# Patient Record
Sex: FEMALE | Race: WHITE | NOT HISPANIC OR LATINO | Employment: OTHER | URBAN - METROPOLITAN AREA
[De-identification: names, ages, dates, MRNs, and addresses within clinical notes are randomized per-mention and may not be internally consistent; named-entity substitution may affect disease eponyms.]

---

## 2023-04-26 RX ORDER — HYDROCHLOROTHIAZIDE 25 MG/1
TABLET ORAL
Qty: 90 TABLET | Refills: 3 | OUTPATIENT
Start: 2023-04-26

## 2023-05-19 DIAGNOSIS — I10 PRIMARY HYPERTENSION: Primary | ICD-10-CM

## 2023-05-19 RX ORDER — METOPROLOL TARTRATE 50 MG/1
TABLET ORAL
Qty: 180 TABLET | Refills: 3 | Status: SHIPPED | OUTPATIENT
Start: 2023-05-19 | End: 2023-09-01 | Stop reason: SDUPTHER

## 2023-06-12 DIAGNOSIS — L30.9 DERMATITIS: Primary | ICD-10-CM

## 2023-06-12 RX ORDER — POTASSIUM CHLORIDE 1500 MG/1
TABLET, EXTENDED RELEASE ORAL
Qty: 90 TABLET | Refills: 3 | Status: SHIPPED | OUTPATIENT
Start: 2023-06-12 | End: 2023-09-01 | Stop reason: SDUPTHER

## 2023-08-17 DIAGNOSIS — E03.9 HYPOTHYROIDISM, UNSPECIFIED TYPE: Primary | ICD-10-CM

## 2023-08-17 RX ORDER — LEVOTHYROXINE SODIUM 125 UG/1
125 TABLET ORAL DAILY
Qty: 90 TABLET | Refills: 3 | Status: SHIPPED | OUTPATIENT
Start: 2023-08-17 | End: 2023-09-01 | Stop reason: SDUPTHER

## 2023-08-28 LAB
ALANINE AMINOTRANSFERASE (SGPT) (U/L) IN SER/PLAS: 18 U/L (ref 7–45)
ALBUMIN (G/DL) IN SER/PLAS: 4.5 G/DL (ref 3.4–5)
ALKALINE PHOSPHATASE (U/L) IN SER/PLAS: 79 U/L (ref 33–136)
ANION GAP IN SER/PLAS: 12 MMOL/L (ref 10–20)
APPEARANCE, URINE: ABNORMAL
ASPARTATE AMINOTRANSFERASE (SGOT) (U/L) IN SER/PLAS: 17 U/L (ref 9–39)
BASOPHILS (10*3/UL) IN BLOOD BY AUTOMATED COUNT: 0.05 X10E9/L (ref 0–0.1)
BASOPHILS/100 LEUKOCYTES IN BLOOD BY AUTOMATED COUNT: 0.7 % (ref 0–2)
BILIRUBIN TOTAL (MG/DL) IN SER/PLAS: 0.6 MG/DL (ref 0–1.2)
BILIRUBIN, URINE: NEGATIVE
BLOOD, URINE: NEGATIVE
CALCIUM (MG/DL) IN SER/PLAS: 9.8 MG/DL (ref 8.6–10.6)
CARBON DIOXIDE, TOTAL (MMOL/L) IN SER/PLAS: 30 MMOL/L (ref 21–32)
CHLORIDE (MMOL/L) IN SER/PLAS: 102 MMOL/L (ref 98–107)
COLOR, URINE: YELLOW
CREATININE (MG/DL) IN SER/PLAS: 0.63 MG/DL (ref 0.5–1.05)
EOSINOPHILS (10*3/UL) IN BLOOD BY AUTOMATED COUNT: 0.09 X10E9/L (ref 0–0.7)
EOSINOPHILS/100 LEUKOCYTES IN BLOOD BY AUTOMATED COUNT: 1.3 % (ref 0–6)
ERYTHROCYTE DISTRIBUTION WIDTH (RATIO) BY AUTOMATED COUNT: 12.4 % (ref 11.5–14.5)
ERYTHROCYTE MEAN CORPUSCULAR HEMOGLOBIN CONCENTRATION (G/DL) BY AUTOMATED: 32.7 G/DL (ref 32–36)
ERYTHROCYTE MEAN CORPUSCULAR VOLUME (FL) BY AUTOMATED COUNT: 91 FL (ref 80–100)
ERYTHROCYTES (10*6/UL) IN BLOOD BY AUTOMATED COUNT: 5.01 X10E12/L (ref 4–5.2)
GFR FEMALE: >90 ML/MIN/1.73M2
GLUCOSE (MG/DL) IN SER/PLAS: 136 MG/DL (ref 74–99)
GLUCOSE, URINE: NEGATIVE MG/DL
HEMATOCRIT (%) IN BLOOD BY AUTOMATED COUNT: 45.6 % (ref 36–46)
HEMOGLOBIN (G/DL) IN BLOOD: 14.9 G/DL (ref 12–16)
IMMATURE GRANULOCYTES/100 LEUKOCYTES IN BLOOD BY AUTOMATED COUNT: 0.1 % (ref 0–0.9)
KETONES, URINE: NEGATIVE MG/DL
LEUKOCYTE ESTERASE, URINE: ABNORMAL
LEUKOCYTES (10*3/UL) IN BLOOD BY AUTOMATED COUNT: 7 X10E9/L (ref 4.4–11.3)
LYMPHOCYTES (10*3/UL) IN BLOOD BY AUTOMATED COUNT: 2.24 X10E9/L (ref 1.2–4.8)
LYMPHOCYTES/100 LEUKOCYTES IN BLOOD BY AUTOMATED COUNT: 32.2 % (ref 13–44)
MONOCYTES (10*3/UL) IN BLOOD BY AUTOMATED COUNT: 0.8 X10E9/L (ref 0.1–1)
MONOCYTES/100 LEUKOCYTES IN BLOOD BY AUTOMATED COUNT: 11.5 % (ref 2–10)
MUCUS, URINE: NORMAL /LPF
NEUTROPHILS (10*3/UL) IN BLOOD BY AUTOMATED COUNT: 3.77 X10E9/L (ref 1.2–7.7)
NEUTROPHILS/100 LEUKOCYTES IN BLOOD BY AUTOMATED COUNT: 54.2 % (ref 40–80)
NITRITE, URINE: NEGATIVE
NRBC (PER 100 WBCS) BY AUTOMATED COUNT: 0 /100 WBC (ref 0–0)
PH, URINE: 5 (ref 5–8)
PLATELETS (10*3/UL) IN BLOOD AUTOMATED COUNT: 273 X10E9/L (ref 150–450)
POTASSIUM (MMOL/L) IN SER/PLAS: 4.4 MMOL/L (ref 3.5–5.3)
PROTEIN TOTAL: 7 G/DL (ref 6.4–8.2)
PROTEIN, URINE: NEGATIVE MG/DL
RBC, URINE: NORMAL /HPF (ref 0–5)
SODIUM (MMOL/L) IN SER/PLAS: 140 MMOL/L (ref 136–145)
SPECIFIC GRAVITY, URINE: 1.02 (ref 1–1.03)
SQUAMOUS EPITHELIAL CELLS, URINE: 2 /HPF
THYROTROPIN (MIU/L) IN SER/PLAS BY DETECTION LIMIT <= 0.05 MIU/L: 2.06 MIU/L (ref 0.44–3.98)
UREA NITROGEN (MG/DL) IN SER/PLAS: 26 MG/DL (ref 6–23)
URIC ACID (URATE) CRYSTALS, URINE: NORMAL /HPF
UROBILINOGEN, URINE: <2 MG/DL (ref 0–1.9)
WBC, URINE: 1 /HPF (ref 0–5)

## 2023-09-01 ENCOUNTER — OFFICE VISIT (OUTPATIENT)
Dept: PRIMARY CARE | Facility: CLINIC | Age: 63
End: 2023-09-01
Payer: COMMERCIAL

## 2023-09-01 ENCOUNTER — LAB (OUTPATIENT)
Dept: LAB | Facility: LAB | Age: 63
End: 2023-09-01
Payer: COMMERCIAL

## 2023-09-01 VITALS
WEIGHT: 238 LBS | BODY MASS INDEX: 38.25 KG/M2 | HEIGHT: 66 IN | DIASTOLIC BLOOD PRESSURE: 80 MMHG | SYSTOLIC BLOOD PRESSURE: 126 MMHG

## 2023-09-01 DIAGNOSIS — E03.9 HYPOTHYROIDISM, UNSPECIFIED TYPE: ICD-10-CM

## 2023-09-01 DIAGNOSIS — L30.9 DERMATITIS: ICD-10-CM

## 2023-09-01 DIAGNOSIS — Z79.899 MEDICATION MANAGEMENT: ICD-10-CM

## 2023-09-01 DIAGNOSIS — E13.69 OTHER SPECIFIED DIABETES MELLITUS WITH OTHER SPECIFIED COMPLICATION, UNSPECIFIED WHETHER LONG TERM INSULIN USE (MULTI): ICD-10-CM

## 2023-09-01 DIAGNOSIS — I10 HYPERTENSION, UNSPECIFIED TYPE: ICD-10-CM

## 2023-09-01 DIAGNOSIS — F41.9 ANXIETY: ICD-10-CM

## 2023-09-01 DIAGNOSIS — I10 PRIMARY HYPERTENSION: ICD-10-CM

## 2023-09-01 LAB
AMPHETAMINE (PRESENCE) IN URINE BY SCREEN METHOD: NORMAL
BARBITURATES PRESENCE IN URINE BY SCREEN METHOD: NORMAL
BENZODIAZEPINE (PRESENCE) IN URINE BY SCREEN METHOD: NORMAL
CANNABINOIDS IN URINE BY SCREEN METHOD: NORMAL
COCAINE (PRESENCE) IN URINE BY SCREEN METHOD: NORMAL
DRUG SCREEN COMMENT URINE: NORMAL
FENTANYL URINE: NORMAL
OPIATES (PRESENCE) IN URINE BY SCREEN METHOD: NORMAL
OXYCODONE (PRESENCE) IN URINE BY SCREEN METHOD: NORMAL
PHENCYCLIDINE (PRESENCE) IN URINE BY SCREEN METHOD: NORMAL

## 2023-09-01 PROCEDURE — 80307 DRUG TEST PRSMV CHEM ANLYZR: CPT

## 2023-09-01 PROCEDURE — 3044F HG A1C LEVEL LT 7.0%: CPT | Performed by: INTERNAL MEDICINE

## 2023-09-01 PROCEDURE — 3074F SYST BP LT 130 MM HG: CPT | Performed by: INTERNAL MEDICINE

## 2023-09-01 PROCEDURE — 3079F DIAST BP 80-89 MM HG: CPT | Performed by: INTERNAL MEDICINE

## 2023-09-01 PROCEDURE — 99213 OFFICE O/P EST LOW 20 MIN: CPT | Performed by: INTERNAL MEDICINE

## 2023-09-01 RX ORDER — HYDROCHLOROTHIAZIDE 25 MG/1
25 TABLET ORAL DAILY
Qty: 90 TABLET | Refills: 1 | Status: SHIPPED | OUTPATIENT
Start: 2023-09-01 | End: 2024-01-26 | Stop reason: SDUPTHER

## 2023-09-01 RX ORDER — BUPROPION HYDROCHLORIDE 300 MG/1
300 TABLET ORAL EVERY MORNING
Qty: 30 TABLET | Refills: 0 | Status: SHIPPED | OUTPATIENT
Start: 2023-09-01 | End: 2023-09-18 | Stop reason: SDUPTHER

## 2023-09-01 RX ORDER — ALPRAZOLAM 0.5 MG/1
0.5 TABLET ORAL DAILY PRN
Qty: 20 TABLET | Refills: 0 | Status: SHIPPED | OUTPATIENT
Start: 2023-09-01 | End: 2024-01-26 | Stop reason: SDUPTHER

## 2023-09-01 RX ORDER — LEVOTHYROXINE SODIUM 125 UG/1
125 TABLET ORAL DAILY
Qty: 90 TABLET | Refills: 1 | Status: SHIPPED | OUTPATIENT
Start: 2023-09-01 | End: 2024-01-26 | Stop reason: SDUPTHER

## 2023-09-01 RX ORDER — METOPROLOL TARTRATE 50 MG/1
TABLET ORAL
Qty: 180 TABLET | Refills: 1 | Status: SHIPPED | OUTPATIENT
Start: 2023-09-01 | End: 2024-02-13

## 2023-09-01 RX ORDER — POTASSIUM CHLORIDE 20 MEQ/1
TABLET, EXTENDED RELEASE ORAL
Qty: 90 TABLET | Refills: 1 | Status: SHIPPED | OUTPATIENT
Start: 2023-09-01 | End: 2024-01-26 | Stop reason: SDUPTHER

## 2023-09-01 ASSESSMENT — ENCOUNTER SYMPTOMS
DEPRESSION: 0
OCCASIONAL FEELINGS OF UNSTEADINESS: 0
LOSS OF SENSATION IN FEET: 0

## 2023-09-01 NOTE — PROGRESS NOTES
OFFICE NOTE    NAME OF THE PATIENT: Alexis Yap    YOB: 1960    CHIEF COMPLAINT:  Alexis Yap today came here for multiple medical issues.  She tried Rybelsus.  She has too many side effects.  She stopped it.  Blood sugars okay.  She is lost about eight to nine pounds by intermittent fasting.  She is trying to keep sugar under control.  She cannot take metformin.  She came here for follow-up on various conditions.    PAST MEDICAL HISTORY:  Reviewed on EMR, unchanged.    CURRENT MEDICATIONS:  Reviewed on EMR, unchanged.  Hydrochlorothiazide. Synthroid, metoprolol, and potassium.    ALLERGIES:  Reviewed on EMR, unchanged.    SOCIAL HISTORY:  Reviewed on EMR, unchanged.  She does not smoke and does not drink alcohol.    FAMILY HISTORY:  Reviewed on EMR, unchanged.    REVIEW OF SYSTEMS:  All 12 systems reviewed and pertaining covered in history and physical.    PHYSICAL EXAMINATION  VITAL SIGNS:  As recorded and reviewed from EMR.  RESPIRATORY:  The patient had normal inspirations and expirations.  The breath sounds were equal bilaterally and clear to auscultation.  CARDIOVASCULAR:  The patient had S1 normal, split S2 without obvious rubs, clicks, or murmurs.    GASTROINTESTINAL:  There was no hepatosplenomegaly.  There were no palpable masses and no inguinal nodes.  EXTREMITIES:  Legs had no edema.  NEUROLOGIC:  The patient had normal cranial nerves.  The reflexes, sensory, and motor examination were grossly within normal limits.    LAB WORK:  Laboratory testing discussed.    ASSESSMENT AND PLAN:  Type 2 diabetes, diet-controlled.  I will recheck it.  Anxiety and stress.  I urged her to take Wellbutrin.  Refer to counseling.  Her  has a psychiatric issue.  She needs Xanax or coping technique especially when she goes to flight, she has flight anxiety.  She needs Xanax.  20 tablets given last time in October. Her drug report is okay.  Hypertension, good.  Hypothyroid.  We will  "monitor.  Follow-up appointment in three months.  Do the blood work.      Kindly review this note in conjunction with EMR.   Subjective   Patient ID: Alexis Yap is a 63 y.o. female who presents for Follow-up.      HPI    Review of Systems    Objective   /80   Ht 1.676 m (5' 6\")   Wt 108 kg (238 lb)   BMI 38.41 kg/m²       Physical Exam    Assessment/Plan   Problem List Items Addressed This Visit    None        "

## 2023-09-18 DIAGNOSIS — F41.9 ANXIETY: ICD-10-CM

## 2023-09-18 RX ORDER — BUPROPION HYDROCHLORIDE 300 MG/1
300 TABLET ORAL EVERY MORNING
Qty: 30 TABLET | Refills: 1 | Status: SHIPPED | OUTPATIENT
Start: 2023-09-18 | End: 2023-10-12 | Stop reason: SDUPTHER

## 2023-10-06 DIAGNOSIS — M65.341 TRIGGER RING FINGER OF RIGHT HAND: ICD-10-CM

## 2023-10-09 PROBLEM — M65.341 TRIGGER RING FINGER OF RIGHT HAND: Status: ACTIVE | Noted: 2023-10-06

## 2023-10-11 PROBLEM — E55.9 VITAMIN D DEFICIENCY: Status: ACTIVE | Noted: 2023-10-11

## 2023-10-11 PROBLEM — N89.8 PRURITUS OF VAGINA: Status: ACTIVE | Noted: 2023-10-11

## 2023-10-11 PROBLEM — Z96.649 HISTORY OF TOTAL HIP REPLACEMENT: Status: ACTIVE | Noted: 2023-10-11

## 2023-10-11 PROBLEM — K64.8 INTERNAL HEMORRHOIDS: Status: ACTIVE | Noted: 2023-10-11

## 2023-10-11 PROBLEM — R14.0 ABDOMINAL BLOATING: Status: ACTIVE | Noted: 2023-10-11

## 2023-10-11 PROBLEM — L91.8 OTHER HYPERTROPHIC DISORDERS OF THE SKIN: Status: ACTIVE | Noted: 2023-06-01

## 2023-10-11 PROBLEM — N32.81 OAB (OVERACTIVE BLADDER): Status: ACTIVE | Noted: 2023-10-11

## 2023-10-11 PROBLEM — E66.01 MORBID OBESITY (MULTI): Status: ACTIVE | Noted: 2023-10-11

## 2023-10-11 PROBLEM — L57.9 SKIN CHANGES DUE TO CHRONIC EXPOSURE TO NONIONIZING RADIATION, UNSPECIFIED: Status: ACTIVE | Noted: 2023-06-01

## 2023-10-11 PROBLEM — M16.0 PRIMARY OSTEOARTHRITIS OF BOTH HIPS: Status: ACTIVE | Noted: 2023-10-11

## 2023-10-11 PROBLEM — R10.30 GROIN PAIN: Status: ACTIVE | Noted: 2023-10-11

## 2023-10-11 PROBLEM — M22.2X1 PATELLOFEMORAL SYNDROME, RIGHT: Status: ACTIVE | Noted: 2023-10-11

## 2023-10-11 PROBLEM — G47.00 INSOMNIA: Status: ACTIVE | Noted: 2023-10-11

## 2023-10-11 PROBLEM — M25.569 KNEE PAIN: Status: ACTIVE | Noted: 2023-10-11

## 2023-10-11 PROBLEM — D22.61 MELANOCYTIC NEVI OF RIGHT UPPER LIMB, INCLUDING SHOULDER: Status: ACTIVE | Noted: 2023-06-01

## 2023-10-11 PROBLEM — R91.8 LUNG NODULES: Status: ACTIVE | Noted: 2023-10-11

## 2023-10-11 PROBLEM — R03.0 BORDERLINE HYPERTENSION: Status: ACTIVE | Noted: 2023-10-11

## 2023-10-11 PROBLEM — M17.9 KNEE OSTEOARTHRITIS: Status: ACTIVE | Noted: 2023-10-11

## 2023-10-11 PROBLEM — N95.1 MENOPAUSAL SYMPTOMS: Status: ACTIVE | Noted: 2023-10-11

## 2023-10-11 PROBLEM — M25.559 HIP PAIN: Status: ACTIVE | Noted: 2023-10-11

## 2023-10-11 PROBLEM — S83.209A TEAR OF MENISCUS, CURRENT: Status: ACTIVE | Noted: 2023-10-11

## 2023-10-11 PROBLEM — H52.03 HYPEROPIA OF BOTH EYES: Status: ACTIVE | Noted: 2023-10-11

## 2023-10-11 PROBLEM — D22.62 MELANOCYTIC NEVI OF LEFT UPPER LIMB, INCLUDING SHOULDER: Status: ACTIVE | Noted: 2023-06-01

## 2023-10-11 PROBLEM — F41.9 ANXIETY: Status: ACTIVE | Noted: 2023-10-11

## 2023-10-11 PROBLEM — M17.0 ARTHRITIS OF BOTH KNEES: Status: ACTIVE | Noted: 2023-10-11

## 2023-10-11 PROBLEM — Z98.890 HISTORY OF ARTHROPLASTY OF LEFT HIP: Status: ACTIVE | Noted: 2023-10-11

## 2023-10-11 PROBLEM — H25.13 AGE-RELATED NUCLEAR CATARACT OF BOTH EYES: Status: ACTIVE | Noted: 2023-10-11

## 2023-10-11 PROBLEM — R12 HEARTBURN: Status: ACTIVE | Noted: 2023-10-11

## 2023-10-11 PROBLEM — L91.8 MULTIPLE ACQUIRED SKIN TAGS: Status: ACTIVE | Noted: 2023-10-11

## 2023-10-11 PROBLEM — M16.10 HIP ARTHRITIS: Status: ACTIVE | Noted: 2023-10-11

## 2023-10-11 PROBLEM — R39.9 LOWER URINARY TRACT SYMPTOMS (LUTS): Status: ACTIVE | Noted: 2023-10-11

## 2023-10-11 PROBLEM — E05.90 HYPERTHYROIDISM: Status: ACTIVE | Noted: 2023-10-11

## 2023-10-11 PROBLEM — R35.0 URINE FREQUENCY: Status: ACTIVE | Noted: 2023-10-11

## 2023-10-11 PROBLEM — K29.60 BILE REFLUX GASTRITIS: Status: ACTIVE | Noted: 2023-10-11

## 2023-10-11 PROBLEM — R53.1 FEELING WEAK: Status: ACTIVE | Noted: 2023-10-11

## 2023-10-11 PROBLEM — R63.4 WEIGHT LOSS: Status: ACTIVE | Noted: 2023-10-11

## 2023-10-11 PROBLEM — R73.03 PRE-DIABETES: Status: ACTIVE | Noted: 2023-10-11

## 2023-10-11 PROBLEM — K62.5 RECTAL BLEEDING: Status: ACTIVE | Noted: 2023-10-11

## 2023-10-11 PROBLEM — R94.31 ABNORMAL EKG: Status: ACTIVE | Noted: 2023-10-11

## 2023-10-11 PROBLEM — H52.4 BILATERAL PRESBYOPIA: Status: ACTIVE | Noted: 2023-10-11

## 2023-10-11 PROBLEM — D48.5 NEOPLASM OF UNCERTAIN BEHAVIOR OF SKIN: Status: ACTIVE | Noted: 2023-06-01

## 2023-10-11 PROBLEM — K21.9 ESOPHAGEAL REFLUX: Status: ACTIVE | Noted: 2023-10-11

## 2023-10-11 PROBLEM — R42 DIZZINESS: Status: ACTIVE | Noted: 2023-10-11

## 2023-10-11 PROBLEM — D22.5 MELANOCYTIC NEVI OF TRUNK: Status: ACTIVE | Noted: 2023-06-01

## 2023-10-11 PROBLEM — R10.13 ABDOMINAL PAIN, EPIGASTRIC: Status: ACTIVE | Noted: 2023-10-11

## 2023-10-11 PROBLEM — E53.9 VITAMIN B DEFICIENCY: Status: ACTIVE | Noted: 2023-10-11

## 2023-10-11 PROBLEM — E78.00 HYPERCHOLESTEREMIA: Status: ACTIVE | Noted: 2023-10-11

## 2023-10-11 PROBLEM — R07.89 ATYPICAL CHEST PAIN: Status: ACTIVE | Noted: 2023-10-11

## 2023-10-11 PROBLEM — M67.51 PLICA SYNDROME, RIGHT KNEE: Status: ACTIVE | Noted: 2023-10-11

## 2023-10-11 PROBLEM — R60.9 EDEMA: Status: ACTIVE | Noted: 2023-10-11

## 2023-10-11 PROBLEM — K58.0 IRRITABLE BOWEL SYNDROME WITH DIARRHEA: Status: ACTIVE | Noted: 2023-10-11

## 2023-10-11 PROBLEM — A04.8 HELICOBACTER PYLORI (H. PYLORI) INFECTION: Status: ACTIVE | Noted: 2023-10-11

## 2023-10-11 RX ORDER — SOLIFENACIN SUCCINATE 10 MG/1
10 TABLET, FILM COATED ORAL DAILY
COMMUNITY
End: 2023-11-22 | Stop reason: ALTCHOICE

## 2023-10-11 RX ORDER — FLUCONAZOLE 150 MG/1
TABLET ORAL
COMMUNITY
Start: 2023-01-03 | End: 2023-11-22 | Stop reason: ALTCHOICE

## 2023-10-11 RX ORDER — METHOCARBAMOL 750 MG/1
750 TABLET, FILM COATED ORAL 3 TIMES DAILY
COMMUNITY
End: 2023-11-22 | Stop reason: ALTCHOICE

## 2023-10-11 RX ORDER — METFORMIN HYDROCHLORIDE 500 MG/1
1 TABLET ORAL 2 TIMES DAILY
COMMUNITY
Start: 2019-08-29 | End: 2023-11-22 | Stop reason: ALTCHOICE

## 2023-10-11 RX ORDER — ASPIRIN 81 MG/1
1 TABLET ORAL DAILY
COMMUNITY
Start: 2017-03-27 | End: 2024-02-28 | Stop reason: HOSPADM

## 2023-10-11 RX ORDER — ASPIRIN 325 MG
1 TABLET ORAL DAILY
COMMUNITY
Start: 2020-09-24 | End: 2024-02-15 | Stop reason: ALTCHOICE

## 2023-10-11 RX ORDER — VITAMIN B COMPLEX
1 CAPSULE ORAL DAILY
COMMUNITY
End: 2023-11-22 | Stop reason: ALTCHOICE

## 2023-10-11 RX ORDER — FLUTICASONE PROPIONATE 50 MCG
SPRAY, SUSPENSION (ML) NASAL
COMMUNITY
Start: 2022-12-06 | End: 2023-11-22 | Stop reason: ALTCHOICE

## 2023-10-11 RX ORDER — FUROSEMIDE 40 MG/1
1 TABLET ORAL DAILY
COMMUNITY
Start: 2023-03-03 | End: 2023-11-22 | Stop reason: ALTCHOICE

## 2023-10-11 RX ORDER — TRAMADOL HYDROCHLORIDE 50 MG/1
50 TABLET ORAL EVERY 6 HOURS PRN
COMMUNITY
Start: 2020-08-18 | End: 2023-10-20 | Stop reason: HOSPADM

## 2023-10-11 RX ORDER — ACETAMINOPHEN 500 MG
1000 TABLET ORAL 3 TIMES DAILY
COMMUNITY
End: 2024-02-15 | Stop reason: ALTCHOICE

## 2023-10-11 RX ORDER — ROSUVASTATIN CALCIUM 10 MG/1
1 TABLET, COATED ORAL EVERY OTHER DAY
COMMUNITY
Start: 2017-03-10 | End: 2024-04-12

## 2023-10-11 RX ORDER — IBUPROFEN 800 MG/1
800 TABLET ORAL 3 TIMES DAILY PRN
COMMUNITY
Start: 2022-10-28 | End: 2024-02-28 | Stop reason: HOSPADM

## 2023-10-11 RX ORDER — ACETAMINOPHEN 325 MG/1
2 TABLET ORAL EVERY 8 HOURS PRN
COMMUNITY
Start: 2020-08-18 | End: 2024-02-28 | Stop reason: HOSPADM

## 2023-10-11 RX ORDER — PREDNISONE 50 MG/1
TABLET ORAL
COMMUNITY
Start: 2022-12-06 | End: 2023-11-22 | Stop reason: ALTCHOICE

## 2023-10-11 RX ORDER — ALBUTEROL SULFATE 90 UG/1
AEROSOL, METERED RESPIRATORY (INHALATION)
COMMUNITY
Start: 2022-12-06 | End: 2023-10-12 | Stop reason: SDUPTHER

## 2023-10-11 RX ORDER — ORAL SEMAGLUTIDE 7 MG/1
1 TABLET ORAL DAILY
COMMUNITY
Start: 2023-02-03 | End: 2023-11-22 | Stop reason: ALTCHOICE

## 2023-10-11 RX ORDER — AMOXICILLIN 500 MG/1
2000 CAPSULE ORAL
COMMUNITY
Start: 2022-08-05 | End: 2023-11-22 | Stop reason: ALTCHOICE

## 2023-10-11 RX ORDER — MIRABEGRON 50 MG/1
1 TABLET, FILM COATED, EXTENDED RELEASE ORAL DAILY
COMMUNITY
Start: 2022-05-14 | End: 2023-11-22 | Stop reason: ALTCHOICE

## 2023-10-11 RX ORDER — CHOLECALCIFEROL (VITAMIN D3) 125 MCG
125 CAPSULE ORAL DAILY
COMMUNITY

## 2023-10-12 ENCOUNTER — OFFICE VISIT (OUTPATIENT)
Dept: PRIMARY CARE | Facility: CLINIC | Age: 63
End: 2023-10-12
Payer: COMMERCIAL

## 2023-10-12 VITALS
SYSTOLIC BLOOD PRESSURE: 140 MMHG | HEIGHT: 66 IN | BODY MASS INDEX: 39.21 KG/M2 | DIASTOLIC BLOOD PRESSURE: 82 MMHG | WEIGHT: 244 LBS

## 2023-10-12 DIAGNOSIS — F41.9 ANXIETY: ICD-10-CM

## 2023-10-12 DIAGNOSIS — J45.909 ACUTE ASTHMATIC BRONCHITIS (HHS-HCC): Primary | ICD-10-CM

## 2023-10-12 DIAGNOSIS — R05.9 COUGH, UNSPECIFIED TYPE: ICD-10-CM

## 2023-10-12 DIAGNOSIS — E78.00 HYPERCHOLESTEREMIA: ICD-10-CM

## 2023-10-12 DIAGNOSIS — E03.9 HYPOTHYROIDISM, UNSPECIFIED TYPE: ICD-10-CM

## 2023-10-12 DIAGNOSIS — R06.2 WHEEZING: ICD-10-CM

## 2023-10-12 DIAGNOSIS — F43.9 STRESS: ICD-10-CM

## 2023-10-12 DIAGNOSIS — E13.69 OTHER SPECIFIED DIABETES MELLITUS WITH OTHER SPECIFIED COMPLICATION, UNSPECIFIED WHETHER LONG TERM INSULIN USE (MULTI): ICD-10-CM

## 2023-10-12 DIAGNOSIS — I10 HYPERTENSION, UNSPECIFIED TYPE: ICD-10-CM

## 2023-10-12 PROCEDURE — 99214 OFFICE O/P EST MOD 30 MIN: CPT | Performed by: INTERNAL MEDICINE

## 2023-10-12 PROCEDURE — 3079F DIAST BP 80-89 MM HG: CPT | Performed by: INTERNAL MEDICINE

## 2023-10-12 PROCEDURE — 1036F TOBACCO NON-USER: CPT | Performed by: INTERNAL MEDICINE

## 2023-10-12 PROCEDURE — 3044F HG A1C LEVEL LT 7.0%: CPT | Performed by: INTERNAL MEDICINE

## 2023-10-12 PROCEDURE — 3077F SYST BP >= 140 MM HG: CPT | Performed by: INTERNAL MEDICINE

## 2023-10-12 RX ORDER — BUDESONIDE AND FORMOTEROL FUMARATE DIHYDRATE 80; 4.5 UG/1; UG/1
2 AEROSOL RESPIRATORY (INHALATION)
Qty: 6.9 G | Refills: 1 | Status: SHIPPED | OUTPATIENT
Start: 2023-10-12 | End: 2023-11-22 | Stop reason: ALTCHOICE

## 2023-10-12 RX ORDER — BUPROPION HYDROCHLORIDE 300 MG/1
300 TABLET ORAL EVERY MORNING
Qty: 30 TABLET | Refills: 1 | Status: SHIPPED | OUTPATIENT
Start: 2023-10-12 | End: 2024-01-03 | Stop reason: SDUPTHER

## 2023-10-12 RX ORDER — DEXTROMETHORPHAN HYDROBROMIDE, GUAIFENESIN 20; 400 MG/20ML; MG/20ML
5 SOLUTION ORAL 2 TIMES DAILY
Qty: 118 ML | Refills: 0 | Status: SHIPPED | OUTPATIENT
Start: 2023-10-12 | End: 2023-11-22 | Stop reason: ALTCHOICE

## 2023-10-12 RX ORDER — ALBUTEROL SULFATE 90 UG/1
2 AEROSOL, METERED RESPIRATORY (INHALATION)
Qty: 18 G | Refills: 1 | Status: SHIPPED | OUTPATIENT
Start: 2023-10-12 | End: 2023-11-22 | Stop reason: ALTCHOICE

## 2023-10-12 RX ORDER — LEVOFLOXACIN 250 MG/1
250 TABLET ORAL DAILY
Qty: 10 TABLET | Refills: 0 | Status: SHIPPED | OUTPATIENT
Start: 2023-10-12 | End: 2023-10-22

## 2023-10-12 ASSESSMENT — ENCOUNTER SYMPTOMS
OCCASIONAL FEELINGS OF UNSTEADINESS: 0
DEPRESSION: 0
LOSS OF SENSATION IN FEET: 0

## 2023-10-13 ENCOUNTER — APPOINTMENT (OUTPATIENT)
Dept: DERMATOLOGY | Facility: CLINIC | Age: 63
End: 2023-10-13
Payer: COMMERCIAL

## 2023-10-13 NOTE — PROGRESS NOTES
"Subjective   Patient ID: Alexis Yap is a 63 y.o. female who presents for Follow-up (Multiple medical issues).    This young lady today come here for cough, yellow sputum, sinus congestion, bronchitis, headache going on for the last several days.  Over-the-counter medications not helping.    She like the way she feels on Wellbutrin, things are better.  She wants to continue.  She wants to keep same dose.    I have personally reviewed the patient's Past Medical History, Medications, Allergies, Social History, and Family History in the EMR.    Review of Systems   All other systems reviewed and are negative.    Objective   /82   Ht 1.676 m (5' 6\")   Wt 111 kg (244 lb)   BMI 39.38 kg/m²     Physical Exam  Vitals reviewed.   HENT:      Nose: Congestion present.      Comments: Postnasal drip.     Mouth/Throat:      Comments: Throat congested.  Cardiovascular:      Heart sounds: Normal heart sounds, S1 normal and S2 normal. No murmur heard.     No friction rub.   Pulmonary:      Effort: Pulmonary effort is normal.      Breath sounds: Wheezing present.   Abdominal:      Palpations: There is no hepatomegaly, splenomegaly or mass.   Musculoskeletal:      Right lower leg: No edema.      Left lower leg: No edema.   Lymphadenopathy:      Lower Body: No right inguinal adenopathy. No left inguinal adenopathy.   Neurological:      Cranial Nerves: Cranial nerves 2-12 are intact.      Sensory: No sensory deficit.      Motor: Motor function is intact.      Deep Tendon Reflexes: Reflexes are normal and symmetric.     LAB WORK: Laboratory testing discussed.    Assessment/Plan   Problem List Items Addressed This Visit             ICD-10-CM       Cardiac and Vasculature    Hypercholesteremia E78.00    Relevant Orders    Comprehensive metabolic panel    Lipid panel       Mental Health    Anxiety F41.9    Relevant Medications    buPROPion XL (Wellbutrin XL) 300 mg 24 hr tablet     Other Visit Diagnoses         Codes    Acute " asthmatic bronchitis    -  Primary J45.909    Hypertension, unspecified type     I10    Relevant Orders    CBC    Other specified diabetes mellitus with other specified complication, unspecified whether long term insulin use (CMS/[facility])     E13.69    Relevant Orders    Hemoglobin A1c    Hypothyroidism, unspecified type     E03.9    Relevant Orders    TSH    Wheezing     R06.2    Relevant Medications    levoFLOXacin (Levaquin) 250 mg tablet    budesonide-formoteroL (Symbicort) 80-4.5 mcg/actuation inhaler    albuterol 90 mcg/actuation inhaler    Cough, unspecified type     R05.9    Relevant Medications    levoFLOXacin (Levaquin) 250 mg tablet    budesonide-formoteroL (Symbicort) 80-4.5 mcg/actuation inhaler    albuterol 90 mcg/actuation inhaler    dextromethorphan-guaifenesin (Robitussin Cough-Chest Richard DM) 5-100 mg/5 mL liquid    Stress     F43.9        1. Acute asthmatic bronchitis.  Levaquin, Claritin, Robitussin, Flonase, Symbicort, albuterol.  Monitor.  2. Anxiety, stress.  Continue Wellbutrin same dose.  3. Follow-up in three months.  Happy to see anytime sooner if necessary.    Scribe Attestation  By signing my name below, IRosie Scribe attest that this documentation has been prepared under the direction and in the presence of Elier Kruger MD.

## 2023-10-17 RX ORDER — GLUCOSAMINE/CHONDRO SU A 500-400 MG
2 TABLET ORAL DAILY
COMMUNITY
End: 2024-02-28 | Stop reason: HOSPADM

## 2023-10-17 RX ORDER — CALCIUM CARBONATE 300MG(750)
400 TABLET,CHEWABLE ORAL DAILY
COMMUNITY

## 2023-10-19 ENCOUNTER — ANESTHESIA EVENT (OUTPATIENT)
Dept: OPERATING ROOM | Facility: CLINIC | Age: 63
End: 2023-10-19
Payer: COMMERCIAL

## 2023-10-20 ENCOUNTER — ANESTHESIA (OUTPATIENT)
Dept: OPERATING ROOM | Facility: CLINIC | Age: 63
End: 2023-10-20
Payer: COMMERCIAL

## 2023-10-20 ENCOUNTER — HOSPITAL ENCOUNTER (OUTPATIENT)
Facility: CLINIC | Age: 63
Setting detail: OUTPATIENT SURGERY
Discharge: HOME | End: 2023-10-20
Attending: ORTHOPAEDIC SURGERY | Admitting: ORTHOPAEDIC SURGERY
Payer: COMMERCIAL

## 2023-10-20 VITALS
BODY MASS INDEX: 38.69 KG/M2 | SYSTOLIC BLOOD PRESSURE: 108 MMHG | HEIGHT: 66 IN | WEIGHT: 240.74 LBS | RESPIRATION RATE: 16 BRPM | TEMPERATURE: 97.2 F | DIASTOLIC BLOOD PRESSURE: 70 MMHG | HEART RATE: 69 BPM | OXYGEN SATURATION: 98 %

## 2023-10-20 DIAGNOSIS — M65.341 TRIGGER RING FINGER OF RIGHT HAND: Primary | ICD-10-CM

## 2023-10-20 PROBLEM — E05.90 HYPERTHYROIDISM: Status: RESOLVED | Noted: 2023-10-11 | Resolved: 2023-10-20

## 2023-10-20 PROBLEM — R07.89 ATYPICAL CHEST PAIN: Status: RESOLVED | Noted: 2023-10-11 | Resolved: 2023-10-20

## 2023-10-20 PROBLEM — I10 HTN (HYPERTENSION): Status: ACTIVE | Noted: 2023-10-20

## 2023-10-20 PROBLEM — E03.9 HYPOTHYROIDISM: Status: ACTIVE | Noted: 2023-10-20

## 2023-10-20 PROCEDURE — 2500000004 HC RX 250 GENERAL PHARMACY W/ HCPCS (ALT 636 FOR OP/ED): Performed by: NURSE ANESTHETIST, CERTIFIED REGISTERED

## 2023-10-20 PROCEDURE — 3600000003 HC OR TIME - INITIAL BASE CHARGE - PROCEDURE LEVEL THREE: Performed by: ORTHOPAEDIC SURGERY

## 2023-10-20 PROCEDURE — 3700000002 HC GENERAL ANESTHESIA TIME - EACH INCREMENTAL 1 MINUTE: Performed by: ORTHOPAEDIC SURGERY

## 2023-10-20 PROCEDURE — 7100000010 HC PHASE TWO TIME - EACH INCREMENTAL 1 MINUTE: Performed by: ORTHOPAEDIC SURGERY

## 2023-10-20 PROCEDURE — A26055 PR INCISE FINGER TENDON SHEATH: Performed by: NURSE ANESTHETIST, CERTIFIED REGISTERED

## 2023-10-20 PROCEDURE — A26055 PR INCISE FINGER TENDON SHEATH: Performed by: ANESTHESIOLOGY

## 2023-10-20 PROCEDURE — 2500000005 HC RX 250 GENERAL PHARMACY W/O HCPCS: Performed by: ORTHOPAEDIC SURGERY

## 2023-10-20 PROCEDURE — 2500000004 HC RX 250 GENERAL PHARMACY W/ HCPCS (ALT 636 FOR OP/ED): Performed by: ANESTHESIOLOGY

## 2023-10-20 PROCEDURE — 3600000008 HC OR TIME - EACH INCREMENTAL 1 MINUTE - PROCEDURE LEVEL THREE: Performed by: ORTHOPAEDIC SURGERY

## 2023-10-20 PROCEDURE — 26055 INCISE FINGER TENDON SHEATH: CPT | Performed by: ORTHOPAEDIC SURGERY

## 2023-10-20 PROCEDURE — 7100000009 HC PHASE TWO TIME - INITIAL BASE CHARGE: Performed by: ORTHOPAEDIC SURGERY

## 2023-10-20 PROCEDURE — 3700000001 HC GENERAL ANESTHESIA TIME - INITIAL BASE CHARGE: Performed by: ORTHOPAEDIC SURGERY

## 2023-10-20 PROCEDURE — 2500000005 HC RX 250 GENERAL PHARMACY W/O HCPCS: Performed by: NURSE ANESTHETIST, CERTIFIED REGISTERED

## 2023-10-20 PROCEDURE — 2500000004 HC RX 250 GENERAL PHARMACY W/ HCPCS (ALT 636 FOR OP/ED): Performed by: ORTHOPAEDIC SURGERY

## 2023-10-20 RX ORDER — LIDOCAINE HYDROCHLORIDE 10 MG/ML
INJECTION INFILTRATION; PERINEURAL AS NEEDED
Status: DISCONTINUED | OUTPATIENT
Start: 2023-10-20 | End: 2023-10-20 | Stop reason: HOSPADM

## 2023-10-20 RX ORDER — SODIUM CHLORIDE, SODIUM LACTATE, POTASSIUM CHLORIDE, CALCIUM CHLORIDE 600; 310; 30; 20 MG/100ML; MG/100ML; MG/100ML; MG/100ML
100 INJECTION, SOLUTION INTRAVENOUS CONTINUOUS
Status: DISCONTINUED | OUTPATIENT
Start: 2023-10-20 | End: 2023-10-20 | Stop reason: HOSPADM

## 2023-10-20 RX ORDER — FENTANYL CITRATE 50 UG/ML
INJECTION, SOLUTION INTRAMUSCULAR; INTRAVENOUS AS NEEDED
Status: DISCONTINUED | OUTPATIENT
Start: 2023-10-20 | End: 2023-10-20

## 2023-10-20 RX ORDER — ONDANSETRON HYDROCHLORIDE 2 MG/ML
4 INJECTION, SOLUTION INTRAVENOUS ONCE AS NEEDED
Status: DISCONTINUED | OUTPATIENT
Start: 2023-10-20 | End: 2023-10-20 | Stop reason: HOSPADM

## 2023-10-20 RX ORDER — PROPOFOL 10 MG/ML
INJECTION, EMULSION INTRAVENOUS AS NEEDED
Status: DISCONTINUED | OUTPATIENT
Start: 2023-10-20 | End: 2023-10-20

## 2023-10-20 RX ORDER — PROPOFOL 10 MG/ML
INJECTION, EMULSION INTRAVENOUS CONTINUOUS PRN
Status: DISCONTINUED | OUTPATIENT
Start: 2023-10-20 | End: 2023-10-20

## 2023-10-20 RX ORDER — HYDROMORPHONE HYDROCHLORIDE 1 MG/ML
0.5 INJECTION, SOLUTION INTRAMUSCULAR; INTRAVENOUS; SUBCUTANEOUS EVERY 5 MIN PRN
Status: DISCONTINUED | OUTPATIENT
Start: 2023-10-20 | End: 2023-10-20 | Stop reason: HOSPADM

## 2023-10-20 RX ORDER — CEFAZOLIN 1 G/1
INJECTION, POWDER, FOR SOLUTION INTRAVENOUS AS NEEDED
Status: DISCONTINUED | OUTPATIENT
Start: 2023-10-20 | End: 2023-10-20

## 2023-10-20 RX ORDER — MIDAZOLAM HYDROCHLORIDE 1 MG/ML
INJECTION, SOLUTION INTRAMUSCULAR; INTRAVENOUS AS NEEDED
Status: DISCONTINUED | OUTPATIENT
Start: 2023-10-20 | End: 2023-10-20

## 2023-10-20 RX ORDER — OXYCODONE HYDROCHLORIDE 5 MG/1
5 TABLET ORAL EVERY 4 HOURS PRN
Status: DISCONTINUED | OUTPATIENT
Start: 2023-10-20 | End: 2023-10-20 | Stop reason: HOSPADM

## 2023-10-20 RX ORDER — BUPIVACAINE HYDROCHLORIDE 5 MG/ML
INJECTION, SOLUTION EPIDURAL; INTRACAUDAL AS NEEDED
Status: DISCONTINUED | OUTPATIENT
Start: 2023-10-20 | End: 2023-10-20 | Stop reason: HOSPADM

## 2023-10-20 RX ORDER — CEFAZOLIN SODIUM 2 G/100ML
2 INJECTION, SOLUTION INTRAVENOUS EVERY 8 HOURS
Status: DISCONTINUED | OUTPATIENT
Start: 2023-10-20 | End: 2023-10-20 | Stop reason: HOSPADM

## 2023-10-20 RX ORDER — TRAMADOL HYDROCHLORIDE 50 MG/1
50 TABLET ORAL EVERY 6 HOURS PRN
Qty: 15 TABLET | Refills: 0 | Status: SHIPPED | OUTPATIENT
Start: 2023-10-20 | End: 2023-11-22 | Stop reason: ALTCHOICE

## 2023-10-20 RX ORDER — LIDOCAINE HYDROCHLORIDE 20 MG/ML
INJECTION, SOLUTION INFILTRATION; PERINEURAL AS NEEDED
Status: DISCONTINUED | OUTPATIENT
Start: 2023-10-20 | End: 2023-10-20

## 2023-10-20 RX ORDER — ONDANSETRON HYDROCHLORIDE 2 MG/ML
INJECTION, SOLUTION INTRAVENOUS AS NEEDED
Status: DISCONTINUED | OUTPATIENT
Start: 2023-10-20 | End: 2023-10-20

## 2023-10-20 RX ADMIN — MIDAZOLAM 2 MG: 1 INJECTION INTRAMUSCULAR; INTRAVENOUS at 08:38

## 2023-10-20 RX ADMIN — FENTANYL CITRATE 25 MCG: 50 INJECTION, SOLUTION INTRAMUSCULAR; INTRAVENOUS at 08:44

## 2023-10-20 RX ADMIN — CEFAZOLIN 2 G: 1 INJECTION, POWDER, FOR SOLUTION INTRAMUSCULAR; INTRAVENOUS at 08:52

## 2023-10-20 RX ADMIN — PROPOFOL 100 MCG/KG/MIN: 10 INJECTION, EMULSION INTRAVENOUS at 08:41

## 2023-10-20 RX ADMIN — LIDOCAINE HYDROCHLORIDE 40 ML: 20 INJECTION, SOLUTION INFILTRATION; PERINEURAL at 08:41

## 2023-10-20 RX ADMIN — FENTANYL CITRATE 25 MCG: 50 INJECTION, SOLUTION INTRAMUSCULAR; INTRAVENOUS at 08:41

## 2023-10-20 RX ADMIN — PROPOFOL 40 MG: 10 INJECTION, EMULSION INTRAVENOUS at 08:42

## 2023-10-20 RX ADMIN — SODIUM CHLORIDE, POTASSIUM CHLORIDE, SODIUM LACTATE AND CALCIUM CHLORIDE 100 ML/HR: 600; 310; 30; 20 INJECTION, SOLUTION INTRAVENOUS at 08:00

## 2023-10-20 RX ADMIN — FENTANYL CITRATE 25 MCG: 50 INJECTION, SOLUTION INTRAMUSCULAR; INTRAVENOUS at 08:49

## 2023-10-20 RX ADMIN — ONDANSETRON 4 MG: 2 INJECTION INTRAMUSCULAR; INTRAVENOUS at 08:46

## 2023-10-20 RX ADMIN — CEFAZOLIN 2 G: 1 INJECTION, POWDER, FOR SOLUTION INTRAMUSCULAR; INTRAVENOUS at 08:48

## 2023-10-20 RX ADMIN — SODIUM CHLORIDE, SODIUM LACTATE, POTASSIUM CHLORIDE, AND CALCIUM CHLORIDE: .6; .31; .03; .02 INJECTION, SOLUTION INTRAVENOUS at 08:26

## 2023-10-20 ASSESSMENT — PAIN SCALES - GENERAL
PAINLEVEL_OUTOF10: 0 - NO PAIN
PAINLEVEL_OUTOF10: 0 - NO PAIN
PAINLEVEL_OUTOF10: 3
PAINLEVEL_OUTOF10: 0 - NO PAIN
PAIN_LEVEL: 0

## 2023-10-20 ASSESSMENT — COLUMBIA-SUICIDE SEVERITY RATING SCALE - C-SSRS
2. HAVE YOU ACTUALLY HAD ANY THOUGHTS OF KILLING YOURSELF?: NO
1. IN THE PAST MONTH, HAVE YOU WISHED YOU WERE DEAD OR WISHED YOU COULD GO TO SLEEP AND NOT WAKE UP?: NO
6. HAVE YOU EVER DONE ANYTHING, STARTED TO DO ANYTHING, OR PREPARED TO DO ANYTHING TO END YOUR LIFE?: NO

## 2023-10-20 ASSESSMENT — PAIN - FUNCTIONAL ASSESSMENT
PAIN_FUNCTIONAL_ASSESSMENT: 0-10

## 2023-10-20 ASSESSMENT — PAIN DESCRIPTION - DESCRIPTORS: DESCRIPTORS: ACHING

## 2023-10-20 NOTE — ANESTHESIA PREPROCEDURE EVALUATION
Patient: Alexis Yap    Procedure Information       Date/Time: 10/20/23 0915    Procedure: Right ring finger trigger release (Right: Hand)    Location: [facility] OR 02 / [facility]    Surgeons: Enio Roldan MD            Relevant Problems   Anesthesia (within normal limits)      Cardiovascular   (+) Abnormal EKG   (+) Atypical chest pain (Resolved)   (+) HTN (hypertension)   (+) Hypercholesteremia      Endocrine   (+) Hyperthyroidism (Resolved)   (+) Hypothyroidism   (+) Morbid obesity (CMS/HCC)      GI   (+) Esophageal reflux   (+) Irritable bowel syndrome with diarrhea   (+) Rectal bleeding      Neuro/Psych   (+) Anxiety      Pulmonary   (+) Lung nodules      Musculoskeletal   (+) Knee osteoarthritis   (+) Primary osteoarthritis of both hips      Infectious Disease   (+) Helicobacter pylori (H. pylori) infection      Other   (+) Arthritis of both knees   (+) Hip arthritis       Clinical information reviewed:   Tobacco  Allergies  Meds   Med Hx  Surg Hx  OB Status  Fam Hx  Soc   Hx        NPO Detail:  NPO/Void Status  Carbonhydrate Drink Given Prior to Surgery? : N  Date of Last Liquid: 10/19/23  Time of Last Liquid: 2000  Date of Last Solid: 10/19/23  Time of Last Solid: 2000  Last Intake Type: Food; Clear fluids         Physical Exam    Airway  Mallampati: III  TM distance: >3 FB  Neck ROM: full     Cardiovascular    Dental - normal exam     Pulmonary    Abdominal        Anesthesia Plan    ASA 2     MAC     Anesthetic plan and risks discussed with patient and spouse.    Plan discussed with CAA.

## 2023-10-20 NOTE — H&P
H&P reviewed. The patient was examined and there are no changes to the H&P. Patient electing to proceed with surgery. Patient marked and consented.     Roosevelt Lundy MD  PGY-3 Orthopedic Surgery  [facility]  Phone: [phone]  Pager: [phone]  Available by Epic Message

## 2023-10-20 NOTE — ANESTHESIA POSTPROCEDURE EVALUATION
Patient: Alexis Savageekic    Procedure Summary       Date: 10/20/23 Room / Location: [facility] OR 02 / [facility] OR    Anesthesia Start: 0827 Anesthesia Stop: 0905    Procedure: Right ring finger trigger release (Right: Hand) Diagnosis:       Trigger ring finger of right hand      (Trigger ring finger of right hand [M65.341])    Surgeons: Enio Roldan MD Responsible Provider: Panda Hansen DO    Anesthesia Type: MAC ASA Status: 2            Anesthesia Type: MAC    Vitals Value Taken Time   /59 10/20/23 0908   Temp 36.2 10/20/23 0908   Pulse 71 10/20/23 0908   Resp 14 10/20/23 0908   SpO2 98 10/20/23 0908       Anesthesia Post Evaluation    Patient location during evaluation: PACU  Patient participation: complete - patient participated  Level of consciousness: awake and alert  Pain score: 0  Pain management: adequate  Airway patency: patent  Two or more strategies used to mitigate risk of obstructive sleep apnea  Cardiovascular status: acceptable  Respiratory status: acceptable  Hydration status: acceptable  Comments: Pt I in pacu, report given to PACU RN, VVS, pain management implemented.      No notable events documented.

## 2023-10-20 NOTE — OP NOTE
Right ring finger trigger release (R) Operative Note     Date: 10/20/2023  OR Location: [facility] OR    Name: Alexis Yap : 1960, Age: 63 y.o., MRN: [id number], Sex: female    Diagnosis  Pre-op Diagnosis     * Trigger ring finger of right hand [M65.341] Post-op Diagnosis     * Trigger ring finger of right hand [M65.341]     Procedures  Right ring finger trigger release  03919 - HI TENDON SHEATH INCISION      Surgeons      * Enio Roldan - Primary     * Enio Roldan    Resident/Fellow/Other Assistant:  Surgeon(s) and Role:     * Enio Roldan MD    Procedure Summary  Anesthesia: Monitor Anesthesia Care  ASA: II  Anesthesia Staff: Anesthesiologist: Panda Hansen DO  CRNA: AUDRA Mckeon  Estimated Blood Loss: 0 mL  Intra-op Medications: * Intraprocedure medication information is unavailable because the case start and end events have not been set *        Specimen: No specimens collected     Staff:   Circulator: Sofia Fong RN; Kristal Lakhani RN  Scrub Person: Mikie Cuadra         Drains and/or Catheters: * None in log *    Tourniquet Times:     Total Tourniquet Time Documented:  Arm - Upper (Right) - 8 minutes  Total: Arm - Upper (Right) - 8 minutes      Implants:     Findings: right ring trigger finger    Indications: Alexis Yap is an 63 y.o. female who is having surgery for Trigger ring finger of right hand [M65.341].     The patient was seen in the preoperative area. The risks, benefits, complications, treatment options, non-operative alternatives, expected recovery and outcomes were discussed with the patient. The possibilities of reaction to medication, pulmonary aspiration, injury to surrounding structures, bleeding, recurrent infection, the need for additional procedures, failure to diagnose a condition, and creating a complication requiring transfusion or operation were discussed with the patient. The patient concurred with the proposed plan, giving informed consent.   The site of surgery was properly noted/marked if necessary per policy. The patient has been actively warmed in preoperative area. Preoperative antibiotics have been ordered and given within 1 hours of incision. Venous thrombosis prophylaxis have been ordered including bilateral sequential compression devices    Procedure Details:   Patient was brought to the operating room placed supine on the operating table.  A timeout procedure was performed to verify the correct patient, procedure and operative site.  Intravenous antibiotics were administered.  After appropriate mount of IV sedation achieved local anesthetic was infiltrated in the distal palm in line with the ring finger.  Right upper extremity was then prepped and draped in usual sterile fashion.  Limb was exsanguinated and a tourniquet was inflated to 250 mmHg.    We made an incision in the distal palmar crease overlying the ring finger flexor tendon sheath.  Blunt dissection was carried down to expose the sheath.  Deep retractors were placed.  The sheath was incised.  There was hypertrophic tenosynovium encasing the flexor tendons.  The A1 pulley was exposed then divided longitudinally.  A limited tenosynovectomy was performed.  We were able to demonstrate independent tendon gliding.  No further triggering was identified.  The wound was irrigated and then closed in interrupted fashion.  A sterile bandage was applied and the tourniquet was deflated.  The patient was awoken from her sedation and transferred to recovery in stable condition.    Postoperatively she will be discharged home once comfortable.  She can remove her bandage on postop day #4 and begin wound care with gentle activities as instructed.  Return to clinic in 2 weeks for wound check and advancement of her activities.        Complications:  None; patient tolerated the procedure well.    Disposition: PACU - hemodynamically stable.  Condition: stable         Additional Details:     Attending  Attestation: I was present and scrubbed for the entire procedure.    Enio Roldan  Phone Number: [phone]

## 2023-11-06 ENCOUNTER — OFFICE VISIT (OUTPATIENT)
Dept: ORTHOPEDIC SURGERY | Facility: CLINIC | Age: 63
End: 2023-11-06
Payer: COMMERCIAL

## 2023-11-06 DIAGNOSIS — M65.341 TRIGGER RING FINGER OF RIGHT HAND: Primary | ICD-10-CM

## 2023-11-06 PROCEDURE — 1036F TOBACCO NON-USER: CPT | Performed by: ORTHOPAEDIC SURGERY

## 2023-11-06 PROCEDURE — 99024 POSTOP FOLLOW-UP VISIT: CPT | Performed by: ORTHOPAEDIC SURGERY

## 2023-11-06 PROCEDURE — 3079F DIAST BP 80-89 MM HG: CPT | Performed by: ORTHOPAEDIC SURGERY

## 2023-11-06 PROCEDURE — 3077F SYST BP >= 140 MM HG: CPT | Performed by: ORTHOPAEDIC SURGERY

## 2023-11-07 NOTE — PROGRESS NOTES
First postoperative visit after right ring finger trigger finger release performed on 20th.  Overall doing great.  Minimal incisional tenderness.  Returning to all normal activities.    Examination reveals healed surgical incision.  Minimal incisional tenderness.  Full digital motion.  No triggering.  Sensation intact to light touch.    Impression: Right ring finger trigger finger.    Plan: I have no restrictions for her.  Progressive use of the hand as tolerated.  Return as needed if any further issues arise.    Enio Roldan MD    [employer]  Department of Orthopaedic Surgery  Chief of Hand and Upper Extremity Surgery  [facility]    Dictation performed with the use of voice recognition software. Syntax and grammatical errors may exist.

## 2023-11-22 ENCOUNTER — OFFICE VISIT (OUTPATIENT)
Dept: OBSTETRICS AND GYNECOLOGY | Facility: CLINIC | Age: 63
End: 2023-11-22
Payer: COMMERCIAL

## 2023-11-22 VITALS
DIASTOLIC BLOOD PRESSURE: 80 MMHG | BODY MASS INDEX: 38.57 KG/M2 | HEIGHT: 66 IN | WEIGHT: 240 LBS | SYSTOLIC BLOOD PRESSURE: 138 MMHG

## 2023-11-22 DIAGNOSIS — Z12.31 SCREENING MAMMOGRAM FOR BREAST CANCER: ICD-10-CM

## 2023-11-22 DIAGNOSIS — Z01.419 WELL WOMAN EXAM: Primary | ICD-10-CM

## 2023-11-22 PROCEDURE — 1036F TOBACCO NON-USER: CPT | Performed by: OBSTETRICS & GYNECOLOGY

## 2023-11-22 PROCEDURE — 3079F DIAST BP 80-89 MM HG: CPT | Performed by: OBSTETRICS & GYNECOLOGY

## 2023-11-22 PROCEDURE — 3075F SYST BP GE 130 - 139MM HG: CPT | Performed by: OBSTETRICS & GYNECOLOGY

## 2023-11-22 PROCEDURE — 99396 PREV VISIT EST AGE 40-64: CPT | Performed by: OBSTETRICS & GYNECOLOGY

## 2023-11-22 NOTE — PROGRESS NOTES
Subjective   Patient ID: Alexis Yap is a 63 y.o. female who presents for Well woman visit.  Established patient annual exam.  63 years old.  2 children.    He stopped her Premarin cream and Myrbetriq.  She is doing Kegels and using natural supplements.  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  She has 3 grandchildren.  Non-smoker.  No vaginal bleeding    Last Pap 2022 was negative negative.    On exam breast abdominal pelvic exam is normal.    Well woman exam.  Order for mammogram        Review of Systems   All other systems reviewed and are negative.      Objective   Physical Exam  Constitutional:       Appearance: Normal appearance. She is obese.   Chest:   Breasts:     Right: Normal.      Left: Normal.   Genitourinary:     General: Normal vulva.      Vagina: Normal.      Cervix: Normal.      Uterus: Normal.       Adnexa: Right adnexa normal and left adnexa normal.   Neurological:      Mental Status: She is alert.         Assessment/Plan   Well woman exam.  Order mammogram.  Follow-up 1 year

## 2023-12-04 ENCOUNTER — TELEPHONE (OUTPATIENT)
Dept: DERMATOLOGY | Facility: CLINIC | Age: 63
End: 2023-12-04
Payer: COMMERCIAL

## 2023-12-04 NOTE — TELEPHONE ENCOUNTER
Patient left message saying she received a letter about needing treatment for a previous biopsy. She would like a call back to discuss. I returned the call and spoke with patient. Explained that the lesion on the mid upper back had been treated, but the lesion on the left mid back had not. Follow up appointment scheduled for 12/8/2023 to recheck spot.

## 2023-12-06 ENCOUNTER — APPOINTMENT (OUTPATIENT)
Dept: ORTHOPEDIC SURGERY | Facility: CLINIC | Age: 63
End: 2023-12-06
Payer: COMMERCIAL

## 2023-12-07 ENCOUNTER — OFFICE VISIT (OUTPATIENT)
Dept: ORTHOPEDIC SURGERY | Facility: CLINIC | Age: 63
End: 2023-12-07
Payer: COMMERCIAL

## 2023-12-07 ENCOUNTER — ANCILLARY PROCEDURE (OUTPATIENT)
Dept: RADIOLOGY | Facility: CLINIC | Age: 63
End: 2023-12-07
Payer: COMMERCIAL

## 2023-12-07 DIAGNOSIS — M25.551 PAIN OF RIGHT HIP: ICD-10-CM

## 2023-12-07 DIAGNOSIS — M17.0 PRIMARY OSTEOARTHRITIS OF BOTH KNEES: ICD-10-CM

## 2023-12-07 DIAGNOSIS — M17.0 PRIMARY OSTEOARTHRITIS OF BOTH KNEES: Primary | ICD-10-CM

## 2023-12-07 PROCEDURE — 73502 X-RAY EXAM HIP UNI 2-3 VIEWS: CPT | Mod: RIGHT SIDE | Performed by: RADIOLOGY

## 2023-12-07 PROCEDURE — 1036F TOBACCO NON-USER: CPT | Performed by: STUDENT IN AN ORGANIZED HEALTH CARE EDUCATION/TRAINING PROGRAM

## 2023-12-07 PROCEDURE — 73562 X-RAY EXAM OF KNEE 3: CPT | Mod: RT,FY

## 2023-12-07 PROCEDURE — 73502 X-RAY EXAM HIP UNI 2-3 VIEWS: CPT | Mod: RT

## 2023-12-07 PROCEDURE — 99204 OFFICE O/P NEW MOD 45 MIN: CPT | Performed by: STUDENT IN AN ORGANIZED HEALTH CARE EDUCATION/TRAINING PROGRAM

## 2023-12-07 PROCEDURE — 73560 X-RAY EXAM OF KNEE 1 OR 2: CPT | Mod: LT

## 2023-12-07 ASSESSMENT — PAIN - FUNCTIONAL ASSESSMENT: PAIN_FUNCTIONAL_ASSESSMENT: 0-10

## 2023-12-07 ASSESSMENT — PAIN SCALES - GENERAL: PAINLEVEL_OUTOF10: 4

## 2023-12-07 ASSESSMENT — PAIN DESCRIPTION - DESCRIPTORS: DESCRIPTORS: ACHING

## 2023-12-07 NOTE — PROGRESS NOTES
REFERRAL SOURCE: No ref. provider found     CHIEF COMPLAINT: bilateral knee pain, right hip pain    HISTORY OF PRESENT ILLNESS  Alexis Yap is a very pleasant 63 y.o. female with history of HTN, obesity, GERD, vitamin D deficiency who is here for evaluation of bilateral knee pain and right hip pain     12/7/23: She last had Euflexxa injections by Dr. Beard with series completed on 3/6/2021 (note reviewed).  Today, she complains of left worse than right knee pain.  Pain can be severe and is located medially.  Denies radiation of the pain.  Pain is worse with activity, especially stairs, and improves with rest.  She has tried over-the-counter and prescription NSAIDs, Tylenol, topical creams, physical therapy, corticosteroid injections in the past without significant or lasting relief.  She was getting Euflexxa injections in the past that lasted 6 months by Dr. Beard.  However, she just stopped getting them hoping that they would last longer, but they have not and her pain has returned to becoming severe.    MEDS    Current Outpatient Medications:     acetaminophen (Tylenol) 325 mg tablet, Take 3 tablets (975 mg) by mouth every 8 hours., Disp: , Rfl:     acetaminophen (Tylenol) 500 mg tablet, Take 2 tablets (1,000 mg) by mouth 3 times a day., Disp: , Rfl:     ALPRAZolam (Xanax) 0.5 mg tablet, Take 1 tablet (0.5 mg) by mouth once daily as needed for anxiety., Disp: 20 tablet, Rfl: 0    aspirin 81 mg EC tablet, Take 1 tablet (81 mg) by mouth once daily., Disp: , Rfl:     buPROPion XL (Wellbutrin XL) 300 mg 24 hr tablet, Take 1 tablet (300 mg) by mouth once daily in the morning. Do not crush, chew, or split., Disp: 30 tablet, Rfl: 1    cholecalciferol (Vitamin D-3) 125 MCG (5000 UT) capsule, Take by mouth., Disp: , Rfl:     glucosamine-chondroitin 500-400 mg tablet, Take 1 tablet by mouth 3 times a day., Disp: , Rfl:     hydroCHLOROthiazide (HYDRODiuril) 25 mg tablet, Take 1 tablet (25 mg) by mouth once daily., Disp: 90  tablet, Rfl: 1    ibuprofen 800 mg tablet, Take 1 tablet (800 mg) by mouth 3 times a day after meals., Disp: , Rfl:     levothyroxine (Synthroid, Levoxyl) 125 mcg tablet, Take 1 tablet (125 mcg) by mouth once daily., Disp: 90 tablet, Rfl: 1    magnesium oxide (Mag-Ox) 400 mg tablet, 1 tablet (400 mg) once daily., Disp: , Rfl:     metoprolol tartrate (Lopressor) 50 mg tablet, TAKE 1 TABLET TWICE A DAY (NEED APPOINTMENT, LAST FILL), Disp: 180 tablet, Rfl: 1    multivitamin (One Daily Multivitamin) tablet, Take 1 tablet by mouth once daily., Disp: , Rfl:     potassium chloride CR (Klor-Con M20) 20 mEq ER tablet, TAKE 1 TABLET DAILY (LAST FILL NEED APPOINTMENT), Disp: 90 tablet, Rfl: 1    rosuvastatin (Crestor) 10 mg tablet, Take 1 tablet (10 mg) by mouth once daily., Disp: , Rfl:     ALLERGIES  Allergies   Allergen Reactions    Butalbital-Acetaminop-Caf-Cod Shortness of breath     severe stomach pain-came into the ED  Oct 17 pt states he did go to ED but felt SOB and could not catch her breath    Butalbital-Acetaminophen-Caff Shortness of breath    Codeine Shortness of breath    Naproxen Sodium Rash       PAST MEDICAL HISTORY  Past Medical History:   Diagnosis Date    Acute sinusitis, unspecified 08/19/2013    Acute sinusitis    Allergy status to unspecified drugs, medicaments and biological substances     History of allergy    Chronic bronchitis (CMS/HCC)     Depression     Encounter for other general examination     Podiatry visit, routine    GERD (gastroesophageal reflux disease)     Hallux rigidus, unspecified foot     Hallux rigidus    Hypertension     Hypothyroidism     Irritable bowel syndrome     Other gastritis without bleeding 08/11/2016    Bile reflux gastritis    Other specified anxiety disorders     Depression with anxiety    Pain in left ankle and joints of left foot     Ankle pain, left    Personal history of other diseases of the musculoskeletal system and connective tissue 08/19/2013    History of  backache    Personal history of other diseases of the musculoskeletal system and connective tissue     History of tendinitis    Personal history of other diseases of the nervous system and sense organs     History of migraine headaches    Personal history of other diseases of the respiratory system 08/19/2013    History of acute bronchitis    Personal history of other endocrine, nutritional and metabolic disease 08/19/2013    History of hypothyroidism    Personal history of other specified conditions     History of urinary frequency       PAST SURGICAL HISTORY  Past Surgical History:   Procedure Laterality Date    BLADDER SURGERY  09/20/2017    Bladder Surgery    BUNIONECTOMY  09/20/2017    Simple Bunion Exostectomy ([facility] Procedure)    CHOLECYSTECTOMY      GALLBLADDER SURGERY  09/20/2017    Gallbladder Surgery    HEMORRHOID SURGERY  09/20/2017    Hemorrhoidectomy    HYSTERECTOMY  09/20/2017    Hysterectomy    JOINT REPLACEMENT      OTHER SURGICAL HISTORY  02/27/2014    Hysterectomy Robotic-Assisted    TONSILLECTOMY  08/19/2013    Tonsillectomy       SOCIAL HISTORY   Social History     Socioeconomic History    Marital status:      Spouse name: Not on file    Number of children: Not on file    Years of education: Not on file    Highest education level: Not on file   Occupational History    Not on file   Tobacco Use    Smoking status: Never    Smokeless tobacco: Never   Vaping Use    Vaping Use: Never used   Substance and Sexual Activity    Alcohol use: Never     Comment: occ wine every few weeks    Drug use: Never    Sexual activity: Defer   Other Topics Concern    Not on file   Social History Narrative    Not on file     Social Determinants of Health     Financial Resource Strain: Not on file   Food Insecurity: Not on file   Transportation Needs: Not on file   Physical Activity: Not on file   Stress: Not on file   Social Connections: Not on file   Intimate Partner Violence: Not on file   Housing Stability:  Not on file       FAMILY HISTORY  Family History   Problem Relation Name Age of Onset    Colon cancer Mother         REVIEW OF SYSTEMS  Except for those mentioned in the history of present illness, and below, a complete review of systems is negative.     Review of Systems    VITALS  There were no vitals filed for this visit.    PHYSICAL EXAMINATION   GENERAL:  Awake, alert, and oriented, no apparent distress, pleasant, and cooperative  PSYC: Mood is euthymic, affect is congruent  EAR, NOSE, THROAT:  Normocephalic, atraumatic, moist membranes, anicteric sclera  LUNG: Nonlabored breathing  HEART: No clubbing or cyanosis  SKIN: No increased erythema, warmth, rashes, or concerning skin lesions  NEURO: Sensation is intact in the bilateral lower extremities. Strength is grossly 5 out of 5 throughout the bilateral lower extremities, unless noted below.  GAIT: Antalgic  MUSCULOSKELETAL: Examination of the bilateral knee: Range of motion is 0-120.  No effusion.  Tender to palpation over the medial lateral joint lines.  No other areas of tenderness about the knee.    IMAGING STUDIES:     Radiographs of the right hip dated 12/7/2023 were personally reviewed and interpreted by me, Dr. Sabrina Jewell, and the findings shared with the patient.  There is evidence of severe right hip osteoarthrosis.    Radiographs of bilateral knees dated 12/7/2023 were personally reviewed and interpreted by me, Dr. Sabrina Jewell, and the findings shared with the patient.  There is evidence of moderate/severe bilateral medial compartment joint space narrowing.    IMPRESSION  #1  Acute exacerbation of chronic bilateral knee osteoarthritis    PLAN  The following was discussed with the patient:     Alexis Yap is a very pleasant 63 y.o. female with history of HTN, obesity, GERD, vitamin D deficiency who is here for evaluation of bilateral knee pain and right hip pain due to acute exacerbation of chronic bilateral knee osteoarthritis.  -The  diagnosis of knee arthritis was discussed in detail. The only cure for arthritis is a knee replacement. Without curing arthritis, we can improve the pain that the patient experiences and hopefully allow them to continue to do the activities that they enjoy.  -Physical therapy: Work on hip abductor, knee extensor, core strengthening and flexibility with PT. continue home exercise program.  -Weight loss and physical activity: The benefits of weight loss and physical activity on improving pain experienced with arthritis were discussed.  -Bracing: Knee bracing can be considered.  -Medications: She has tried Tylenol, oral, topical NSAIDs without significant relief.  -Injections: Injections, such as corticosteroid, viscosupplementation, and PRP can be utilized. The pros, cons, risks, benefits, pre-procedure and post-procedure protocols were discussed.  She was to proceed with bilateral knee viscosupplementation injections, as corticosteroid injections have not helped.  She is gotten great relief with Euflexxa injections in the past  -Follow-up for bilateral knee viscosupplementation injections.  -Additionally, she complains of right hip pain and has severe osteoarthrosis on radiographs.  She prefers to meet with a surgeon rather than undergo injections and nonsurgical management at this time.  She was given the cards of several surgeons.    The patient was counseled to remain active, but avoid activities that worsen symptoms. The patient was in agreement with this plan. All questions were answered to the best of my ability.    PATIENT EDUCATION:  Education was discussed at today's appointment. A learning needs assessment was performed.    Primary learner: Alexis Yap  Barriers to learning: None  Preferred language: English  Learning preferences include: Seeing and doing.  Discussed: Diagnosis and treatment plan.  Demonstrated: Understanding of material discussed.  Patient education materials given: None.  Learner  response: Learner demonstrated understanding.    This note was dictated using Dragon speech recognition software and was not corrected for spelling or grammatical errors.

## 2023-12-08 ENCOUNTER — OFFICE VISIT (OUTPATIENT)
Dept: DERMATOLOGY | Facility: CLINIC | Age: 63
End: 2023-12-08
Payer: COMMERCIAL

## 2023-12-08 DIAGNOSIS — D48.5 NEOPLASM OF UNCERTAIN BEHAVIOR OF SKIN: Primary | ICD-10-CM

## 2023-12-08 PROCEDURE — 88305 TISSUE EXAM BY PATHOLOGIST: CPT | Performed by: DERMATOLOGY

## 2023-12-08 PROCEDURE — 1036F TOBACCO NON-USER: CPT | Performed by: STUDENT IN AN ORGANIZED HEALTH CARE EDUCATION/TRAINING PROGRAM

## 2023-12-08 PROCEDURE — 88305 TISSUE EXAM BY PATHOLOGIST: CPT | Mod: TC,DER | Performed by: STUDENT IN AN ORGANIZED HEALTH CARE EDUCATION/TRAINING PROGRAM

## 2023-12-08 PROCEDURE — 11302 SHAVE SKIN LESION 1.1-2.0 CM: CPT | Performed by: STUDENT IN AN ORGANIZED HEALTH CARE EDUCATION/TRAINING PROGRAM

## 2023-12-08 ASSESSMENT — DERMATOLOGY QUALITY OF LIFE (QOL) ASSESSMENT
WHAT SINGLE SKIN CONDITION LISTED BELOW IS THE PATIENT ANSWERING THE QUALITY-OF-LIFE ASSESSMENT QUESTIONS ABOUT: NONE OF THE ABOVE
RATE HOW BOTHERED YOU ARE BY SYMPTOMS OF YOUR SKIN PROBLEM (EG, ITCHING, STINGING BURNING, HURTING OR SKIN IRRITATION): 3
RATE HOW EMOTIONALLY BOTHERED YOU ARE BY YOUR SKIN PROBLEM (FOR EXAMPLE, WORRY, EMBARRASSMENT, FRUSTRATION): 3
DATE THE QUALITY-OF-LIFE ASSESSMENT WAS COMPLETED: 66816
RATE HOW BOTHERED YOU ARE BY EFFECTS OF YOUR SKIN PROBLEMS ON YOUR ACTIVITIES (EG, GOING OUT, ACCOMPLISHING WHAT YOU WANT, WORK ACTIVITIES OR YOUR RELATIONSHIPS WITH OTHERS): 0 - NEVER BOTHERED
ARE THERE EXCLUSIONS OR EXCEPTIONS FOR THE QUALITY OF LIFE ASSESSMENT: NO

## 2023-12-08 ASSESSMENT — DERMATOLOGY PATIENT ASSESSMENT
ARE YOU AN ORGAN TRANSPLANT RECIPIENT: NO
DO YOU USE A TANNING BED: NO
HAVE YOU HAD OR DO YOU HAVE VASCULAR DISEASE: NO
DO YOU HAVE ANY NEW OR CHANGING LESIONS: NO
DO YOU USE SUNSCREEN: DAILY
DO YOU HAVE IRREGULAR MENSTRUAL CYCLES: NO
HAVE YOU HAD OR DO YOU HAVE A STAPH INFECTION: NO
ARE YOU TRYING TO GET PREGNANT: NO
ARE YOU ON BIRTH CONTROL: NO

## 2023-12-08 ASSESSMENT — ITCH NUMERIC RATING SCALE: HOW SEVERE IS YOUR ITCHING?: 0

## 2023-12-08 ASSESSMENT — PATIENT GLOBAL ASSESSMENT (PGA): PATIENT GLOBAL ASSESSMENT: PATIENT GLOBAL ASSESSMENT:  1 - CLEAR

## 2023-12-08 NOTE — PROGRESS NOTES
Prabhu Yap is a 63 y.o. female who presents for the following: Follow-up (Lesion on  back recheck).     Review of Systems:  No other skin or systemic complaints other than what is documented elsewhere in the note.    The following portions of the chart were reviewed this encounter and updated as appropriate:          Skin Cancer History  No skin cancer on file.      Specialty Problems          Dermatology Problems    Melanocytic nevi of left upper limb, including shoulder    Melanocytic nevi of right upper limb, including shoulder    Melanocytic nevi of trunk    Neoplasm of uncertain behavior of skin    Other hypertrophic disorders of the skin    Skin changes due to chronic exposure to nonionizing radiation, unspecified    Multiple acquired skin tags        Objective   Well appearing patient in no apparent distress; mood and affect are within normal limits.    A focused skin examination was performed. All findings within normal limits unless otherwise noted below.    Assessment/Plan   1. Neoplasm of uncertain behavior of skin  Left Lower Back  Well healed scar  Moderately atypical nevus, re-scoop excision  Shave removal    Informed consent: discussed and consent obtained    Timeout: patient name, date of birth, surgical site, and procedure verified    Procedure prep:  Patient was prepped and draped  Anesthesia: the lesion was anesthetized in a standard fashion    Anesthetic:  1% lidocaine w/ epinephrine 1-100,000 local infiltration  Instrument used: DermaBlade    Hemostasis achieved with: aluminum chloride    Outcome: patient tolerated procedure well    Post-procedure details: sterile dressing applied and wound care instructions given    Dressing type: bandage and petrolatum      Shave removal  Size is 1cm  Margins 2mm  Size of excision 1.4cm

## 2023-12-12 LAB
LABORATORY COMMENT REPORT: NORMAL
PATH REPORT.FINAL DX SPEC: NORMAL
PATH REPORT.GROSS SPEC: NORMAL
PATH REPORT.MICROSCOPIC SPEC OTHER STN: NORMAL
PATH REPORT.RELEVANT HX SPEC: NORMAL
PATH REPORT.TOTAL CANCER: NORMAL

## 2023-12-19 ENCOUNTER — HOSPITAL ENCOUNTER (OUTPATIENT)
Dept: RADIOLOGY | Facility: HOSPITAL | Age: 63
Discharge: HOME | End: 2023-12-19
Payer: COMMERCIAL

## 2023-12-19 VITALS — WEIGHT: 240 LBS | BODY MASS INDEX: 38.57 KG/M2 | HEIGHT: 66 IN

## 2023-12-19 DIAGNOSIS — Z12.31 SCREENING MAMMOGRAM FOR BREAST CANCER: ICD-10-CM

## 2023-12-19 PROCEDURE — 77063 BREAST TOMOSYNTHESIS BI: CPT

## 2023-12-27 DIAGNOSIS — M17.0 PRIMARY OSTEOARTHRITIS OF BOTH KNEES: ICD-10-CM

## 2024-01-03 DIAGNOSIS — F41.9 ANXIETY: ICD-10-CM

## 2024-01-03 RX ORDER — BUPROPION HYDROCHLORIDE 300 MG/1
300 TABLET ORAL EVERY MORNING
Qty: 30 TABLET | Refills: 0 | Status: SHIPPED | OUTPATIENT
Start: 2024-01-03 | End: 2024-01-26 | Stop reason: SDUPTHER

## 2024-01-07 NOTE — PROGRESS NOTES
"  Blanca Morel MD    Adult Reconstruction and Joint Replacement Surgery   Phone: [phone]     Fax: [phone]       INITIAL CONSULTATION         Date of Visit:  1/8/2024      CC: Right hip pain      HPI:   Alexis Yap is a 63 y.o. female who presents for evaluation of right hip pain. The patient reports 10 years of RIGHT hip pain. The patient has tried at least 3 months of conservative treatments, including NSAIDs and Activity modification, and continues to have disabling pain, impaired activities of daily living and worsened quality of life. She has not had steroid injections for her right hip.  They rate their pain as 9/10.      Patient does have pain at night. Patient is able to walk 2-3 blocks. Patient is currently using nothing as assistive device. Primarily complains of groin, lateral and posterior hip  pain. Patient has difficulty with climbing stairs, getting up from a chair, and prolonged sitting . The pain is worse with prolonged sitting or rest. In the morning her hip is very stiff and painful which takes a while to go away. The pain is significantly impacting her ability to perform activities of daily living. Patient reports no longer able to do activities such as.      She had a ULYSSES 3 years ago with Dr. Hill and is very happy with the results.  The patient reports he had an anterior hip replacement on the left side.  This was complicated by delayed wound healing where she describes a \"hole\" over her anterior hip incision since that was draining. This require wound packing and wet-to-dry dressings for a number of weeks.  This did ultimately heal on its own.     Focused History   PMH: Reviewed and PE/DVT: None    PSH: Reviewed , Hip/Knee replacement: L ULYSSES 3 years ago, Liz Hip/Knee surgery: L ULYSSES, Anesthesia complications: None, Spine surgery: None, Surgical infection: None, and Weight loss surgery: None    Meds: Reviewed, Current Anticoagulants: None, Weight loss medication: " None, and Current Opioids: None    Allergies: Reviewed  and The patient reports no contraindications or allergies to cephalosporins, aspirin, NSAIDs or opioids, except as noted above.   FH: No family history of any bleeding or clotting disorders.   SHx: Reviewed, Current smoker: No, and EtOH intake weekly: 1 drink per week    Christian: no      PROMs    None     Past Medical History:   Diagnosis Date    Acute sinusitis, unspecified 08/19/2013    Acute sinusitis    Allergy status to unspecified drugs, medicaments and biological substances     History of allergy    Chronic bronchitis (CMS/HCC)     Depression     Encounter for other general examination     Podiatry visit, routine    GERD (gastroesophageal reflux disease)     Hallux rigidus, unspecified foot     Hallux rigidus    Hypertension     Hypothyroidism     Irritable bowel syndrome     Other gastritis without bleeding 08/11/2016    Bile reflux gastritis    Other specified anxiety disorders     Depression with anxiety    Pain in left ankle and joints of left foot     Ankle pain, left    Personal history of other diseases of the musculoskeletal system and connective tissue 08/19/2013    History of backache    Personal history of other diseases of the musculoskeletal system and connective tissue     History of tendinitis    Personal history of other diseases of the nervous system and sense organs     History of migraine headaches    Personal history of other diseases of the respiratory system 08/19/2013    History of acute bronchitis    Personal history of other endocrine, nutritional and metabolic disease 08/19/2013    History of hypothyroidism    Personal history of other specified conditions     History of urinary frequency      The patient denies a history of deep vein thrombosis, pulmonary embolism or problems with anesthesia in the past.      Past Medical History:   Diagnosis Date    Acute sinusitis, unspecified 08/19/2013    Acute sinusitis     Allergy status to unspecified drugs, medicaments and biological substances     History of allergy    Chronic bronchitis (CMS/HCC)     Depression     Encounter for other general examination     Podiatry visit, routine    GERD (gastroesophageal reflux disease)     Hallux rigidus, unspecified foot     Hallux rigidus    Hypertension     Hypothyroidism     Irritable bowel syndrome     Other gastritis without bleeding 08/11/2016    Bile reflux gastritis    Other specified anxiety disorders     Depression with anxiety    Pain in left ankle and joints of left foot     Ankle pain, left    Personal history of other diseases of the musculoskeletal system and connective tissue 08/19/2013    History of backache    Personal history of other diseases of the musculoskeletal system and connective tissue     History of tendinitis    Personal history of other diseases of the nervous system and sense organs     History of migraine headaches    Personal history of other diseases of the respiratory system 08/19/2013    History of acute bronchitis    Personal history of other endocrine, nutritional and metabolic disease 08/19/2013    History of hypothyroidism    Personal history of other specified conditions     History of urinary frequency      Documented in chart and reviewed.       Past Surgical History:   Procedure Laterality Date    BLADDER SURGERY  09/20/2017    Bladder Surgery    BUNIONECTOMY  09/20/2017    Simple Bunion Exostectomy ([facility] Procedure)    CHOLECYSTECTOMY      GALLBLADDER SURGERY  09/20/2017    Gallbladder Surgery    HEMORRHOID SURGERY  09/20/2017    Hemorrhoidectomy    HYSTERECTOMY  09/20/2017    Hysterectomy    JOINT REPLACEMENT      OTHER SURGICAL HISTORY  02/27/2014    Hysterectomy Robotic-Assisted    TONSILLECTOMY  08/19/2013    Tonsillectomy         Allergies: She is allergic to butalbital-acetaminop-caf-cod, butalbital-acetaminophen-caff, codeine, and naproxen sodium.       Medications:   Current Outpatient  Medications   Medication Instructions    acetaminophen (Tylenol) 325 mg tablet 3 tablets, oral, Every 8 hours    acetaminophen (TYLENOL) 1,000 mg, oral, 3 times daily    ALPRAZolam (XANAX) 0.5 mg, oral, Daily PRN    aspirin 81 mg EC tablet 1 tablet, oral, Daily    buPROPion XL (WELLBUTRIN XL) 300 mg, oral, Every morning, Do not crush, chew, or split.    cholecalciferol (Vitamin D-3) 125 MCG (5000 UT) capsule oral    glucosamine-chondroitin 500-400 mg tablet 1 tablet, oral, 3 times daily    hydroCHLOROthiazide (HYDRODIURIL) 25 mg, oral, Daily    ibuprofen 800 mg, oral, 3 times daily after meals    levothyroxine (SYNTHROID, LEVOXYL) 125 mcg, oral, Daily    magnesium oxide (MAG-OX) 400 mg, Daily    metoprolol tartrate (Lopressor) 50 mg tablet TAKE 1 TABLET TWICE A DAY (NEED APPOINTMENT, LAST FILL)    multivitamin (One Daily Multivitamin) tablet 1 tablet, oral, Daily    potassium chloride CR (Klor-Con M20) 20 mEq ER tablet TAKE 1 TABLET DAILY (LAST FILL NEED APPOINTMENT)    rosuvastatin (Crestor) 10 mg tablet 1 tablet, oral, Daily    sodium hyaluronate (Euflexxa) 10 mg/mL(mw 2.4 -3.6 million) injection Inject one syringe into each knee every once a week for three weeks         Family History   Problem Relation Name Age of Onset    Colon cancer Mother        Documented in chart and reviewed.       Social History     Tobacco Use    Smoking status: Never    Smokeless tobacco: Never   Substance Use Topics    Alcohol use: Never     Comment: occ wine every few weeks         Review of Systems: Review of systems completed with medical assistant intake. Please refer to this note.       Falls: The patient denies any recent falls or fall-related injuries.      Physical Exam:   BMI: Body mass index is 39.22 kg/m²., which is abnormal. Encouraged to lose weight and to follow up with PCP.      Constitutional: The patient is well-appearing and well groomed.       Neurological/Psychiatric: The patient is alert and oriented to person,  place and time. The patient has a normal mood and affect.      Skin Examination: The skin over the right lower extremity, left lower extremity, right upper extremity, and left upper extremity is intact without any evidence of infection or rash.      Cardiovascular Examination: There are no varicosities and the skin is normal temperature, capillary refill normal, arterial pulses normal, no edema.      Lymphatic Examination: There is no lymphatic swelling or palpable lymph nodes present around the involved joint.      Neurological Examination: Bilateral lower extremities are grossly neurologically intact. Sensation normal, motor function normal.      Gait: The patient ambulates with a coxalgic gait.       Lumbar spine:      No tenderness to palpation midline.      Negative straight leg raise bilaterally.      Right Hip Examination:   Gait: Coxalgic gait.      Examination of the hip reveals the skin to be intact.      There is mild tenderness over the greater trochanter.      There is no obvious swelling.      There is a positive Stinchfield test.      Range of motion is: full extension to 80 degrees of flexion.      The hip internally rotates to 10 short of neutral degrees  and externally rotates to 20 degrees.      Abduction is 25 degrees and adduction is 10 degrees.      There is groin and buttock pain with hip motion.      There is a negative straight leg raise.      Left Hip Examination:   Examination of the hip reveals the skin to be intact.  Healed anterior approach incision.     There is no tenderness over the greater trochanter.      There is no obvious swelling.      There is a negative Stinchfield test.      Range of motion is full extension to 100 degrees of flexion.      The hip internally rotates to 20 and externally rotates 40 degrees.      Abduction is 50 degrees and adduction is 20 degrees.      There is no groin and buttock pain with hip motion.      There is a negative straight leg raise.      Right  Knee Examination:   Examination of the right knee reveals the skin to be intact. There is no obvious swelling.      There is no tenderness to palpation.      Range of motion is full extension to 120 degrees of flexion.      The knee is stable.      There is no grinding with range of motion.      There is no patellofemoral crepitus.      Left Knee Examination:   Examination of the left knee reveals the skin to be intact. There is no obvious swelling.      There is no tenderness to palpation.      Range of motion is full extension to 120 degrees of flexion.      The knee is stable.      There is no grinding with range of motion.      There is no patellofemoral crepitus.      Radiographs:   Radiographs were personally reviewed today with the patient. There is evidence of severe RIGHT  hip osteoarthritis with bone on bone apposition.  Cam and pincer lesion present.    There is an uncemented left total hip arthroplasty in place.  Old trochanteric avulsion fracture.  Increased inclination angle of the acetabular component.  No gross evidence of implant failure or loosening.     Impression:   63 y.o. year old female presents with severe RIGHT  hip osteoarthritis with bone on bone apposition. This is now affecting activities of daily living. All appropriate nonsurgical management options have been tried and failed.      Diagnosis:    Primary osteoarthritis of right hip    Femoroacetabular impingement      Recommendations / Plan:      I have discussed the options in detail with the patient. We have discussed anti-inflammatory medication, activity modification, physical therapy, corticosteroid injections, and total hip replacement surgery.      The risks and benefits of all these treatment options have been discussed in detail. The patient has tried at least 3 months of the above conservative treatments and continues to have disabling pain, impaired activities of daily living and worsened quality of life. The patient is a  candidate for RIGHT  total hip replacement. We discussed anterior and posterolateral surgical approaches to the hip joint with my rationale for posterior approach.  Posterior approach is preferred in her given her size which can make it challenging to position her components and higher likelihood of wound complication (which she did have on the left side with an anterior approach) due to overlying soft tissue. Risks and benefits of all approaches were reviewed. We discussed expected rehabilitation, DVT prophylaxis using Aspirin, time points during recovery, likely functional outcomes and long-term issues with hip replacement procedures.      We discussed the hospital stay, postoperative rehab and follow up required as well as the potential risks of surgery including bleeding, infection, need for reoperation, failure of the operation to provide symptomatic relief, leg length discrepancy, need for multiple revision surgeries in the setting of bleeding, wear, infection, instability, dislocation requiring closed and/or open reduction and/or revision, damage to major neurovascular structures, venous thrombolic disease, complications of regional and/or general anesthesia, as well as death. The patient also understands that a good result is not guaranteed and that poor outcomes can at times be unavoidable. The patient may also have persistent and chronic pain that could require further intervention. The patient confirms their understanding of the risks as well as the benefits of surgery.       We have reviewed the typical recovery after surgery and have discussed the fact that full recovery can take up to 1 year. Bearing surfaces were discussed in detail. The risks and benefits of all available bearing surfaces: metal on poly, Oxinium on poly, and dual mobility were discussed. Specifically, the risks of long-term wear and osteolysis were discussed. We also discussed the potential for metal hypersensitivity reactions that  could potentially require further surgery.       The patient does not have any of the following contraindications to arthroplasty including active infection of the hip joint, systemic bacteremia, active skin infection or an open wound at the surgical site, neuropathic arthritis or severe, rapidly progressive neurological disease.       Patient will consider proceeding with RIGHT  ULYSSES. All questions were answered today. If the patient chooses to move forward with surgical scheduling, they will be enrolled in a preoperative arthroplasty teaching class and the pre-admission testing pathway. The patient was encouraged to contact their primary care physician to discuss fitness for surgery.       Social support after surgery: Family members  Pain medication plan: Standard  Additional preoperative considerations: None     Diagnosis: M16.11  Procedure: 71632 -posterior approach  Surgery Location: Any  Equipment Requests: [employer], S-ROM available on backup  Discharge: Same-day discharge      _____________________   Blanca Morel MD    [facility]       Approximately?45?minutes were spent on the following tasks:   ????????????Preparing for the patient   ????????????Reviewing medical records   ????????????Taking a patient history   ????????????Performing a physical exam   ????????????Reviewing treatment options with the patient   ????????????Explaining the risks, potential benefits, and alternative to surgery   Explaining the expected rehabilitation after each treatment option   Explaining the potential long term expectations   Evaluating the diagnostic imaging    Templating pre-operative or current imaging   Performing surgical planning and booking   ?   This office note was transcribed with dictation software. ?Please excuse any typographical errors, program misunderstandings leading to inadvertent insertions or deletions of inappropriate wording, pronoun errors and other  unintentional transcription errors not noticed on proof-reading.

## 2024-01-08 ENCOUNTER — OFFICE VISIT (OUTPATIENT)
Dept: ORTHOPEDIC SURGERY | Facility: CLINIC | Age: 64
End: 2024-01-08
Payer: COMMERCIAL

## 2024-01-08 ENCOUNTER — ANCILLARY PROCEDURE (OUTPATIENT)
Dept: RADIOLOGY | Facility: CLINIC | Age: 64
End: 2024-01-08
Payer: COMMERCIAL

## 2024-01-08 VITALS — WEIGHT: 243 LBS | BODY MASS INDEX: 39.05 KG/M2 | HEIGHT: 66 IN

## 2024-01-08 DIAGNOSIS — M16.11 PRIMARY OSTEOARTHRITIS OF RIGHT HIP: Primary | ICD-10-CM

## 2024-01-08 DIAGNOSIS — M16.11 PRIMARY OSTEOARTHRITIS OF RIGHT HIP: ICD-10-CM

## 2024-01-08 DIAGNOSIS — M25.859 FEMOROACETABULAR IMPINGEMENT: ICD-10-CM

## 2024-01-08 PROCEDURE — 73502 X-RAY EXAM HIP UNI 2-3 VIEWS: CPT | Mod: RT

## 2024-01-08 PROCEDURE — 72170 X-RAY EXAM OF PELVIS: CPT

## 2024-01-08 PROCEDURE — 77073 BONE LENGTH STUDIES: CPT

## 2024-01-08 PROCEDURE — 1036F TOBACCO NON-USER: CPT | Performed by: STUDENT IN AN ORGANIZED HEALTH CARE EDUCATION/TRAINING PROGRAM

## 2024-01-08 PROCEDURE — 99204 OFFICE O/P NEW MOD 45 MIN: CPT | Performed by: STUDENT IN AN ORGANIZED HEALTH CARE EDUCATION/TRAINING PROGRAM

## 2024-01-08 ASSESSMENT — PAIN - FUNCTIONAL ASSESSMENT: PAIN_FUNCTIONAL_ASSESSMENT: 0-10

## 2024-01-08 ASSESSMENT — PAIN DESCRIPTION - DESCRIPTORS: DESCRIPTORS: ACHING;DULL;SORE

## 2024-01-08 ASSESSMENT — PAIN SCALES - GENERAL: PAINLEVEL_OUTOF10: 8

## 2024-01-16 PROBLEM — M16.11 UNILATERAL PRIMARY OSTEOARTHRITIS, RIGHT HIP: Status: ACTIVE | Noted: 2024-02-27

## 2024-01-26 ENCOUNTER — OFFICE VISIT (OUTPATIENT)
Dept: PRIMARY CARE | Facility: CLINIC | Age: 64
End: 2024-01-26
Payer: COMMERCIAL

## 2024-01-26 VITALS
BODY MASS INDEX: 39.21 KG/M2 | WEIGHT: 244 LBS | HEIGHT: 66 IN | SYSTOLIC BLOOD PRESSURE: 136 MMHG | DIASTOLIC BLOOD PRESSURE: 72 MMHG

## 2024-01-26 DIAGNOSIS — E78.00 HYPERCHOLESTEREMIA: ICD-10-CM

## 2024-01-26 DIAGNOSIS — F41.9 ANXIETY: ICD-10-CM

## 2024-01-26 DIAGNOSIS — M25.551 RIGHT HIP PAIN: Primary | ICD-10-CM

## 2024-01-26 DIAGNOSIS — E03.9 HYPOTHYROIDISM, UNSPECIFIED TYPE: ICD-10-CM

## 2024-01-26 DIAGNOSIS — L30.9 DERMATITIS: ICD-10-CM

## 2024-01-26 DIAGNOSIS — I10 HYPERTENSION, UNSPECIFIED TYPE: ICD-10-CM

## 2024-01-26 PROBLEM — Z86.69 HISTORY OF MIGRAINE: Status: ACTIVE | Noted: 2024-01-26

## 2024-01-26 PROBLEM — M20.20 ACQUIRED HALLUX RIGIDUS: Status: ACTIVE | Noted: 2024-01-26

## 2024-01-26 PROBLEM — N39.0 URINARY TRACT INFECTION: Status: ACTIVE | Noted: 2024-01-26

## 2024-01-26 PROBLEM — H25.10 NUCLEAR SENILE CATARACT: Status: ACTIVE | Noted: 2023-10-11

## 2024-01-26 PROBLEM — E11.9 DIABETES MELLITUS (MULTI): Status: ACTIVE | Noted: 2024-01-26

## 2024-01-26 PROBLEM — R11.0 NAUSEA: Status: ACTIVE | Noted: 2024-01-26

## 2024-01-26 PROBLEM — R05.9 COUGH: Status: ACTIVE | Noted: 2024-01-26

## 2024-01-26 PROBLEM — R91.1 SOLITARY PULMONARY NODULE: Status: ACTIVE | Noted: 2024-01-26

## 2024-01-26 PROBLEM — R19.7 DIARRHEA: Status: ACTIVE | Noted: 2024-01-26

## 2024-01-26 PROBLEM — Z86.39 HISTORY OF HYPOTHYROIDISM: Status: ACTIVE | Noted: 2024-01-26

## 2024-01-26 PROBLEM — M54.2 NECK PAIN: Status: ACTIVE | Noted: 2024-01-26

## 2024-01-26 PROBLEM — B37.9 CANDIDIASIS: Status: ACTIVE | Noted: 2024-01-26

## 2024-01-26 PROBLEM — M79.643 PAIN OF HAND: Status: ACTIVE | Noted: 2024-01-26

## 2024-01-26 PROBLEM — J20.9 ACUTE BRONCHITIS: Status: ACTIVE | Noted: 2024-01-26

## 2024-01-26 PROBLEM — J01.90 ACUTE SINUSITIS: Status: ACTIVE | Noted: 2024-01-26

## 2024-01-26 PROBLEM — E66.9 OBESITY WITH BODY MASS INDEX 30 OR GREATER: Status: ACTIVE | Noted: 2024-01-26

## 2024-01-26 PROBLEM — M22.2X9 PATELLOFEMORAL PAIN SYNDROME: Status: ACTIVE | Noted: 2023-10-11

## 2024-01-26 PROCEDURE — 99214 OFFICE O/P EST MOD 30 MIN: CPT | Performed by: INTERNAL MEDICINE

## 2024-01-26 PROCEDURE — 3075F SYST BP GE 130 - 139MM HG: CPT | Performed by: INTERNAL MEDICINE

## 2024-01-26 PROCEDURE — 1036F TOBACCO NON-USER: CPT | Performed by: INTERNAL MEDICINE

## 2024-01-26 PROCEDURE — 3078F DIAST BP <80 MM HG: CPT | Performed by: INTERNAL MEDICINE

## 2024-01-26 RX ORDER — HYDROCHLOROTHIAZIDE 25 MG/1
25 TABLET ORAL DAILY
Qty: 30 TABLET | Refills: 0 | Status: SHIPPED | OUTPATIENT
Start: 2024-01-26 | End: 2024-02-15 | Stop reason: SDUPTHER

## 2024-01-26 RX ORDER — LEVOTHYROXINE SODIUM 125 UG/1
125 TABLET ORAL DAILY
Qty: 90 TABLET | Refills: 1 | Status: SHIPPED | OUTPATIENT
Start: 2024-01-26 | End: 2024-01-27 | Stop reason: SDUPTHER

## 2024-01-26 RX ORDER — ALPRAZOLAM 0.5 MG/1
0.5 TABLET ORAL DAILY PRN
Qty: 20 TABLET | Refills: 0 | Status: SHIPPED | OUTPATIENT
Start: 2024-01-26 | End: 2024-09-22

## 2024-01-26 RX ORDER — POTASSIUM CHLORIDE 20 MEQ/1
TABLET, EXTENDED RELEASE ORAL
Qty: 90 TABLET | Refills: 1 | Status: SHIPPED | OUTPATIENT
Start: 2024-01-26 | End: 2024-01-27 | Stop reason: SDUPTHER

## 2024-01-26 RX ORDER — HYDROCHLOROTHIAZIDE 25 MG/1
25 TABLET ORAL DAILY
Qty: 90 TABLET | Refills: 1 | Status: SHIPPED | OUTPATIENT
Start: 2024-01-26 | End: 2024-07-24

## 2024-01-26 RX ORDER — BUPROPION HYDROCHLORIDE 300 MG/1
300 TABLET ORAL EVERY MORNING
Qty: 30 TABLET | Refills: 5 | Status: SHIPPED | OUTPATIENT
Start: 2024-01-26

## 2024-01-26 ASSESSMENT — ENCOUNTER SYMPTOMS
LOSS OF SENSATION IN FEET: 0
OCCASIONAL FEELINGS OF UNSTEADINESS: 0
DEPRESSION: 0

## 2024-01-27 NOTE — PROGRESS NOTES
"Subjective   Patient ID: Alexis Yap is a 63 y.o. female who presents for multiple medical issues.    Alexis today came here for multiple medical issues.  1. She has severe right hip pain, she is going for a surgery.  2. Follow-up on blood work and other conditions.  She has a flight to catch, gets anxiety.  She came here for follow-up.    I have personally reviewed the patient's Past Medical History, Medications, Allergies, Social History, and Family History in the EMR.    Med Refill    Review of Systems   All other systems reviewed and are negative.    Objective   /72   Ht 1.676 m (5' 6\")   Wt 111 kg (244 lb)   BMI 39.38 kg/m²     Physical Exam  Vitals reviewed.   Cardiovascular:      Heart sounds: Normal heart sounds, S1 normal and S2 normal. No murmur heard.     No friction rub.   Pulmonary:      Effort: Pulmonary effort is normal.      Breath sounds: Normal breath sounds and air entry.   Abdominal:      Palpations: There is no hepatomegaly, splenomegaly or mass.   Musculoskeletal:      Right lower leg: No edema.      Left lower leg: No edema.   Lymphadenopathy:      Lower Body: No right inguinal adenopathy. No left inguinal adenopathy.   Neurological:      Cranial Nerves: Cranial nerves 2-12 are intact.      Sensory: No sensory deficit.      Motor: Motor function is intact.      Deep Tendon Reflexes: Reflexes are normal and symmetric.     LAB WORK:  Laboratory testing discussed.    Assessment/Plan   Problem List Items Addressed This Visit             ICD-10-CM       Cardiac and Vasculature    Hypercholesteremia E78.00    Relevant Orders    Lipid Panel    HTN (hypertension) I10    Relevant Medications    hydroCHLOROthiazide (HYDRODiuril) 25 mg tablet    hydroCHLOROthiazide (HYDRODiuril) 25 mg tablet    Other Relevant Orders    CBC    Comprehensive Metabolic Panel    Urinalysis with Reflex Microscopic    Thyroid Stimulating Hormone       Endocrine/Metabolic    Hypothyroidism E03.9    Relevant " Medications    levothyroxine (Synthroid, Levoxyl) 125 mcg tablet       Mental Health    Anxiety F41.9    Relevant Medications    ALPRAZolam (Xanax) 0.5 mg tablet    buPROPion XL (Wellbutrin XL) 300 mg 24 hr tablet     Other Visit Diagnoses         Codes    Right hip pain    -  Primary M25.551    Dermatitis     L30.9    Relevant Medications    potassium chloride CR (Klor-Con M20) 20 mEq ER tablet        1. Hypertension, stable.  Kidney okay.  2. Cholesterol.  Monitor.  3. Thyroid.  Keep an eye.  4. Hip pain surgery.  She is going for surgery.  She is in mild-to-moderate risk.  5. Anxiety situational.  Xanax given.  6. Blood work ordered.  Medication given.  7. I urged her to see me in about three to four weeks after operation.  Please do blood work.    Scribe Attestation  By signing my name below, IRosie, Rivas attest that this documentation has been prepared under the direction and in the presence of Elier Kruger MD.

## 2024-01-28 RX ORDER — LEVOTHYROXINE SODIUM 125 UG/1
125 TABLET ORAL DAILY
Qty: 90 TABLET | Refills: 1 | Status: SHIPPED | OUTPATIENT
Start: 2024-01-28

## 2024-01-28 RX ORDER — POTASSIUM CHLORIDE 20 MEQ/1
TABLET, EXTENDED RELEASE ORAL
Qty: 90 TABLET | Refills: 1 | Status: SHIPPED | OUTPATIENT
Start: 2024-01-28

## 2024-01-30 ENCOUNTER — APPOINTMENT (OUTPATIENT)
Dept: ORTHOPEDIC SURGERY | Facility: CLINIC | Age: 64
End: 2024-01-30
Payer: COMMERCIAL

## 2024-02-06 ENCOUNTER — APPOINTMENT (OUTPATIENT)
Dept: ORTHOPEDIC SURGERY | Facility: CLINIC | Age: 64
End: 2024-02-06
Payer: COMMERCIAL

## 2024-02-12 NOTE — PROGRESS NOTES
Blanca Morel MD    Adult Reconstruction and Joint Replacement Surgery   Phone: [phone]     Fax: [phone]         PREOPERATIVE CONSULTATION         Date of Visit:  2/19/2024      CC: Right hip pain      HPI:   Alexis Yap is a 63 y.o. female who presents for discussion of upcoming right total HIP arthroplasty. The patient reports 10 years of RIGHT hip pain. The patient has tried at least 3 months of conservative treatments, including Ice, NSAIDs, Activity modification, Physical therapy, and Xray, and continues to have disabling pain, impaired activities of daily living and worsened quality of life. Date of last steroid injection: none. They rate their pain as up to 10/10.      Patient does not have pain at night. Patient is able to walk 2-3 blocks. Patient is currently using nothing as assistive device. Primarily complains of groin and buttock pain. Patient has difficulty with donning and doffing shoes and socks, stairs, and getting in/out of car . The pain is significantly impacting her ability to perform activities of daily living. Patient reports no longer able to do activities such as walk without pain.      Focused History   PMH: Reviewed and PE/DVT: None    PSH: Reviewed , Hip/Knee replacement: L ULYSSES 3 years ago, [facility], Hip/Knee surgery: L ULYSSES, Anesthesia complications: None, Spine surgery: None, Surgical infection: None, and Weight loss surgery: None    Meds: Reviewed, Current Anticoagulants: None, Weight loss medication: None, and Current Opioids: None    Allergies: Reviewed  and The patient reports no contraindications or allergies to cephalosporins, aspirin, NSAIDs or opioids, except as noted above.   FH: No family history of any bleeding or clotting disorders.   SHx: Reviewed, Current smoker: No, and EtOH intake weekly: 1 drink per week    Gnosticist: no      PROMs    None       Past Medical History:   Diagnosis Date    Acute sinusitis, unspecified 08/19/2013    Acute  sinusitis    Allergy status to unspecified drugs, medicaments and biological substances     History of allergy    Anxiety     Arthritis     Chronic bronchitis (CMS/HCC)     Depression     Encounter for other general examination     Podiatry visit, routine    GERD (gastroesophageal reflux disease)     Hallux rigidus, unspecified foot     Hallux rigidus    Hyperlipidemia     Hypertension     Hypothyroidism     Irritable bowel syndrome     Other gastritis without bleeding 08/11/2016    Bile reflux gastritis    Other specified anxiety disorders     Depression with anxiety    Pain in left ankle and joints of left foot     Ankle pain, left    Personal history of other diseases of the musculoskeletal system and connective tissue 08/19/2013    History of backache    Personal history of other diseases of the musculoskeletal system and connective tissue     History of tendinitis    Personal history of other diseases of the nervous system and sense organs     History of migraine headaches    Personal history of other diseases of the respiratory system 08/19/2013    History of acute bronchitis    Personal history of other endocrine, nutritional and metabolic disease 08/19/2013    History of hypothyroidism    Personal history of other specified conditions     History of urinary frequency         Past Medical History:   Diagnosis Date    Acute sinusitis, unspecified 08/19/2013    Acute sinusitis    Allergy status to unspecified drugs, medicaments and biological substances     History of allergy    Anxiety     Arthritis     Chronic bronchitis (CMS/HCC)     Depression     Encounter for other general examination     Podiatry visit, routine    GERD (gastroesophageal reflux disease)     Hallux rigidus, unspecified foot     Hallux rigidus    Hyperlipidemia     Hypertension     Hypothyroidism     Irritable bowel syndrome     Other gastritis without bleeding 08/11/2016    Bile reflux gastritis    Other specified anxiety disorders      Depression with anxiety    Pain in left ankle and joints of left foot     Ankle pain, left    Personal history of other diseases of the musculoskeletal system and connective tissue 08/19/2013    History of backache    Personal history of other diseases of the musculoskeletal system and connective tissue     History of tendinitis    Personal history of other diseases of the nervous system and sense organs     History of migraine headaches    Personal history of other diseases of the respiratory system 08/19/2013    History of acute bronchitis    Personal history of other endocrine, nutritional and metabolic disease 08/19/2013    History of hypothyroidism    Personal history of other specified conditions     History of urinary frequency      Documented in chart and reviewed.       Past Surgical History:   Procedure Laterality Date    BLADDER SURGERY  09/20/2017    Bladder Surgery    BUNIONECTOMY Right 09/20/2017    Simple Bunion Exostectomy ([facility] Procedure)    GALLBLADDER SURGERY  09/20/2017    Gallbladder Surgery    HEMORRHOID SURGERY  09/20/2017    Hemorrhoidectomy    HYSTERECTOMY  09/20/2017    Hysterectomy    JOINT REPLACEMENT Left     THR    OTHER SURGICAL HISTORY      rectal polyp cancer/ removed    TONSILLECTOMY  08/19/2013    Tonsillectomy         Allergies: She is allergic to butalbital-acetaminop-caf-cod, butalbital-acetaminophen-caff, codeine, and anaprox [naproxen sodium].       Medications:   Current Outpatient Medications   Medication Instructions    acetaminophen (Tylenol) 325 mg tablet 2 tablets, oral, Every 8 hours PRN    ALPRAZolam (XANAX) 0.5 mg, oral, Daily PRN    aspirin 81 mg EC tablet 1 tablet, oral, Daily    buPROPion XL (WELLBUTRIN XL) 300 mg, oral, Every morning, Do not crush, chew, or split.    chlorhexidine (Peridex) 0.12 % solution 15 mL, Mouth/Throat, As needed    cholecalciferol (VITAMIN D-3) 125 mcg, oral, Daily    glucosamine-chondroitin 500-400 mg tablet 2 tablets, oral, Daily     hydroCHLOROthiazide (HYDRODIURIL) 25 mg, oral, Daily    ibuprofen 800 mg, oral, 3 times daily PRN    levothyroxine (SYNTHROID, LEVOXYL) 125 mcg, oral, Daily    magnesium oxide (MAG-OX) 400 mg, Daily    metoprolol tartrate (Lopressor) 50 mg tablet TAKE 1 TABLET TWICE A DAY (NEED APPOINTMENT, LAST FILL)    naproxen (NAPROSYN) 500 mg, oral, 2 times daily PRN    potassium chloride CR (Klor-Con M20) 20 mEq ER tablet TAKE 1 TABLET DAILY    rosuvastatin (Crestor) 10 mg tablet 1 tablet, oral, Every other day, Takes at night    saccharomyces boulardii (FLORASTOR) 250 mg, oral, Daily    TURMERIC ORAL 1 capsule, oral, Daily         Family History   Problem Relation Name Age of Onset    Colon cancer Mother        Documented in chart and reviewed.       Social History     Tobacco Use    Smoking status: Never    Smokeless tobacco: Never   Substance Use Topics    Alcohol use: Yes     Alcohol/week: 1.0 standard drink of alcohol     Types: 1 Glasses of wine per week     Comment: occ wine every few weeks         Review of Systems: Review of systems completed with medical assistant intake. Please refer to this note.       Falls: The patient denies any recent falls or fall-related injuries.      Physical Exam:   BMI: Body mass index is 40.1 kg/m²., which is abnormal. Encouraged to lose weight and to follow up with PCP.      Constitutional: The patient is well-appearing and well groomed.       Neurological/Psychiatric: The patient is alert and oriented to person, place and time. The patient has a normal mood and affect.      Skin Examination: The skin over the right lower extremity, left lower extremity, right upper extremity, and left upper extremity is intact without any evidence of infection or rash.      Cardiovascular Examination: There are no varicosities and the skin is normal temperature, capillary refill normal, arterial pulses normal, no edema.      Lymphatic Examination: There is no lymphatic swelling or palpable lymph nodes  present around the affected joint.      Neurological Examination: Bilateral lower extremities are grossly neurologically intact. Sensation normal, motor function normal.      Gait: The patient ambulates with a coxalgic gait.       Lumbar spine:      No tenderness to palpation midline.      Negative straight leg raise bilaterally.      Right Hip Examination:   Gait: Coxalgic gait.      Examination of the hip reveals the skin to be intact.      There is mild tenderness over the greater trochanter.      There is no obvious swelling.      There is a positive Stinchfield test.      Range of motion is: full extension to 80 degrees of flexion.      The hip internally rotates to 10 short of neutral degrees  and externally rotates to 20 degrees.      Abduction is 25 degrees and adduction is 10 degrees.      There is groin and buttock pain with hip motion.      There is a negative straight leg raise.      Left Hip Examination:   Examination of the hip reveals the skin to be intact.  Healed anterior approach incision.     There is no tenderness over the greater trochanter.      There is no obvious swelling.      There is a negative Stinchfield test.      Range of motion is full extension to 100 degrees of flexion.      The hip internally rotates to 20 and externally rotates 40 degrees.      Abduction is 50 degrees and adduction is 20 degrees.      There is no groin and buttock pain with hip motion.      There is a negative straight leg raise.      Right Knee Examination:   Examination of the right knee reveals the skin to be intact. There is no obvious swelling.      There is no tenderness to palpation.      Range of motion is full extension to 120 degrees of flexion.      The knee is stable.      There is no grinding with range of motion.      There is no patellofemoral crepitus.      Left Knee Examination:   Examination of the left knee reveals the skin to be intact. There is no obvious swelling.      There is no tenderness  to palpation.      Range of motion is full extension to 120 degrees of flexion.      The knee is stable.      There is no grinding with range of motion.      There is no patellofemoral crepitus.      Radiographs:   Radiographs were personally reviewed today with the patient. There is evidence of severe RIGHT  hip osteoarthritis with bone on bone apposition.  Cam and pincer lesion present.     There is an uncemented left total hip arthroplasty in place.  Old trochanteric avulsion fracture.  Increased inclination angle of the acetabular component.  No gross evidence of implant failure or loosening.     Impression:   63 y.o. year old female presents with severe RIGHT  hip osteoarthritis with bone on bone apposition. This is now affecting activities of daily living. All appropriate nonsurgical management options have been tried and failed.      Diagnosis:    Primary osteoarthritis of right hip      Recommendations / Plan:      I have discussed the options in detail with the patient. We have discussed anti-inflammatory medication, activity modification, physical therapy, corticosteroid injections, and total hip replacement surgery.      The risks and benefits of all these treatment options have been discussed in detail. The patient has tried at least 3 months of the above conservative treatments and continues to have disabling pain, impaired activities of daily living and worsened quality of life. The patient is a candidate for RIGHT  total hip replacement. We discussed anterior and posterolateral surgical approaches to the hip joint with my rationale for posterior approach.  Posterior approach is preferred in her given her size which can make it challenging to position her components and higher likelihood of wound complication (which she did have on the left side with an anterior approach) due to overlying soft tissue. Risks and benefits of all approaches were reviewed. We discussed expected rehabilitation, DVT  prophylaxis using Aspirin, time points during recovery, likely functional outcomes and long-term issues with hip replacement procedures.      We discussed the hospital stay, postoperative rehab and follow up required as well as the potential risks of surgery including bleeding, infection, need for reoperation, failure of the operation to provide symptomatic relief, leg length discrepancy, need for multiple revision surgeries in the setting of bleeding, wear, infection, instability, dislocation requiring closed and/or open reduction and/or revision, damage to major neurovascular structures, venous thrombolic disease, complications of regional and/or general anesthesia, as well as death. The patient also understands that a good result is not guaranteed and that poor outcomes can at times be unavoidable. The patient may also have persistent and chronic pain that could require further intervention. The patient confirms their understanding of the risks as well as the benefits of surgery.       We have reviewed the typical recovery after surgery and have discussed the fact that full recovery can take up to 1 year. Bearing surfaces were discussed in detail. The risks and benefits of all available bearing surfaces: metal on poly, Oxinium on poly, and dual mobility were discussed. Specifically, the risks of long-term wear and osteolysis were discussed. We also discussed the potential for metal hypersensitivity reactions that could potentially require further surgery.       The patient does not have any of the following contraindications to arthroplasty including active infection of the hip joint, systemic bacteremia, active skin infection or an open wound at the surgical site, neuropathic arthritis or severe, rapidly progressive neurological disease.       Patient will consider proceeding with RIGHT  ULYSSES. All questions were answered today. If the patient chooses to move forward with surgical scheduling, they will be enrolled  in a preoperative arthroplasty teaching class and the pre-admission testing pathway. The patient was encouraged to contact their primary care physician to discuss fitness for surgery.       Social support after surgery: Family members  Pain medication plan: Standard  Additional preoperative considerations: None     Diagnosis: M16.11  Procedure: 96613 -posterior approach  Surgery Location: Any  Equipment Requests: Depuy, S-ROM available on backup  Discharge: Same-day discharge       She plans to have son come to home for PT.  _____________   Blanca Morel MD    [facility]       Approximately?45?minutes were spent on the following tasks:   ????????????Preparing for the patient   ????????????Reviewing medical records   ????????????Taking a patient history   ????????????Performing a physical exam   ????????????Reviewing treatment options with the patient   ????????????Explaining the risks, potential benefits, and alternative to surgery   Explaining the expected rehabilitation after each treatment option   Explaining the potential long term expectations   Evaluating the diagnostic imaging    Templating pre-operative or current imaging   Performing surgical planning and booking   ?   This office note was transcribed with dictation software. ?Please excuse any typographical errors, program misunderstandings leading to inadvertent insertions or deletions of inappropriate wording, pronoun errors and other unintentional transcription errors not noticed on proof-reading.

## 2024-02-12 NOTE — PROGRESS NOTES
" Blanca Morel MD   Adult Reconstruction and Joint Replacement Surgery  Phone: [phone]     Fax: [phone]     HIP REPLACEMENT POSTOPERATIVE VISIT      Name: Alexis Yap  : 1960  Date of Visit: 24    Date of Surgery: 24    Procedure: Left posterior total hip replacement  Diagnosis: Left hip arthritis     Chief Complaint: Right hip replacement surgery follow-up    History of Present Illness:  The patient is now 13 days status post right posterior total hip replacement surgery.    The patient is doing home physical therapy and is progressing well.    Denies fevers or drainage from the incision.    Patient is walking with cane and walker for assistive device.    The patient has no fevers or drainage from the incision.    The patient is currently taking advil for pain.     The patient is currently taking aspirin for DVT prophylaxis.    There are no concerns.    Physical Exam:    The patient is well appearing, alert and oriented to person, place and time.    The incision is well healed.  There is no sign of wound complication.    There is no tenderness over the greater trochanter.    Range of motion is excellent and strength is improving.     There is no instability of the joint.    Homans sign is negative.    Neurologic and vascular examinations of the distal extremity are normal.     PROMs   24   \"HOOS, JR. Score\" 49.86     Imaging:    Radiographs were personally reviewed today. The implants are in good position with no evidence of complication. There have been no significant interval changes.    Impression and Plan:  63 y.o. female 13 days status post right posterior total hip replacement surgery.     The patient is doing well following total hip replacement surgery.  Antibiotic prophylaxis prior to dental procedures were reviewed.  Hip precautions were reviewed.  Long-term failure mechanisms were reviewed.  The patient was asked to contact the office sooner if there are any " concerns. A new physical therapy prescription was given and exercises reviewed with the patient in the office. Their questions were answered.     RTC: 6 weeks      X-rays at next visit: Postop ULYSSES series     _____________________  Blanca Morel MD   [facility]

## 2024-02-13 ENCOUNTER — APPOINTMENT (OUTPATIENT)
Dept: ORTHOPEDIC SURGERY | Facility: CLINIC | Age: 64
End: 2024-02-13
Payer: COMMERCIAL

## 2024-02-14 ENCOUNTER — APPOINTMENT (OUTPATIENT)
Dept: PREADMISSION TESTING | Facility: HOSPITAL | Age: 64
End: 2024-02-14
Payer: COMMERCIAL

## 2024-02-15 ENCOUNTER — PRE-ADMISSION TESTING (OUTPATIENT)
Dept: PREADMISSION TESTING | Facility: HOSPITAL | Age: 64
End: 2024-02-15
Payer: COMMERCIAL

## 2024-02-15 VITALS
HEIGHT: 66 IN | BODY MASS INDEX: 38.87 KG/M2 | DIASTOLIC BLOOD PRESSURE: 82 MMHG | HEART RATE: 65 BPM | TEMPERATURE: 98.6 F | RESPIRATION RATE: 16 BRPM | SYSTOLIC BLOOD PRESSURE: 160 MMHG | WEIGHT: 241.84 LBS | OXYGEN SATURATION: 97 %

## 2024-02-15 DIAGNOSIS — E78.00 HYPERCHOLESTEREMIA: ICD-10-CM

## 2024-02-15 DIAGNOSIS — E03.9 HYPOTHYROIDISM, UNSPECIFIED TYPE: ICD-10-CM

## 2024-02-15 DIAGNOSIS — Z01.818 PREOPERATIVE TESTING: Primary | ICD-10-CM

## 2024-02-15 DIAGNOSIS — E13.69 OTHER SPECIFIED DIABETES MELLITUS WITH OTHER SPECIFIED COMPLICATION, UNSPECIFIED WHETHER LONG TERM INSULIN USE (MULTI): ICD-10-CM

## 2024-02-15 DIAGNOSIS — I10 HYPERTENSION, UNSPECIFIED TYPE: ICD-10-CM

## 2024-02-15 DIAGNOSIS — M16.11 UNILATERAL PRIMARY OSTEOARTHRITIS, RIGHT HIP: ICD-10-CM

## 2024-02-15 LAB
ABO GROUP (TYPE) IN BLOOD: NORMAL
ALBUMIN SERPL BCP-MCNC: 4.7 G/DL (ref 3.4–5)
ALP SERPL-CCNC: 81 U/L (ref 33–136)
ALT SERPL W P-5'-P-CCNC: 19 U/L (ref 7–45)
ANION GAP SERPL CALC-SCNC: 13 MMOL/L (ref 10–20)
ANTIBODY SCREEN: NORMAL
APPEARANCE UR: CLEAR
AST SERPL W P-5'-P-CCNC: 21 U/L (ref 9–39)
BASOPHILS # BLD AUTO: 0.02 X10*3/UL (ref 0–0.1)
BASOPHILS NFR BLD AUTO: 0.3 %
BILIRUB SERPL-MCNC: 0.5 MG/DL (ref 0–1.2)
BILIRUB UR STRIP.AUTO-MCNC: NEGATIVE MG/DL
BUN SERPL-MCNC: 23 MG/DL (ref 6–23)
CALCIUM SERPL-MCNC: 9.8 MG/DL (ref 8.6–10.3)
CHLORIDE SERPL-SCNC: 100 MMOL/L (ref 98–107)
CHOLEST SERPL-MCNC: 163 MG/DL (ref 133–200)
CHOLEST/HDLC SERPL: 3.6 {RATIO}
CO2 SERPL-SCNC: 29 MMOL/L (ref 21–32)
COLOR UR: YELLOW
CREAT SERPL-MCNC: 0.7 MG/DL (ref 0.5–1.05)
EGFRCR SERPLBLD CKD-EPI 2021: >90 ML/MIN/1.73M*2
EOSINOPHIL # BLD AUTO: 0.09 X10*3/UL (ref 0–0.7)
EOSINOPHIL NFR BLD AUTO: 1.3 %
ERYTHROCYTE [DISTWIDTH] IN BLOOD BY AUTOMATED COUNT: 12.2 % (ref 11.5–14.5)
EST. AVERAGE GLUCOSE BLD GHB EST-MCNC: 151 MG/DL
GLUCOSE SERPL-MCNC: 99 MG/DL (ref 74–99)
GLUCOSE UR STRIP.AUTO-MCNC: NEGATIVE MG/DL
HBA1C MFR BLD: 6.9 %
HCT VFR BLD AUTO: 42.8 % (ref 36–46)
HDLC SERPL-MCNC: 45 MG/DL
HGB BLD-MCNC: 14.7 G/DL (ref 12–16)
IMM GRANULOCYTES # BLD AUTO: 0.01 X10*3/UL (ref 0–0.7)
IMM GRANULOCYTES NFR BLD AUTO: 0.1 % (ref 0–0.9)
INR PPP: 0.9 (ref 0.9–1.2)
KETONES UR STRIP.AUTO-MCNC: NEGATIVE MG/DL
LDLC SERPL CALC-MCNC: 70 MG/DL (ref 65–130)
LEUKOCYTE ESTERASE UR QL STRIP.AUTO: ABNORMAL
LYMPHOCYTES # BLD AUTO: 2.03 X10*3/UL (ref 1.2–4.8)
LYMPHOCYTES NFR BLD AUTO: 29.8 %
MCH RBC QN AUTO: 30.6 PG (ref 26–34)
MCHC RBC AUTO-ENTMCNC: 34.3 G/DL (ref 32–36)
MCV RBC AUTO: 89 FL (ref 80–100)
MONOCYTES # BLD AUTO: 0.67 X10*3/UL (ref 0.1–1)
MONOCYTES NFR BLD AUTO: 9.8 %
NEUTROPHILS # BLD AUTO: 3.99 X10*3/UL (ref 1.2–7.7)
NEUTROPHILS NFR BLD AUTO: 58.7 %
NITRITE UR QL STRIP.AUTO: NEGATIVE
NRBC BLD-RTO: NORMAL /100{WBCS}
PH UR STRIP.AUTO: 6 [PH]
PLATELET # BLD AUTO: 282 X10*3/UL (ref 150–450)
POTASSIUM SERPL-SCNC: 3.9 MMOL/L (ref 3.5–5.3)
PROT SERPL-MCNC: 7.2 G/DL (ref 6.4–8.2)
PROT UR STRIP.AUTO-MCNC: NEGATIVE MG/DL
PROTHROMBIN TIME: 9.2 SECONDS (ref 9.3–12.7)
RBC # BLD AUTO: 4.8 X10*6/UL (ref 4–5.2)
RBC # UR STRIP.AUTO: NEGATIVE /UL
RBC #/AREA URNS AUTO: ABNORMAL /HPF
RH FACTOR (ANTIGEN D): NORMAL
SODIUM SERPL-SCNC: 138 MMOL/L (ref 136–145)
SP GR UR STRIP.AUTO: 1.02
SQUAMOUS #/AREA URNS AUTO: ABNORMAL /HPF
TRIGL SERPL-MCNC: 242 MG/DL (ref 40–150)
TSH SERPL DL<=0.05 MIU/L-ACNC: 2.33 MIU/L (ref 0.27–4.2)
UROBILINOGEN UR STRIP.AUTO-MCNC: 0.2 MG/DL
WBC # BLD AUTO: 6.8 X10*3/UL (ref 4.4–11.3)
WBC #/AREA URNS AUTO: ABNORMAL /HPF

## 2024-02-15 PROCEDURE — 86900 BLOOD TYPING SEROLOGIC ABO: CPT

## 2024-02-15 PROCEDURE — 85610 PROTHROMBIN TIME: CPT

## 2024-02-15 PROCEDURE — 86850 RBC ANTIBODY SCREEN: CPT

## 2024-02-15 PROCEDURE — 85025 COMPLETE CBC W/AUTO DIFF WBC: CPT

## 2024-02-15 PROCEDURE — 93010 ELECTROCARDIOGRAM REPORT: CPT | Performed by: INTERNAL MEDICINE

## 2024-02-15 PROCEDURE — 84443 ASSAY THYROID STIM HORMONE: CPT

## 2024-02-15 PROCEDURE — 80061 LIPID PANEL: CPT

## 2024-02-15 PROCEDURE — 87081 CULTURE SCREEN ONLY: CPT

## 2024-02-15 PROCEDURE — 86901 BLOOD TYPING SEROLOGIC RH(D): CPT

## 2024-02-15 PROCEDURE — 83036 HEMOGLOBIN GLYCOSYLATED A1C: CPT

## 2024-02-15 PROCEDURE — 80053 COMPREHEN METABOLIC PANEL: CPT

## 2024-02-15 PROCEDURE — 81001 URINALYSIS AUTO W/SCOPE: CPT

## 2024-02-15 PROCEDURE — 93005 ELECTROCARDIOGRAM TRACING: CPT

## 2024-02-15 PROCEDURE — 36415 COLL VENOUS BLD VENIPUNCTURE: CPT

## 2024-02-15 PROCEDURE — 99204 OFFICE O/P NEW MOD 45 MIN: CPT | Performed by: NURSE PRACTITIONER

## 2024-02-15 RX ORDER — CHLORHEXIDINE GLUCONATE ORAL RINSE 1.2 MG/ML
15 SOLUTION DENTAL AS NEEDED
Qty: 30 ML | Refills: 0 | Status: ON HOLD | OUTPATIENT
Start: 2024-02-15 | End: 2024-02-27 | Stop reason: ALTCHOICE

## 2024-02-15 RX ORDER — BUTYROSPERMUM PARKII(SHEA BUTTER), SIMMONDSIA CHINENSIS (JOJOBA) SEED OIL, ALOE BARBADENSIS LEAF EXTRACT .01; 1; 3.5 G/100G; G/100G; G/100G
250 LIQUID TOPICAL DAILY
COMMUNITY

## 2024-02-15 RX ORDER — NAPROXEN 250 MG/1
500 TABLET ORAL 2 TIMES DAILY PRN
COMMUNITY
End: 2024-02-28 | Stop reason: HOSPADM

## 2024-02-15 ASSESSMENT — PAIN - FUNCTIONAL ASSESSMENT: PAIN_FUNCTIONAL_ASSESSMENT: 0-10

## 2024-02-15 ASSESSMENT — PAIN DESCRIPTION - DESCRIPTORS: DESCRIPTORS: ACHING;SHARP

## 2024-02-15 ASSESSMENT — PAIN SCALES - GENERAL: PAINLEVEL_OUTOF10: 7

## 2024-02-15 NOTE — PREPROCEDURE INSTRUCTIONS
Medication List            Accurate as of February 15, 2024  1:27 PM. Always use your most recent med list.                acetaminophen 325 mg tablet  Commonly known as: Tylenol  Medication Adjustments for Surgery: Take morning of surgery with sip of water, no other fluids  Notes to patient: As needed     ALPRAZolam 0.5 mg tablet  Commonly known as: Xanax  Take 1 tablet (0.5 mg) by mouth once daily as needed for anxiety.  Medication Adjustments for Surgery: Take morning of surgery with sip of water, no other fluids  Notes to patient: Take as needed     aspirin 81 mg EC tablet  Medication Adjustments for Surgery: Stop 7 days before surgery     buPROPion  mg 24 hr tablet  Commonly known as: Wellbutrin XL  Take 1 tablet (300 mg) by mouth once daily in the morning. Do not crush, chew, or split.  Medication Adjustments for Surgery: Take morning of surgery with sip of water, no other fluids     chlorhexidine 0.12 % solution  Commonly known as: Peridex  Use 15 mL in the mouth or throat if needed (pre operative 15ml swish and spit the night befor and morning of surgery) for up to 2 doses.  Notes to patient: Use night before and morning of surgery     cholecalciferol 125 MCG (5000 UT) capsule  Commonly known as: Vitamin D-3  Medication Adjustments for Surgery: Stop 7 days before surgery     glucosamine-chondroitin 500-400 mg tablet  Medication Adjustments for Surgery: Stop 7 days before surgery     hydroCHLOROthiazide 25 mg tablet  Commonly known as: HYDRODiuril  Take 1 tablet (25 mg) by mouth once daily.  Medication Adjustments for Surgery: Take morning of surgery with sip of water, no other fluids     ibuprofen 800 mg tablet  Medication Adjustments for Surgery: Stop 7 days before surgery     levothyroxine 125 mcg tablet  Commonly known as: Synthroid, Levoxyl  Take 1 tablet (125 mcg) by mouth once daily.  Medication Adjustments for Surgery: Take morning of surgery with sip of water, no other fluids     magnesium  oxide 400 mg tablet  Commonly known as: Mag-Ox  Medication Adjustments for Surgery: Stop 7 days before surgery     metoprolol tartrate 50 mg tablet  Commonly known as: Lopressor  TAKE 1 TABLET TWICE A DAY (NEED APPOINTMENT, LAST FILL)  Medication Adjustments for Surgery: Take morning of surgery with sip of water, no other fluids     naproxen 250 mg tablet  Commonly known as: Naprosyn  Medication Adjustments for Surgery: Stop 7 days before surgery     potassium chloride CR 20 mEq ER tablet  Commonly known as: Klor-Con M20  TAKE 1 TABLET DAILY  Medication Adjustments for Surgery: Take morning of surgery with sip of water, no other fluids     rosuvastatin 10 mg tablet  Commonly known as: Crestor  Notes to patient: Take night before if it's the night usually take (takes every other day)     saccharomyces boulardii 250 mg capsule  Commonly known as: Florastor  Medication Adjustments for Surgery: Stop 7 days before surgery     TURMERIC ORAL  Medication Adjustments for Surgery: Stop 7 days before surgery                              NPO Instructions:    Do not eat any food after midnight the night before your surgery/procedure.    Additional Instructions:     Seven/Six Days before Surgery:  Review your medication instructions, stop indicated medications  Five Days before Surgery:  Review your medication instructions, stop indicated medications  Three Days before Surgery:  Review your medication instructions, stop indicated medications  The Day before Surgery:  Review your medication instructions, stop indicated medications  You will be contacted regarding the time of your arrival to facility and surgery time  Do not eat any food after Midnight  Day of Surgery:  Review your medication instructions, take indicated medications  Wear  comfortable loose fitting clothing  Do not use moisturizers, creams, lotions or perfume  All jewelry and valuables should be left at home  PAT DISCHARGE INSTRUCTIONS    Please call the Same Day  Surgery (SDS) Department of the hospital where your procedure will be performed between 2:00- 3:30 PM the day before your surgery. If you are scheduled on a Monday, or a Tuesday following a Monday holiday, you will need to call on the last business day prior to your surgery.    [facility]  [address] [place] [address]  [phone]    Please let your surgeon know if:      You develop any open sores, shingles, burning or painful urination as these may increase your risk of an infection.   You no longer wish to have the surgery.   Any other personal circumstances change that may lead to the need to cancel or defer this surgery-such as being sick or getting admitted to any hospital within one week of your planned procedure.    Your contact details change, such as a change of address or phone number.    Starting now:     Please DO NOT drink alcohol or smoke for 24 hours before surgery. It is well known that quitting smoking can make a huge difference to your health and recovery from surgery. The longer you abstain from smoking, the better your chances of a healthy recovery. If you need help with quitting, call 1800-QUIT-NOW to be connected to a trained counselor who will discuss the best methods to help you quit.     Before your surgery:    Please stop all supplements 7 days prior to surgery. Or as directed by your surgeon.   Please stop taking NSAID pain medicine such as Advil and Motrin 7 days before surgery.    If you develop any fever, cough, cold, rashes, cuts, scratches, scrapes, urinary symptoms or infection anywhere on your body (including teeth and gums) prior to surgery, please call your surgeon’s office as soon as possible. This may require treatment to reduce the chance of cancellation on the day of surgery.    The day before your surgery:   DIET- Do not eat any food after MIDNIGHT.   Get a good night’s rest.  Use the special soap for bathing if you have been  instructed to use one.    Scheduled surgery times may change and you will be notified if this occurs - please check your personal voicemail for any updates.     On the morning of surgery:   Wear comfortable, loose fitting clothes which open in the front. Please do not wear moisturizers, creams, lotions, makeup or perfume.    Please bring with you to surgery:   Photo ID and insurance card   Current list of medicines and allergies   Pacemaker/ Defibrillator/Heart stent cards   CPAP machine and mask    Slings/ splints/ crutches   A copy of your complete advanced directive/DHPOA.    Please do NOT bring with you to surgery:   All jewelry and valuables should be left at home.   Prosthetic devices such as contact lenses, hearing aids, dentures, eyelash extensions, hairpins and body piercings must be removed prior to going in to the surgical suite.    After outpatient surgery:   A responsible adult MUST accompany you at the time of discharge and stay with you for 24 hours after your surgery. You may NOT drive yourself home after surgery.    Do not drive, operate machinery, make critical decisions or do activities that require co-ordination or balance until after a night’s sleep.   Do not drink alcoholic beverages for 24 hours.   Instructions for resuming your medications will be provided by your surgeon.    CALL YOUR DOCTOR AFTER SURGERY IF YOU HAVE:     Chills and/or a fever of 101° F or higher.    Redness, swelling, pus or drainage from your surgical wound or a bad smell from the wound.    Lightheadedness, fainting or confusion.    Persistent vomiting (throwing up) and are not able to eat or drink for 12 hours.    Three or more loose, watery bowel movements in 24 hours (diarrhea).   Difficulty or pain while urinating( after non-urological surgery)    Pain and swelling in your legs, especially if it is only on one side.    Difficulty breathing or are breathing faster than normal.    Any new concerning  symptoms.      Reviewed pre-op instructions with patient, states understanding and denies further questions at this time.    If you have not received a call regarding your arrival time for surgery by 2pm on the day before surgery, you can call [phone].    Take CarePAT DISCHARGE INSTRUCTIONS    Please call the Same Day Surgery (SDS) Department of the hospital where your procedure will be performed between 2:00- 3:30 PM the day before your surgery. If you are scheduled on a Monday, or a Tuesday following a Monday holiday, you will need to call on the last business day prior to your surgery.    [facility]  [address] [place] [address]  [phone]    Please let your surgeon know if:      You develop any open sores, shingles, burning or painful urination as these may increase your risk of an infection.   You no longer wish to have the surgery.   Any other personal circumstances change that may lead to the need to cancel or defer this surgery-such as being sick or getting admitted to any hospital within one week of your planned procedure.    Your contact details change, such as a change of address or phone number.    Starting now:     Please DO NOT drink alcohol or smoke for 24 hours before surgery. It is well known that quitting smoking can make a huge difference to your health and recovery from surgery. The longer you abstain from smoking, the better your chances of a healthy recovery. If you need help with quitting, call 3-331-QUIT-NOW to be connected to a trained counselor who will discuss the best methods to help you quit.     Before your surgery:    Please stop all supplements 7 days prior to surgery. Or as directed by your surgeon.   Please stop taking NSAID pain medicine such as Advil and Motrin 7 days before surgery.    If you develop any fever, cough, cold, rashes, cuts, scratches, scrapes, urinary symptoms or infection anywhere on your body (including teeth  and gums) prior to surgery, please call your surgeon’s office as soon as possible. This may require treatment to reduce the chance of cancellation on the day of surgery.    The day before your surgery:   DIET- Do not eat any food after MIDNIGHT.   Get a good night’s rest.  Use the special soap for bathing if you have been instructed to use one.    Scheduled surgery times may change and you will be notified if this occurs - please check your personal voicemail for any updates.     On the morning of surgery:   Wear comfortable, loose fitting clothes which open in the front. Please do not wear moisturizers, creams, lotions, makeup or perfume.    Please bring with you to surgery:   Photo ID and insurance card   Current list of medicines and allergies   Pacemaker/ Defibrillator/Heart stent cards   CPAP machine and mask    Slings/ splints/ crutches   A copy of your complete advanced directive/DHPOA.    Please do NOT bring with you to surgery:   All jewelry and valuables should be left at home.   Prosthetic devices such as contact lenses, hearing aids, dentures, eyelash extensions, hairpins and body piercings must be removed prior to going in to the surgical suite.    After outpatient surgery:   A responsible adult MUST accompany you at the time of discharge and stay with you for 24 hours after your surgery. You may NOT drive yourself home after surgery.    Do not drive, operate machinery, make critical decisions or do activities that require co-ordination or balance until after a night’s sleep.   Do not drink alcoholic beverages for 24 hours.   Instructions for resuming your medications will be provided by your surgeon.    CALL YOUR DOCTOR AFTER SURGERY IF YOU HAVE:     Chills and/or a fever of 101° F or higher.    Redness, swelling, pus or drainage from your surgical wound or a bad smell from the wound.    Lightheadedness, fainting or confusion.    Persistent vomiting (throwing up) and are not able to eat or drink for 12  hours.    Three or more loose, watery bowel movements in 24 hours (diarrhea).   Difficulty or pain while urinating( after non-urological surgery)    Pain and swelling in your legs, especially if it is only on one side.    Difficulty breathing or are breathing faster than normal.    Any new concerning symptoms.      Reviewed pre-op instructions with patient, states understanding and denies further questions at this time.    If you have not received a call regarding your arrival time for surgery by 2pm on the day before surgery, you can call [phone].    Take CarePAT DISCHARGE INSTRUCTIONS    Please call the Same Day Surgery (SDS) Department of the hospital where your procedure will be performed between 2:00- 3:30 PM the day before your surgery. If you are scheduled on a Monday, or a Tuesday following a Monday holiday, you will need to call on the last business day prior to your surgery.    [facility]  [address] [place] [address]  [phone]    Please let your surgeon know if:      You develop any open sores, shingles, burning or painful urination as these may increase your risk of an infection.   You no longer wish to have the surgery.   Any other personal circumstances change that may lead to the need to cancel or defer this surgery-such as being sick or getting admitted to any hospital within one week of your planned procedure.    Your contact details change, such as a change of address or phone number.    Starting now:     Please DO NOT drink alcohol or smoke for 24 hours before surgery. It is well known that quitting smoking can make a huge difference to your health and recovery from surgery. The longer you abstain from smoking, the better your chances of a healthy recovery. If you need help with quitting, call 6-800-QUIT-NOW to be connected to a trained counselor who will discuss the best methods to help you quit.     Before your surgery:    Please stop all  supplements 7 days prior to surgery. Or as directed by your surgeon.   Please stop taking NSAID pain medicine such as Advil and Motrin 7 days before surgery.    If you develop any fever, cough, cold, rashes, cuts, scratches, scrapes, urinary symptoms or infection anywhere on your body (including teeth and gums) prior to surgery, please call your surgeon’s office as soon as possible. This may require treatment to reduce the chance of cancellation on the day of surgery.    The day before your surgery:   DIET- Do not eat any food after MIDNIGHT.   Get a good night’s rest.  Use the special soap for bathing if you have been instructed to use one.    Scheduled surgery times may change and you will be notified if this occurs - please check your personal voicemail for any updates.     On the morning of surgery:   Wear comfortable, loose fitting clothes which open in the front. Please do not wear moisturizers, creams, lotions, makeup or perfume.    Please bring with you to surgery:   Photo ID and insurance card   Current list of medicines and allergies   Pacemaker/ Defibrillator/Heart stent cards   CPAP machine and mask    Slings/ splints/ crutches   A copy of your complete advanced directive/DHPOA.    Please do NOT bring with you to surgery:   All jewelry and valuables should be left at home.   Prosthetic devices such as contact lenses, hearing aids, dentures, eyelash extensions, hairpins and body piercings must be removed prior to going in to the surgical suite.    After outpatient surgery:   A responsible adult MUST accompany you at the time of discharge and stay with you for 24 hours after your surgery. You may NOT drive yourself home after surgery.    Do not drive, operate machinery, make critical decisions or do activities that require co-ordination or balance until after a night’s sleep.   Do not drink alcoholic beverages for 24 hours.   Instructions for resuming your medications will be provided by your  surgeon.    CALL YOUR DOCTOR AFTER SURGERY IF YOU HAVE:     Chills and/or a fever of 101° F or higher.    Redness, swelling, pus or drainage from your surgical wound or a bad smell from the wound.    Lightheadedness, fainting or confusion.    Persistent vomiting (throwing up) and are not able to eat or drink for 12 hours.    Three or more loose, watery bowel movements in 24 hours (diarrhea).   Difficulty or pain while urinating( after non-urological surgery)    Pain and swelling in your legs, especially if it is only on one side.    Difficulty breathing or are breathing faster than normal.    Any new concerning symptoms.      Reviewed pre-op instructions with patient, states understanding and denies further questions at this time.    If you have not received a call regarding your arrival time for surgery by 2pm on the day before surgery, you can call [phone].    Take CarePAT DISCHARGE INSTRUCTIONS    Please call the Same Day Surgery (SDS) Department of the hospital where your procedure will be performed between 2:00- 3:30 PM the day before your surgery. If you are scheduled on a Monday, or a Tuesday following a Monday holiday, you will need to call on the last business day prior to your surgery.    [facility]  [address] [place] [address]  [phone]    Please let your surgeon know if:      You develop any open sores, shingles, burning or painful urination as these may increase your risk of an infection.   You no longer wish to have the surgery.   Any other personal circumstances change that may lead to the need to cancel or defer this surgery-such as being sick or getting admitted to any hospital within one week of your planned procedure.    Your contact details change, such as a change of address or phone number.    Starting now:     Please DO NOT drink alcohol or smoke for 24 hours before surgery. It is well known that quitting smoking can make a huge  difference to your health and recovery from surgery. The longer you abstain from smoking, the better your chances of a healthy recovery. If you need help with quitting, call 1-800-QUIT-NOW to be connected to a trained counselor who will discuss the best methods to help you quit.     Before your surgery:    Please stop all supplements 7 days prior to surgery. Or as directed by your surgeon.   Please stop taking NSAID pain medicine such as Advil and Motrin 7 days before surgery.    If you develop any fever, cough, cold, rashes, cuts, scratches, scrapes, urinary symptoms or infection anywhere on your body (including teeth and gums) prior to surgery, please call your surgeon’s office as soon as possible. This may require treatment to reduce the chance of cancellation on the day of surgery.    The day before your surgery:   DIET- Do not eat any food after MIDNIGHT.   Get a good night’s rest.  Use the special soap for bathing if you have been instructed to use one.    Scheduled surgery times may change and you will be notified if this occurs - please check your personal voicemail for any updates.     On the morning of surgery:   Wear comfortable, loose fitting clothes which open in the front. Please do not wear moisturizers, creams, lotions, makeup or perfume.    Please bring with you to surgery:   Photo ID and insurance card   Current list of medicines and allergies   Pacemaker/ Defibrillator/Heart stent cards   CPAP machine and mask    Slings/ splints/ crutches   A copy of your complete advanced directive/DHPOA.    Please do NOT bring with you to surgery:   All jewelry and valuables should be left at home.   Prosthetic devices such as contact lenses, hearing aids, dentures, eyelash extensions, hairpins and body piercings must be removed prior to going in to the surgical suite.    After outpatient surgery:   A responsible adult MUST accompany you at the time of discharge and stay with you for 24 hours after your surgery.  You may NOT drive yourself home after surgery.    Do not drive, operate machinery, make critical decisions or do activities that require co-ordination or balance until after a night’s sleep.   Do not drink alcoholic beverages for 24 hours.   Instructions for resuming your medications will be provided by your surgeon.    CALL YOUR DOCTOR AFTER SURGERY IF YOU HAVE:     Chills and/or a fever of 101° F or higher.    Redness, swelling, pus or drainage from your surgical wound or a bad smell from the wound.    Lightheadedness, fainting or confusion.    Persistent vomiting (throwing up) and are not able to eat or drink for 12 hours.    Three or more loose, watery bowel movements in 24 hours (diarrhea).   Difficulty or pain while urinating( after non-urological surgery)    Pain and swelling in your legs, especially if it is only on one side.    Difficulty breathing or are breathing faster than normal.    Any new concerning symptoms.      Reviewed pre-op instructions with patient, states understanding and denies further questions at this time.    If you have not received a call regarding your arrival time for surgery by 2pm on the day before surgery, you can call [phone].    Take Care  Reviewed Pre-operative Instruction Sheet.   Nothing to eat  or drink after midnight. No candy, water, gum or mints.  For safety, patient must have responsible licensed  to take you home and stay with you for 24 hours.  Shower. Wear clean comfortable clothing. No lotions or body creams. Leave jewelry and valuables at home.  Notify surgeon of illness or in any changes to health.  Bring any medical equipment with you on day of surgery that surgeon has given.  Please review any medication instructions given prior to surgery.  No further questions at this time.   Home Preoperative Antibacterial Shower    What is a home preoperative antibacterial shower?  This shower is a way of cleaning the skin with a germ killing solution before  surgery. The solution contains chlorhexidine, commonly known as CHG. CHG is a skin cleanser with germ killing ability. Let your doctor know if you are allergic to chlorhexidine.      Why do I need to take a preoperative antibacterial shower?  Skin is not sterile. It is best to try to make your skin as free of germs as possible before surgery. Proper cleansing with a germ killing soap before surgery can lower the number of germs on your skin. This helps to reduce the risk of infection at the surgical site. Following the instructions listed below will help you prepare your skin for surgery.    How do I use the solution?      Steps: Begin using your CHG soap FIVE DAYS BEFORE your scheduled surgery on __________________________________________________.  First, wash and rinse your hair using the CHG soap. Keep CHG away soap away from ear canals and eyes.  Rinse completely, do not condition. Hair extensions should be removed.  Wash your face with your normal soap and rinse.  Apply the CHG solution to a clean wet washcloth. Turn the water off or move away from the water spray to avoid premature rinsing of the CHG soap as you are applying.  Firmly lather your entire body from neck down. Do not use on face.  Pay special attention to the areas(s) where your incision(s) will be located unless they are on your face.  Avoid scrubbing your skin too hard.  The important point is to have the CHG soap sit on your skin for 3 minutes.  DO NOT wash with regular soap after you have used the CHG soap solution.  Pat yourself dry with a clean, freshly laundered towel.  DO NOT apply powders, deodorants or lotions.  Dress in clean, freshly laundered night clothes.  Be sure to sleep with clean, freshly laundered sheets.  Be aware that CHG will cause stains on fabrics; if you wash them with bleach after use. Rinse your washcloth and other linens that have contact with CHG completely. Use only non-chlorine detergents to launder the items  used.  The morning of surgery is the fifth day. Repeat the above steps and dress in clean comfortable clothing.      Who should I call if I have any questions regarding the use of CHG soap?  Call the [facility] at [phone] if you have any questions.   Patient Information: Oral/Dental Rinse    What is oral/dental rinse?  It is a mouthwash. It is a way of cleaning the mouth with a germ killing solution before your surgery. The solution contains chlorhexidine, commonly know as CHG.  It is used inside the mouth to kill bacteria known as Staphylococcus aureus.  Let your doctor know if you are allergic to chlorhexidine.    Why do I need to use CHG oral/dental rinse?  The CHG oral/dental rinse helps to kill bacteria in your mouth known as Staphylococcus aureus. This reduces the risk of infection at the surgical site.    Using your CHG oral/dental rinse.  STEPS: use your CHG oral/dental rinse after you brush your teeth the night before (at bedtime) and the morning of your surgery. Follow the directions on your prescription label.  Use the cap on the container to measure 15ml (fill cap to fill line)  Swish (gargle if you can) the mouthwash in your mouth for at least 30 seconds, (do not swallow) spit out.  After you use your CHG rinse, do not rinse your mouth with water, drink or eat. Please refer to prescription label for the appropriate time to resume oral intake.    What side effects might I have using the CHG oral/dental rinse?  CHG rinse will stick to the plaque on the teeth. Brush and floss just before use. Teeth brushing will help avoid staining of plaque during use.    Who should I contact if I have questions about the CHG oral/dental rinse?  Please call [facility] at [phone] if you have any questions.

## 2024-02-15 NOTE — H&P (VIEW-ONLY)
CPM/PAT Evaluation   Alexis Yap is a 63 y.o. female   Chief Complaint: hip pain having a hip replacement    HPI:  Patient is a 62 y/o alert and oriented female coming in for PAT for a scheduled Arthroplasty Total Hip Posterior Approach on 2/27/24 w/ Dr. Morel.    The patient reports she has 7/10 sharp achy hip pain that is worse with walking and trying to get up from sitting.   She states sitting helps at times.  No recent injections or physical therapy.  Has concerns about the approach and advised to discuss with Dr. Morel.    Patient denies chest pain, SOB, DUMONT and NVDC.    Patient also denies Hx: DVT/PE.    Current medications were reviewed and a presurgical mediation schedule was provided.    She has no questions at this time.   Past Medical History:   Diagnosis Date    Acute sinusitis, unspecified 08/19/2013    Acute sinusitis    Allergy status to unspecified drugs, medicaments and biological substances     History of allergy    Chronic bronchitis (CMS/HCC)     Depression     Encounter for other general examination     Podiatry visit, routine    GERD (gastroesophageal reflux disease)     Hallux rigidus, unspecified foot     Hallux rigidus    Hypertension     Hypothyroidism     Irritable bowel syndrome     Other gastritis without bleeding 08/11/2016    Bile reflux gastritis    Other specified anxiety disorders     Depression with anxiety    Pain in left ankle and joints of left foot     Ankle pain, left    Personal history of other diseases of the musculoskeletal system and connective tissue 08/19/2013    History of backache    Personal history of other diseases of the musculoskeletal system and connective tissue     History of tendinitis    Personal history of other diseases of the nervous system and sense organs     History of migraine headaches    Personal history of other diseases of the respiratory system 08/19/2013    History of acute bronchitis    Personal history of other endocrine,  nutritional and metabolic disease 08/19/2013    History of hypothyroidism    Personal history of other specified conditions     History of urinary frequency      Past Surgical History:   Procedure Laterality Date    BLADDER SURGERY  09/20/2017    Bladder Surgery    BUNIONECTOMY  09/20/2017    Simple Bunion Exostectomy ([facility] Procedure)    CHOLECYSTECTOMY      GALLBLADDER SURGERY  09/20/2017    Gallbladder Surgery    HEMORRHOID SURGERY  09/20/2017    Hemorrhoidectomy    HYSTERECTOMY  09/20/2017    Hysterectomy    JOINT REPLACEMENT      OTHER SURGICAL HISTORY  02/27/2014    Hysterectomy Robotic-Assisted    TONSILLECTOMY  08/19/2013    Tonsillectomy        Allergies   Allergen Reactions    Butalbital-Acetaminop-Caf-Cod Shortness of breath     severe stomach pain-came into the ED  Oct 17 pt states he did go to ED but felt SOB and could not catch her breath    Butalbital-Acetaminophen-Caff Shortness of breath    Codeine Shortness of breath    Naproxen Sodium Rash        Current Outpatient Medications on File Prior to Visit   Medication Sig Dispense Refill    acetaminophen (Tylenol) 325 mg tablet Take 3 tablets (975 mg) by mouth every 8 hours.      acetaminophen (Tylenol) 500 mg tablet Take 2 tablets (1,000 mg) by mouth 3 times a day.      ALPRAZolam (Xanax) 0.5 mg tablet Take 1 tablet (0.5 mg) by mouth once daily as needed for anxiety. 20 tablet 0    aspirin 81 mg EC tablet Take 1 tablet (81 mg) by mouth once daily.      buPROPion XL (Wellbutrin XL) 300 mg 24 hr tablet Take 1 tablet (300 mg) by mouth once daily in the morning. Do not crush, chew, or split. 30 tablet 5    cholecalciferol (Vitamin D-3) 125 MCG (5000 UT) capsule Take by mouth.      glucosamine-chondroitin 500-400 mg tablet Take 1 tablet by mouth 3 times a day.      hydroCHLOROthiazide (HYDRODiuril) 25 mg tablet Take 1 tablet (25 mg) by mouth once daily. 90 tablet 1    hydroCHLOROthiazide (HYDRODiuril) 25 mg tablet Take 1 tablet (25 mg) by mouth once daily.  30 tablet 0    ibuprofen 800 mg tablet Take 1 tablet (800 mg) by mouth 3 times a day after meals.      levothyroxine (Synthroid, Levoxyl) 125 mcg tablet Take 1 tablet (125 mcg) by mouth once daily. 90 tablet 1    magnesium oxide (Mag-Ox) 400 mg tablet 1 tablet (400 mg) once daily.      metoprolol tartrate (Lopressor) 50 mg tablet TAKE 1 TABLET TWICE A DAY (NEED APPOINTMENT, LAST FILL) 180 tablet 0    multivitamin (One Daily Multivitamin) tablet Take 1 tablet by mouth once daily.      potassium chloride CR (Klor-Con M20) 20 mEq ER tablet TAKE 1 TABLET DAILY 90 tablet 1    rosuvastatin (Crestor) 10 mg tablet Take 1 tablet (10 mg) by mouth once daily.      [DISCONTINUED] metoprolol tartrate (Lopressor) 50 mg tablet TAKE 1 TABLET TWICE A DAY (NEED APPOINTMENT, LAST FILL) 180 tablet 1     No current facility-administered medications on file prior to visit.     Vitals:    02/15/24 1305   BP: 160/82   Pulse: 65   Resp: 16   Temp: 37 °C (98.6 °F)   SpO2: 97%     Review of Systems   Musculoskeletal:  Positive for gait problem.        See hpi for details   All other systems reviewed and are negative.     Physical Exam  Vitals and nursing note reviewed.   Constitutional:       Appearance: Normal appearance. She is obese.   HENT:      Head: Normocephalic and atraumatic.      Mouth/Throat:      Mouth: Mucous membranes are moist.      Pharynx: Oropharynx is clear.   Eyes:      Pupils: Pupils are equal, round, and reactive to light.   Cardiovascular:      Rate and Rhythm: Normal rate and regular rhythm.      Heart sounds: Normal heart sounds.      Comments: EKG today is NSR rate of 61  Pulmonary:      Effort: Pulmonary effort is normal.      Breath sounds: Normal breath sounds.   Abdominal:      General: Bowel sounds are normal.      Palpations: Abdomen is soft.   Musculoskeletal:         General: Normal range of motion.      Cervical back: Normal range of motion and neck supple.   Skin:     General: Skin is warm and dry.    Neurological:      General: No focal deficit present.      Mental Status: She is alert and oriented to person, place, and time.   Psychiatric:         Mood and Affect: Mood normal.         Behavior: Behavior normal.         Thought Content: Thought content normal.         Judgment: Judgment normal.        PAT AIRWAY:   Airway:     Mallampati::  IV    TM distance::  >3 FB    Neck ROM::  Full    Has dental crowns  Has one lower cap  Does not smoke  Drinks 1 glass a wine a week  No drug use  No issues with anesthesia  No family issues with anesthesia  No allergy to nickel, iodine or shellfish    Assessment and Plan:   Unilateral Primary Osteoarthritis Right Hip  Arthroplasty Total Hip Posterior Approach   Managed with tylenol    Hypertension  Managed with metoprolol 50mg bid  Managed with hydrochlorothiazide 25mg daily  Denies any dizziness, lightheadedness, chest pain, pressure or palpitations    Hypothyroidism  Managed with levothyroxine 125mcg daily  Last TSH was 2.06 on 8/28/23    Anxiety   Managed with Wllbutrin XL 300mg daily  Managed with xanax 0.5mg daily prn    ASA II  RCRI - 0 points  Class I Risk 3.9%  PATRICIA -4  points moderate Risk for DEUCE   NSQIP - Predicted length of stay 0-1 days  ARISCAT - 3 points Low Risk 1.6%  DASI 34.7 Points 7.01 Mets  CRYSTAL - 0.1%  JHFRAT -6  points low risk for falls  Clearance - not indicated  PAT Testing - CBC, CMP, PT/PTT, T&S, MRSA PCR, EKG    CHLORHEXIDINE .12% DENTAL RINSE E PRESCIRBED PER  INFECTION PREVENTION PROTOCOL. PATIENT EDUCATED   The patient advised to call [facility] if she does not receive the mouthwash    Face to Face patient contact time 20 minutes    NOELLE Handley-CNP 2/15/2024 12:56 PM

## 2024-02-15 NOTE — CPM/PAT H&P
CPM/PAT Evaluation   Alexis Yap is a 63 y.o. female   Chief Complaint: hip pain having a hip replacement    HPI:  Patient is a 64 y/o alert and oriented female coming in for PAT for a scheduled Arthroplasty Total Hip Posterior Approach on 2/27/24 w/ Dr. Morel.    The patient reports she has 7/10 sharp achy hip pain that is worse with walking and trying to get up from sitting.   She states sitting helps at times.  No recent injections or physical therapy.  Has concerns about the approach and advised to discuss with Dr. Morel.    Patient denies chest pain, SOB, DUMONT and NVDC.    Patient also denies Hx: DVT/PE.    Current medications were reviewed and a presurgical mediation schedule was provided.    She has no questions at this time.   Past Medical History:   Diagnosis Date    Acute sinusitis, unspecified 08/19/2013    Acute sinusitis    Allergy status to unspecified drugs, medicaments and biological substances     History of allergy    Chronic bronchitis (CMS/HCC)     Depression     Encounter for other general examination     Podiatry visit, routine    GERD (gastroesophageal reflux disease)     Hallux rigidus, unspecified foot     Hallux rigidus    Hypertension     Hypothyroidism     Irritable bowel syndrome     Other gastritis without bleeding 08/11/2016    Bile reflux gastritis    Other specified anxiety disorders     Depression with anxiety    Pain in left ankle and joints of left foot     Ankle pain, left    Personal history of other diseases of the musculoskeletal system and connective tissue 08/19/2013    History of backache    Personal history of other diseases of the musculoskeletal system and connective tissue     History of tendinitis    Personal history of other diseases of the nervous system and sense organs     History of migraine headaches    Personal history of other diseases of the respiratory system 08/19/2013    History of acute bronchitis    Personal history of other endocrine,  nutritional and metabolic disease 08/19/2013    History of hypothyroidism    Personal history of other specified conditions     History of urinary frequency      Past Surgical History:   Procedure Laterality Date    BLADDER SURGERY  09/20/2017    Bladder Surgery    BUNIONECTOMY  09/20/2017    Simple Bunion Exostectomy ([facility] Procedure)    CHOLECYSTECTOMY      GALLBLADDER SURGERY  09/20/2017    Gallbladder Surgery    HEMORRHOID SURGERY  09/20/2017    Hemorrhoidectomy    HYSTERECTOMY  09/20/2017    Hysterectomy    JOINT REPLACEMENT      OTHER SURGICAL HISTORY  02/27/2014    Hysterectomy Robotic-Assisted    TONSILLECTOMY  08/19/2013    Tonsillectomy        Allergies   Allergen Reactions    Butalbital-Acetaminop-Caf-Cod Shortness of breath     severe stomach pain-came into the ED  Oct 17 pt states he did go to ED but felt SOB and could not catch her breath    Butalbital-Acetaminophen-Caff Shortness of breath    Codeine Shortness of breath    Naproxen Sodium Rash        Current Outpatient Medications on File Prior to Visit   Medication Sig Dispense Refill    acetaminophen (Tylenol) 325 mg tablet Take 3 tablets (975 mg) by mouth every 8 hours.      acetaminophen (Tylenol) 500 mg tablet Take 2 tablets (1,000 mg) by mouth 3 times a day.      ALPRAZolam (Xanax) 0.5 mg tablet Take 1 tablet (0.5 mg) by mouth once daily as needed for anxiety. 20 tablet 0    aspirin 81 mg EC tablet Take 1 tablet (81 mg) by mouth once daily.      buPROPion XL (Wellbutrin XL) 300 mg 24 hr tablet Take 1 tablet (300 mg) by mouth once daily in the morning. Do not crush, chew, or split. 30 tablet 5    cholecalciferol (Vitamin D-3) 125 MCG (5000 UT) capsule Take by mouth.      glucosamine-chondroitin 500-400 mg tablet Take 1 tablet by mouth 3 times a day.      hydroCHLOROthiazide (HYDRODiuril) 25 mg tablet Take 1 tablet (25 mg) by mouth once daily. 90 tablet 1    hydroCHLOROthiazide (HYDRODiuril) 25 mg tablet Take 1 tablet (25 mg) by mouth once daily.  30 tablet 0    ibuprofen 800 mg tablet Take 1 tablet (800 mg) by mouth 3 times a day after meals.      levothyroxine (Synthroid, Levoxyl) 125 mcg tablet Take 1 tablet (125 mcg) by mouth once daily. 90 tablet 1    magnesium oxide (Mag-Ox) 400 mg tablet 1 tablet (400 mg) once daily.      metoprolol tartrate (Lopressor) 50 mg tablet TAKE 1 TABLET TWICE A DAY (NEED APPOINTMENT, LAST FILL) 180 tablet 0    multivitamin (One Daily Multivitamin) tablet Take 1 tablet by mouth once daily.      potassium chloride CR (Klor-Con M20) 20 mEq ER tablet TAKE 1 TABLET DAILY 90 tablet 1    rosuvastatin (Crestor) 10 mg tablet Take 1 tablet (10 mg) by mouth once daily.      [DISCONTINUED] metoprolol tartrate (Lopressor) 50 mg tablet TAKE 1 TABLET TWICE A DAY (NEED APPOINTMENT, LAST FILL) 180 tablet 1     No current facility-administered medications on file prior to visit.     Vitals:    02/15/24 1305   BP: 160/82   Pulse: 65   Resp: 16   Temp: 37 °C (98.6 °F)   SpO2: 97%     Review of Systems   Musculoskeletal:  Positive for gait problem.        See hpi for details   All other systems reviewed and are negative.     Physical Exam  Vitals and nursing note reviewed.   Constitutional:       Appearance: Normal appearance. She is obese.   HENT:      Head: Normocephalic and atraumatic.      Mouth/Throat:      Mouth: Mucous membranes are moist.      Pharynx: Oropharynx is clear.   Eyes:      Pupils: Pupils are equal, round, and reactive to light.   Cardiovascular:      Rate and Rhythm: Normal rate and regular rhythm.      Heart sounds: Normal heart sounds.      Comments: EKG today is NSR rate of 61  Pulmonary:      Effort: Pulmonary effort is normal.      Breath sounds: Normal breath sounds.   Abdominal:      General: Bowel sounds are normal.      Palpations: Abdomen is soft.   Musculoskeletal:         General: Normal range of motion.      Cervical back: Normal range of motion and neck supple.   Skin:     General: Skin is warm and dry.    Neurological:      General: No focal deficit present.      Mental Status: She is alert and oriented to person, place, and time.   Psychiatric:         Mood and Affect: Mood normal.         Behavior: Behavior normal.         Thought Content: Thought content normal.         Judgment: Judgment normal.        PAT AIRWAY:   Airway:     Mallampati::  IV    TM distance::  >3 FB    Neck ROM::  Full    Has dental crowns  Has one lower cap  Does not smoke  Drinks 1 glass a wine a week  No drug use  No issues with anesthesia  No family issues with anesthesia  No allergy to nickel, iodine or shellfish    Assessment and Plan:   Unilateral Primary Osteoarthritis Right Hip  Arthroplasty Total Hip Posterior Approach   Managed with tylenol    Hypertension  Managed with metoprolol 50mg bid  Managed with hydrochlorothiazide 25mg daily  Denies any dizziness, lightheadedness, chest pain, pressure or palpitations    Hypothyroidism  Managed with levothyroxine 125mcg daily  Last TSH was 2.06 on 8/28/23    Anxiety   Managed with Wllbutrin XL 300mg daily  Managed with xanax 0.5mg daily prn    ASA II  RCRI - 0 points  Class I Risk 3.9%  PATRICIA -4  points moderate Risk for DEUCE   NSQIP - Predicted length of stay 0-1 days  ARISCAT - 3 points Low Risk 1.6%  DASI 34.7 Points 7.01 Mets  CRYSTAL - 0.1%  JHFRAT -6  points low risk for falls  Clearance - not indicated  PAT Testing - CBC, CMP, PT/PTT, T&S, MRSA PCR, EKG    CHLORHEXIDINE .12% DENTAL RINSE E PRESCIRBED PER  INFECTION PREVENTION PROTOCOL. PATIENT EDUCATED   The patient advised to call [facility] if she does not receive the mouthwash    Face to Face patient contact time 20 minutes    NOELLE Handley-CNP 2/15/2024 12:56 PM

## 2024-02-16 LAB
ATRIAL RATE: 61 BPM
P AXIS: -13 DEGREES
P OFFSET: 166 MS
P ONSET: 128 MS
PR INTERVAL: 166 MS
Q ONSET: 211 MS
QRS COUNT: 10 BEATS
QRS DURATION: 102 MS
QT INTERVAL: 462 MS
QTC CALCULATION(BAZETT): 465 MS
QTC FREDERICIA: 464 MS
R AXIS: 11 DEGREES
T AXIS: 29 DEGREES
T OFFSET: 442 MS
VENTRICULAR RATE: 61 BPM

## 2024-02-16 NOTE — PROGRESS NOTES
Thank you for attending our Joint Replacement class today in preparation for your upcoming surgery.  Topics discussed include:    MyChart Enrollment  Communication with Care Team  My Chart is the best form of communication to reach all of your caregivers  You can send messages to specific care givers, or a care team  Continued Education  You will be enrolled in a Total Joint Replacement care plan to receive additional education before and after surgery  You can review a short recording of the class content  Access to Medical Records  You can access test results, office notes, appointments, etc.  You can connect to other healthcare systems who use [employer] ([facility], [facility], [facility], etc.)  [employer]  Program Information  Consent to Enroll    Background/Understanding of Joint Replacement Surgery  Potential for same day discharge  Any questions or concerns to be directed to the surgeon's office    How to Prepare for Surgery  Use of Nicotine Products/Smoking  Stop several weeks before surgery  Such products slow down the healing process and increase risk of post-op infection and complications  Clearance for Surgery  Medical Clearance by Specialists  Dental Clearance  Cracked/Broken/Loose teeth left untreated may postpone surgery  The importance of post-op antibiotics for dental visits per surgeon protocol  Preadmission Testing  **Potential for postponed surgery if appropriate clearance is not obtained  Medication Instruction  Follow instructions provided by the doctor who prescribes your medication (typically, but not limited to cardiologist)  Preadmission testing will provide additional instructions during your appointment on what to stop and what to take as you get closer to surgery  For clarification of these instructions, please call preadmission testing directly - [phone]  Tips for Preparing the home for discharge from the hospital  Care Partner  Requirement for surgery, the patient must have a plan to have  help at home  Potential for postponed surgery if plan for home support cannot be established  How the care partner can help after surgery  CHG Body Wash/Mouth Wash  Follow the instructions given at preadmission testing  Body wash is to be used on the body and hair for 5 washes  Mouthwash is to be used the night before and morning of surgery  **This is a system-wide protocol developed by infectious disease professionals, we will not alter our recommendations for those with sensitive skin or those who have special hair needs.  Please follow the instructions as they are written as this will provide the best infection prevention measures for surgery.  Should you have an allergy to one of the products, please discuss with your preadmission team**    What to Expect in the Hospital/At Home  Morning of Surgery NPO Guidelines  Nothing to eat after midnight  Water can be consumed up to 2 hours prior to arrival  Surgical and Post-Surgical Care Team  Surgical Team  Anesthesia Team  Nursing  Physical Therapy  Care Coordinating  Pharmacy  Hospital Arrival Instructions  Arrive at the time provided to you  Consider traffic patterns (rush-hour) based on arrival time  Have arrangements made for a ride home  If discharging same day, care partner should remain at the hospital  Recovering after Surgery  Recovery Room - Visitors are not brought back  Transition to hospital room - 2nd Floor, Visitors will be directed to your room  The presence of and strategies for controlling surgical pain and swelling  The importance of early mobility  Side effects after surgery  What to expect if staying overnight    Discharge Planning  The intended plan for discharge will be for patients to discharge home  All patients require a care partner (family, friend, neighbor, etc.) to stay with the patient for the first few nights after surgery  The inability to secure help at home will postpone surgery  Home Care Services set up per surgeon order  Physical  Therapy  Occupational Therapy  **If desired, private duty care can be arranged by the patient ahead of time**  Outpatient Physical Therapy per surgeon order    Recovering at Home  Wound Care  Follow wound care instructions found in your discharge paperwork  Bandage is water-resistant and you may shower with the bandage  Do not scrub directly over the bandage  Do not submerge in water until cleared (bathtub, hot tub, pool, etc.)    Post-Op Risk Prevention  Infection Prevention  Promptly seek treatment for any infections post-operatively  Routine dental visits must be postponed for 3 months after surgery  Your surgeon may require antibiotics prior to future dental visits  Any concerns for infection not related directly to the knee or the hip should be managed by your primary care provider  Blood Clots  Be sure to complete the course of blood thinning medication as prescribed by your surgeon  Movement every 1-2 hours during the day is encouraged to prevent blood clots  Monitor for signs of blood clots  Wear compression stockings as prescribed by your surgeon  Constipation  Constipation is common following surgery  Drink plenty of fluids  Take stool softener/laxative as prescribed by your surgeon  Move around frequently  Eat foods high in fiber  Fall Prevention  Prepare home ahead of time to clear space to move with walker  Remove throw rugs and electrical cords from walkways  Install railings near any stairways with more than 2 steps  Use night lights for increased visibility at night  Continue to use your assistive device until cleared by surgeon or physical therapy  Dislocation Prevention - Not all procedures will have dislocation precautions  Follow dislocation precautions provided by your surgeon  It is OK to resume sexual activity about 6 weeks following surgery  Be sure to follow any dislocation precautions assigned    Durable Medical Equipment  Cold Therapy  Breg Cold Therapy Machines  Ice/Gel Packs  Assistive  Devices  Folding Walker with Wheels (in the front only)  No Rollators  Crutches if approved by Physical Therapy and Surgeon after surgery  Hip Kits  Raised Toilet Seats  Additional Compression Stockings    Joint Preservation  Healthy Activities when Cleared  Walking  Swimming  Bike Riding  Activities to Avoid  Refrain from repetitive motions which have a high impact on the joint  Gradual Progression  Progress activity slowly, listen to your body  Common Findings - NORMAL after surgery  Clicking/Grinding  Numbness near incision    Physical Therapy  Prehabilitation exercises  START TODAY ON BOTH LEGS  Surgery Specific Precautions  Follow surgery specific precautions found in your discharge paperwork    Follow-Up Visit  All patients will see their surgeon for a follow up visit after surgery  The visit may range from 2-6 weeks after surgery and is surgeon specific      Please don't hesitate to reach out if you have any additional questions or concerns.    Jinny Lynn MBA, BSN, RN-BC  EL HollandN, RN  [facility] Navigators  [facility]   [phone]

## 2024-02-17 LAB — STAPHYLOCOCCUS SPEC CULT: NORMAL

## 2024-02-19 ENCOUNTER — OFFICE VISIT (OUTPATIENT)
Dept: ORTHOPEDIC SURGERY | Facility: CLINIC | Age: 64
End: 2024-02-19
Payer: COMMERCIAL

## 2024-02-19 VITALS — WEIGHT: 241 LBS | BODY MASS INDEX: 40.15 KG/M2 | HEIGHT: 65 IN

## 2024-02-19 DIAGNOSIS — M16.11 PRIMARY OSTEOARTHRITIS OF RIGHT HIP: Primary | ICD-10-CM

## 2024-02-19 PROCEDURE — 3048F LDL-C <100 MG/DL: CPT | Performed by: STUDENT IN AN ORGANIZED HEALTH CARE EDUCATION/TRAINING PROGRAM

## 2024-02-19 PROCEDURE — 1036F TOBACCO NON-USER: CPT | Performed by: STUDENT IN AN ORGANIZED HEALTH CARE EDUCATION/TRAINING PROGRAM

## 2024-02-19 PROCEDURE — 3044F HG A1C LEVEL LT 7.0%: CPT | Performed by: STUDENT IN AN ORGANIZED HEALTH CARE EDUCATION/TRAINING PROGRAM

## 2024-02-19 PROCEDURE — 99214 OFFICE O/P EST MOD 30 MIN: CPT | Performed by: STUDENT IN AN ORGANIZED HEALTH CARE EDUCATION/TRAINING PROGRAM

## 2024-02-19 ASSESSMENT — PAIN DESCRIPTION - DESCRIPTORS: DESCRIPTORS: ACHING;TENDER;SORE;SHARP

## 2024-02-19 ASSESSMENT — PAIN SCALES - GENERAL: PAINLEVEL_OUTOF10: 4

## 2024-02-19 ASSESSMENT — PAIN - FUNCTIONAL ASSESSMENT: PAIN_FUNCTIONAL_ASSESSMENT: 0-10

## 2024-02-20 ENCOUNTER — TELEPHONE (OUTPATIENT)
Dept: ORTHOPEDIC SURGERY | Facility: HOSPITAL | Age: 64
End: 2024-02-20
Payer: COMMERCIAL

## 2024-02-20 NOTE — TELEPHONE ENCOUNTER
Thank you for taking my call today.  All questions were answered at the time of the call, but please feel free to reach out to me via Nexv[employer] or phone, [phone], with any new questions or concerns.       We confirmed that you opted to enroll in our [id number] program so your discharge prescriptions will be available to take home at the time of discharge.  Please bring any prescription insurance coverage with you on the morning of surgery so that we can enter the information into our system.     We confirmed that your plan would be to Discharge Home same day ([facility]).    We confirmed that you have DME needed for recovery.     Use the provided body wash for 4 days before surgery and complete a 5th shower on the morning of surgery, this includes your body and hair.  Follow the directions as provided during preadmission testing.  The mouth wash will be used the night before and the morning of surgery.       As a reminder, if you do not hear from our team, please call [phone] between 2pm and 3pm the business day before your surgery to confirm your arrival time and details.        Please don't hesitate to reach out with additional questions or concerns.    Jinny Lynn MBA, BSN, RN-BC  EL HollandN, RN  Orthopedic Program Navigators  [facility]  [phone]

## 2024-02-21 ENCOUNTER — EDUCATION (OUTPATIENT)
Dept: ORTHOPEDIC SURGERY | Facility: HOSPITAL | Age: 64
End: 2024-02-21
Payer: COMMERCIAL

## 2024-02-21 ASSESSMENT — HOOS JR
GOING UP OR DOWN STAIRS: SEVERE
LYING IN BED (TURNING OVER, MAINTAINING HIP POSITION): MILD
HOOS JR TOTAL INTERVAL SCORE: 49.86
BENDING TO THE FLOOR TO PICK UP OBJECT: MODERATE
RISING FROM SITTING: SEVERE
SITTING: MODERATE
WALKING ON UNEVEN SURFACE: MODERATE

## 2024-02-24 ENCOUNTER — DOCUMENTATION (OUTPATIENT)
Dept: ORTHOPEDIC SURGERY | Facility: CLINIC | Age: 64
End: 2024-02-24
Payer: COMMERCIAL

## 2024-02-25 RX ORDER — DOCUSATE SODIUM 100 MG/1
100 CAPSULE, LIQUID FILLED ORAL 2 TIMES DAILY
Qty: 30 CAPSULE | Refills: 0 | Status: SHIPPED | OUTPATIENT
Start: 2024-02-25 | End: 2024-03-13

## 2024-02-25 RX ORDER — OXYCODONE HYDROCHLORIDE 5 MG/1
5-10 TABLET ORAL EVERY 6 HOURS PRN
Qty: 40 TABLET | Refills: 0 | Status: SHIPPED | OUTPATIENT
Start: 2024-02-25 | End: 2024-03-05

## 2024-02-25 RX ORDER — MELOXICAM 15 MG/1
15 TABLET ORAL DAILY
Qty: 60 TABLET | Refills: 0 | Status: SHIPPED | OUTPATIENT
Start: 2024-02-25 | End: 2024-02-25 | Stop reason: HOSPADM

## 2024-02-25 RX ORDER — NAPROXEN SODIUM 220 MG/1
81 TABLET, FILM COATED ORAL 2 TIMES DAILY
Qty: 84 TABLET | Refills: 0 | Status: SHIPPED | OUTPATIENT
Start: 2024-02-25 | End: 2024-04-09

## 2024-02-25 RX ORDER — SENNOSIDES 8.6 MG/1
1 TABLET ORAL DAILY
Qty: 15 TABLET | Refills: 0 | Status: SHIPPED | OUTPATIENT
Start: 2024-02-25 | End: 2024-03-13

## 2024-02-25 RX ORDER — ACETAMINOPHEN 500 MG
1000 TABLET ORAL EVERY 8 HOURS
Qty: 360 TABLET | Refills: 0 | Status: SHIPPED | OUTPATIENT
Start: 2024-02-25 | End: 2024-02-25 | Stop reason: HOSPADM

## 2024-02-25 RX ORDER — PANTOPRAZOLE SODIUM 40 MG/1
40 TABLET, DELAYED RELEASE ORAL
Qty: 30 TABLET | Refills: 0 | Status: SHIPPED | OUTPATIENT
Start: 2024-02-25 | End: 2024-02-25 | Stop reason: HOSPADM

## 2024-02-25 RX ORDER — ACETAMINOPHEN 500 MG
1000 TABLET ORAL EVERY 8 HOURS
Qty: 360 TABLET | Refills: 0 | Status: SHIPPED | OUTPATIENT
Start: 2024-02-25 | End: 2024-04-27

## 2024-02-25 RX ORDER — DOCUSATE SODIUM 100 MG/1
100 CAPSULE, LIQUID FILLED ORAL 2 TIMES DAILY
Qty: 30 CAPSULE | Refills: 0 | Status: SHIPPED | OUTPATIENT
Start: 2024-02-25 | End: 2024-02-25 | Stop reason: HOSPADM

## 2024-02-25 RX ORDER — CEFADROXIL 500 MG/1
500 CAPSULE ORAL 2 TIMES DAILY
Qty: 14 CAPSULE | Refills: 0 | Status: SHIPPED | OUTPATIENT
Start: 2024-02-25 | End: 2024-03-05

## 2024-02-25 RX ORDER — CEFADROXIL 500 MG/1
500 CAPSULE ORAL 2 TIMES DAILY
Qty: 14 CAPSULE | Refills: 0 | Status: SHIPPED | OUTPATIENT
Start: 2024-02-25 | End: 2024-02-25 | Stop reason: HOSPADM

## 2024-02-25 RX ORDER — ONDANSETRON 4 MG/1
4 TABLET, FILM COATED ORAL EVERY 8 HOURS PRN
Qty: 20 TABLET | Refills: 0 | Status: SHIPPED | OUTPATIENT
Start: 2024-02-25 | End: 2024-02-25 | Stop reason: HOSPADM

## 2024-02-25 RX ORDER — MELOXICAM 15 MG/1
15 TABLET ORAL DAILY
Qty: 60 TABLET | Refills: 0 | Status: SHIPPED | OUTPATIENT
Start: 2024-02-25 | End: 2024-04-25

## 2024-02-25 RX ORDER — TRAMADOL HYDROCHLORIDE 50 MG/1
50-100 TABLET ORAL EVERY 6 HOURS PRN
Qty: 40 TABLET | Refills: 0 | Status: SHIPPED | OUTPATIENT
Start: 2024-02-25 | End: 2024-02-25 | Stop reason: HOSPADM

## 2024-02-25 RX ORDER — SENNOSIDES 8.6 MG/1
1 TABLET ORAL DAILY
Qty: 15 TABLET | Refills: 0 | Status: SHIPPED | OUTPATIENT
Start: 2024-02-25 | End: 2024-02-25 | Stop reason: HOSPADM

## 2024-02-25 RX ORDER — TRAMADOL HYDROCHLORIDE 50 MG/1
50-100 TABLET ORAL EVERY 6 HOURS PRN
Qty: 40 TABLET | Refills: 0 | Status: SHIPPED | OUTPATIENT
Start: 2024-02-25 | End: 2024-03-05

## 2024-02-25 RX ORDER — ONDANSETRON 4 MG/1
4 TABLET, FILM COATED ORAL EVERY 8 HOURS PRN
Qty: 20 TABLET | Refills: 0 | Status: SHIPPED | OUTPATIENT
Start: 2024-02-25

## 2024-02-25 RX ORDER — OXYCODONE HYDROCHLORIDE 5 MG/1
5-10 TABLET ORAL EVERY 6 HOURS PRN
Qty: 40 TABLET | Refills: 0 | Status: SHIPPED | OUTPATIENT
Start: 2024-02-25 | End: 2024-02-25 | Stop reason: HOSPADM

## 2024-02-25 RX ORDER — PANTOPRAZOLE SODIUM 40 MG/1
40 TABLET, DELAYED RELEASE ORAL
Qty: 30 TABLET | Refills: 0 | Status: SHIPPED | OUTPATIENT
Start: 2024-02-25 | End: 2024-03-26

## 2024-02-25 RX ORDER — NAPROXEN SODIUM 220 MG/1
81 TABLET, FILM COATED ORAL 2 TIMES DAILY
Qty: 84 TABLET | Refills: 0 | Status: SHIPPED | OUTPATIENT
Start: 2024-02-25 | End: 2024-02-25 | Stop reason: HOSPADM

## 2024-02-25 NOTE — PROGRESS NOTES
Blanca Morel MD    Adult Reconstruction and Joint Replacement Surgery   Phone: [phone]     Fax: [phone]    Surgical Plan of Care       Patient: Alexis Yap                                                 MRN: [id number]   Diagnosis: Right hip arthritis       Disposition:   [] Inpatient    [] AMB    [x] AMB Same Day       Site:  [x] [place]  [] Ciro (Time of day request: [] AM [] PM )      Case Complexity:  [x] 1  [] 2  [] 3        Laterality:  [] LEFT  [x] RIGHT  [] BILATERAL      Equipment:  [] Austin Table  [] Large C-Arm  [x] Cincinnatus table  [x] Flat-plate XR  [] Eddie flat     Special Requests:  MessageOne flexible reamers with ball-tip guidewire      PROCEDURE(S):       Total Hip (61357):     Approach:  [] Direct anterior  [x] Posterior      [x] Depuy VHN: Emphasys Gription Acetabulum, Actis Femur    Back up S-ROM     [] Cemented Depuy VHN: Emphasys Gription Acetabulum, Cassia Cemented Femur      [] Cincinnatus Calixto: Trident II Acetabulum, Insignia Femur      [] Yun Calixto: Trident II Acetabulum, Accolade II Femur      [] Yun Calixto: Trident II Acetabulum, Accolade II Femur, Modular Dual Mobility      [] Cincinnatus Calixto / Depuy:  Trident II Acetabulum, Actis Femur       [] Cemented Cincinnatus: Trident II Acetabulum, OmniFit DIANA Femur       Other:        PREOPERATIVE       Tranexamic Acid: [x] IV []Topical        Preoperative Antibiotics: []Hold until intraop cultures obtained []Per ID note        Heme: []Lovenox Bridge         PREADMISSION      LABS:      [x] Routine (CBC, BMP, T+S, INR, EKG)  [] CMP   [] UA   [] Urine cotinine test      [] ESR      []CRP   [] Hgba1c   [] Albumin   [] Fructosamine   [] PFTs      [] Type/Cross 1 unit  [] D-Dimer  [] Vit D    [] Rheum Factor   [] CCP Abs          IMAGING: Please make sure all imaging is done by PAT visit.      [] Ciro  [] BMC  [] CMC [] Outside:      [] CXR     [] XR Hip/Pelvis XR Knee         [] EOS Whole Body AP/Lat  [] EOS Hip to Ankle AP/Lat          [] EOS Pelvis AP/Lat (sitting)     [] XR Hip to Ankle AP  [] XR Pelvis Lat Sit/Stand        [] CT JANICE MRI    [] CT BIOMET              [] MRA/CTA                                    [] Fluoro-guided aspiration [] BLE Venous ultrasound to r/o DVT      [] Shoulder       [] Elbow   [] C spine AP, flex/ext lat       [] Other:         CONSULTS:      [x] Medicine  [] Cardiac    [] Anesthesia  [] Weight Management      [] Pain Mgmt    [] Heme  [] Infectious Dis   [] Vascular   [] Plastic Surg  [] Pulm      [] Other:          BLOOD PRODUCTS:       FFP units:  []Factor   []Autologous       DME / REHAB      [] Hinged Knee Brace  [] Knee Immobilizer  [] Philippon  [] Other      [] Polar Wave Ice Machine  [x] Polar Ice Packs       [] Preop PT Evaluation        [x] In-home PT  [x] Outpatient PT  [x] Home Health Care          HOLD PREOP MEDS:      [] Plavix (7 days)   [] Xarelto (72 hr) [] Coumadin (7 days)       [] Pradaxa  (5 days)        [] Eliquis (72 hrs)  [] Effient (7days)                        DMARDs:   [] Enbrel(2wks)   [] Cimzia (4wks)   [] Humira(2wks) [] Kevzara(4wks)      [] Remicade (4 wks)       [] Orencia(4wks)      [] Xeljanz (2 days)    [] Symponi (4wks)      [] Other:          POSTOP ANTICOAGULATION   [x] ASA 81 mg PO BID x 6 wks [] ASA 325mg PO BID x 6 wks             [] Xarelto      [] Eliquis          [] Coumadin        [] Lovenox             HISTORY/RESULTS      Patient Active Problem List   Diagnosis    Trigger ring finger of right hand    Melanocytic nevi of right upper limb, including shoulder    Melanocytic nevi of left upper limb, including shoulder    Abdominal bloating    Abdominal pain, epigastric    Abnormal EKG    Age-related nuclear cataract of both eyes    Anxiety    Arthritis of both knees    Bilateral presbyopia    Bile reflux gastritis    Borderline hypertension    Dizziness    Edema    Esophageal reflux    Feeling weak    Groin pain    Heartburn    Helicobacter pylori (H. pylori)  infection    Hip arthritis    History of arthroplasty of left hip    History of total hip replacement    Hypercholesteremia    Hyperopia of both eyes    Insomnia    Internal hemorrhoids    Irritable bowel syndrome with diarrhea    Hip pain    Knee pain    Lower urinary tract symptoms (LUTS)    Lung nodules    Melanocytic nevi of trunk    Menopausal symptoms    Morbid obesity (CMS/HCC)    Multiple acquired skin tags    Neoplasm of uncertain behavior of skin    OAB (overactive bladder)    Other hypertrophic disorders of the skin    Patellofemoral syndrome, right    Plica syndrome, right knee    Pre-diabetes    Primary osteoarthritis of both hips    Pruritus of vagina    Rectal bleeding    Skin changes due to chronic exposure to nonionizing radiation, unspecified    Knee osteoarthritis    Tear of meniscus, current    Urine frequency    Vitamin B deficiency    Vitamin D deficiency    Weight loss    HTN (hypertension)    Hypothyroidism    Unilateral primary osteoarthritis, right hip    Acquired hallux rigidus    Acute bronchitis    Acute sinusitis    Candidiasis    Cough    Diabetes mellitus (CMS/HCC)    Diarrhea    History of hypothyroidism    History of migraine    Nausea    Neck pain    Nuclear senile cataract    Obesity with body mass index 30 or greater    Pain of hand    Patellofemoral pain syndrome    Solitary pulmonary nodule    Urinary tract infection         Current Outpatient Medications   Medication Instructions    acetaminophen (Tylenol) 325 mg tablet 2 tablets, oral, Every 8 hours PRN    ALPRAZolam (XANAX) 0.5 mg, oral, Daily PRN    aspirin 81 mg EC tablet 1 tablet, oral, Daily    buPROPion XL (WELLBUTRIN XL) 300 mg, oral, Every morning, Do not crush, chew, or split.    chlorhexidine (Peridex) 0.12 % solution 15 mL, Mouth/Throat, As needed    cholecalciferol (VITAMIN D-3) 125 mcg, oral, Daily    glucosamine-chondroitin 500-400 mg tablet 2 tablets, oral, Daily    hydroCHLOROthiazide (HYDRODIURIL) 25 mg,  "oral, Daily    ibuprofen 800 mg, oral, 3 times daily PRN    levothyroxine (SYNTHROID, LEVOXYL) 125 mcg, oral, Daily    magnesium oxide (MAG-OX) 400 mg, Daily    metoprolol tartrate (Lopressor) 50 mg tablet TAKE 1 TABLET TWICE A DAY (NEED APPOINTMENT, LAST FILL)    naproxen (NAPROSYN) 500 mg, oral, 2 times daily PRN    potassium chloride CR (Klor-Con M20) 20 mEq ER tablet TAKE 1 TABLET DAILY    rosuvastatin (Crestor) 10 mg tablet 1 tablet, oral, Every other day, Takes at night    saccharomyces boulardii (FLORASTOR) 250 mg, oral, Daily    TURMERIC ORAL 1 capsule, oral, Daily         Allergies   Allergen Reactions    Butalbital-Acetaminop-Caf-Cod Shortness of breath     severe stomach pain-came into the ED  Oct 17 pt states he did go to ED but felt SOB and could not catch her breath    Butalbital-Acetaminophen-Caff Shortness of breath    Codeine Shortness of breath    Anaprox [Naproxen Sodium] Rash         Lab Results   Component Value Date    WBC 6.8 02/15/2024    HGB 14.7 02/15/2024    HCT 42.8 02/15/2024    MCV 89 02/15/2024     02/15/2024      Lab Results   Component Value Date    INR 0.9 02/15/2024    PROTIME 9.2 (L) 02/15/2024         Lab Results   Component Value Date    GLUCOSE 99 02/15/2024    CALCIUM 9.8 02/15/2024     02/15/2024    K 3.9 02/15/2024    CO2 29 02/15/2024     02/15/2024    BUN 23 02/15/2024    CREATININE 0.70 02/15/2024      No results found for: \"CKTOTAL\", \"CKMB\", \"CKMBINDEX\", \"TROPONINI\"   Lab Results   Component Value Date    HGBA1C 6.9 (H) 02/15/2024         No results found for: \"CRP\"   Lab Results   Component Value Date    SEDRATE 6 10/28/2022           "

## 2024-02-26 ENCOUNTER — ANESTHESIA EVENT (OUTPATIENT)
Dept: OPERATING ROOM | Facility: HOSPITAL | Age: 64
End: 2024-02-26
Payer: COMMERCIAL

## 2024-02-26 NOTE — DISCHARGE INSTRUCTIONS
Blanca Morel MD   Adult Reconstruction and Joint Replacement Surgery  Office: [phone]     Fax: [phone]       Total Hip Arthroplasty   Postoperative Instructions    CONTACT INFORMATION  Blanca Morel MD  Joint Replacement Surgeon   [facility]      [phone]     Jinny Lynn MBA, BSN, RN-BC  Ortho Coordinator [place]  [phone]    Bhanu Jacobo RN, BSN  Ortho Coordinator [facility]  [phone]    Julisa Olson BSN-BC  Ortho Nurse Navigator  [phone]    DRIVING AFTER SURGERY   Please discuss post-surgical, long-distance travel with your surgeon. You may not drive until you are off narcotics and cleared by your surgical team.     WOUND CARE   A waterproof dressing was placed over your surgical site. You may shower over this dressing after surgery and pat it dry. Please do not scrub, soak, or submerge your surgical site for at least three weeks after surgery to allow your incision to heal. Avoid using creams and ointments around your surgical site while it is healing.    Your dressing will be removed on post-operative day 7 by your physical therapist. You may leave the incision open to air after the bandage has been removed. Please do not submerge your incision in a hot tub/pool/lake/etc. until cleared by your surgeon. Please contact the office if there are any concerns with your wound.     Pain, swelling, and bruising are normal after total hip replacement surgery. You may alleviate these symptoms by following the post-operative pain regimen that was prescribed, icing your surgical site multiple times a day, and elevating your extremity throughout the day.     ACTIVITY AND HIP PRECAUTIONS  Please follow the post-operative activity and hip precautions that were described to you by your surgical team and physical therapists.    Weightbearing restriction: weightbearing as tolerated     If you had anterior total hip replacement surgery, please avoid  excessive hip extension and external rotation.    If you had posterior total hip replacement surgery, please avoid hip flexion greater than 90 degrees, adduction past midline, and internal rotation beyond neutral.    DISCHARGE MEDICATIONS  Pain  Please take the pain medications that were prescribed to you according to the prescribed schedule. You may not operate a motor vehicle while taking narcotic medications (e.g. Oxycodone, Tramadol).     Please take Tylenol and NSAIDs (if you are able) on a schedule as discussed in clinic. Please take the prescribed Pantoprazole to protect your stomach from ulceration after surgery while taking NSAIDs.     Please wean off the narcotic pain medications as soon as you are able.     Blood-Thinners  Please take the blood thinning medications that were prescribed according to the instructions from your surgeon. These are typically continued for 6 weeks postoperatively. This schedule may differ if you were already on blood-thinning medication prior to your surgery.     Nausea  You may feel nauseous after surgery due to the anesthetics or pain medications you are taking. You may take anti-nausea medication (e.g. Ondansetron) to help with these symptoms.     Stool Softeners   Please take the stool softeners that were prescribed at discharge (e.g. Colace, Senna) while you are on narcotic pain medications to minimize your risk of constipation.    Home Medications   You may restart your home medications at time of discharge according to your discharge instructions.    PHYSICAL THERAPY AND OCCUPATIONAL THERAPY   In-home physical therapy and occupational therapy will start within a few days of your discharge to home. You will transition to outpatient physical therapy around 2-3 weeks after your surgery.     FOLLOW-UP  You will see your surgical team around 2 weeks after surgery for a wound check (unless otherwise arranged) and between 4-6 weeks after surgery for X-rays and clinical  evaluation.    CALL YOUR SURGEON IF YOU HAVE:   Drainage that is not contained by your surgical dressing.  Redness, pain or swelling in your calf.   Severe pain that is not controlled by the pain regimen prescribed.   Fever >101 Fahrenheit presents for at least 48 hours or other signs of infection (wound drainage, redness around your surgical incision, increased joint pain)  Go to the emergency department or call 911 if you experience chest pain, shortness of breath or other emergent symptoms. Do not call your surgeon first.     ANTIBIOTIC PROPHYLAXIS AFTER JOINT REPLACEMENT SURGERY   Although it is a rare occurrence, an artificial joint can become infected by the bloodstream carrying infection from another part of the body to the replaced joint.  Therefore, it is important that any bacterial infection be treated promptly. If you are unsure of whether you need to take antibiotics, please call the office before taking any medication.  We advise that you take antibiotics prior to the following invasive procedures for a lifetime. Please wait at least 3-6 months after joint replacement surgery before undergoing dental work or cleaning.    Please take antibiotics one hour before any of the following procedures:  Routine dental cleaning or dental procedure  Root canal  Podiatry procedures that involve cutting into the skin  Dermatologic procedures that involve cutting into the skin.  (Not required for laser or freezing of skin)  Invasive procedures regarding the urinary tract     Antibiotics are not required for the following procedures:   Manicures/Pedicures  Colonoscopy/Endoscopy/Sigmoidoscopy  Routine gynecologic exams/procedures/PAP smears, D&C's  Cataract/laser eye surgery  Injection or blood work  Routine colds and flu and minor cuts and bruises    You may have a flu shot at any time following your surgery.

## 2024-02-27 ENCOUNTER — PHARMACY VISIT (OUTPATIENT)
Dept: PHARMACY | Facility: CLINIC | Age: 64
End: 2024-02-27
Payer: COMMERCIAL

## 2024-02-27 ENCOUNTER — HOME HEALTH ADMISSION (OUTPATIENT)
Dept: HOME HEALTH SERVICES | Facility: HOME HEALTH | Age: 64
End: 2024-02-27
Payer: COMMERCIAL

## 2024-02-27 ENCOUNTER — HOSPITAL ENCOUNTER (OUTPATIENT)
Facility: HOSPITAL | Age: 64
Setting detail: OBSERVATION
Discharge: HOME | End: 2024-02-28
Attending: STUDENT IN AN ORGANIZED HEALTH CARE EDUCATION/TRAINING PROGRAM | Admitting: STUDENT IN AN ORGANIZED HEALTH CARE EDUCATION/TRAINING PROGRAM
Payer: COMMERCIAL

## 2024-02-27 ENCOUNTER — ANESTHESIA (OUTPATIENT)
Dept: OPERATING ROOM | Facility: HOSPITAL | Age: 64
End: 2024-02-27
Payer: COMMERCIAL

## 2024-02-27 ENCOUNTER — APPOINTMENT (OUTPATIENT)
Dept: RADIOLOGY | Facility: HOSPITAL | Age: 64
End: 2024-02-27
Payer: COMMERCIAL

## 2024-02-27 ENCOUNTER — DOCUMENTATION (OUTPATIENT)
Dept: HOME HEALTH SERVICES | Facility: HOME HEALTH | Age: 64
End: 2024-02-27

## 2024-02-27 DIAGNOSIS — M16.11 UNILATERAL PRIMARY OSTEOARTHRITIS, RIGHT HIP: Primary | ICD-10-CM

## 2024-02-27 PROCEDURE — 2500000001 HC RX 250 WO HCPCS SELF ADMINISTERED DRUGS (ALT 637 FOR MEDICARE OP): Performed by: PHYSICIAN ASSISTANT

## 2024-02-27 PROCEDURE — 72170 X-RAY EXAM OF PELVIS: CPT | Performed by: RADIOLOGY

## 2024-02-27 PROCEDURE — C1776 JOINT DEVICE (IMPLANTABLE): HCPCS | Performed by: STUDENT IN AN ORGANIZED HEALTH CARE EDUCATION/TRAINING PROGRAM

## 2024-02-27 PROCEDURE — 2500000004 HC RX 250 GENERAL PHARMACY W/ HCPCS (ALT 636 FOR OP/ED)

## 2024-02-27 PROCEDURE — 2500000004 HC RX 250 GENERAL PHARMACY W/ HCPCS (ALT 636 FOR OP/ED): Performed by: NURSE ANESTHETIST, CERTIFIED REGISTERED

## 2024-02-27 PROCEDURE — 97162 PT EVAL MOD COMPLEX 30 MIN: CPT | Mod: GP

## 2024-02-27 PROCEDURE — 2500000004 HC RX 250 GENERAL PHARMACY W/ HCPCS (ALT 636 FOR OP/ED): Performed by: PHYSICIAN ASSISTANT

## 2024-02-27 PROCEDURE — 2500000001 HC RX 250 WO HCPCS SELF ADMINISTERED DRUGS (ALT 637 FOR MEDICARE OP): Performed by: HOSPITALIST

## 2024-02-27 PROCEDURE — 7100000002 HC RECOVERY ROOM TIME - EACH INCREMENTAL 1 MINUTE: Performed by: STUDENT IN AN ORGANIZED HEALTH CARE EDUCATION/TRAINING PROGRAM

## 2024-02-27 PROCEDURE — 7100000010 HC PHASE TWO TIME - EACH INCREMENTAL 1 MINUTE: Performed by: STUDENT IN AN ORGANIZED HEALTH CARE EDUCATION/TRAINING PROGRAM

## 2024-02-27 PROCEDURE — G0378 HOSPITAL OBSERVATION PER HR: HCPCS

## 2024-02-27 PROCEDURE — 2720000007 HC OR 272 NO HCPCS: Performed by: STUDENT IN AN ORGANIZED HEALTH CARE EDUCATION/TRAINING PROGRAM

## 2024-02-27 PROCEDURE — 72170 X-RAY EXAM OF PELVIS: CPT

## 2024-02-27 PROCEDURE — 94762 N-INVAS EAR/PLS OXIMTRY CONT: CPT

## 2024-02-27 PROCEDURE — C1713 ANCHOR/SCREW BN/BN,TIS/BN: HCPCS | Performed by: STUDENT IN AN ORGANIZED HEALTH CARE EDUCATION/TRAINING PROGRAM

## 2024-02-27 PROCEDURE — 96376 TX/PRO/DX INJ SAME DRUG ADON: CPT | Mod: 59 | Performed by: STUDENT IN AN ORGANIZED HEALTH CARE EDUCATION/TRAINING PROGRAM

## 2024-02-27 PROCEDURE — 3600000018 HC OR TIME - INITIAL BASE CHARGE - PROCEDURE LEVEL SIX: Performed by: STUDENT IN AN ORGANIZED HEALTH CARE EDUCATION/TRAINING PROGRAM

## 2024-02-27 PROCEDURE — 76942 ECHO GUIDE FOR BIOPSY: CPT | Performed by: ANESTHESIOLOGY

## 2024-02-27 PROCEDURE — 3700000002 HC GENERAL ANESTHESIA TIME - EACH INCREMENTAL 1 MINUTE: Performed by: STUDENT IN AN ORGANIZED HEALTH CARE EDUCATION/TRAINING PROGRAM

## 2024-02-27 PROCEDURE — 97530 THERAPEUTIC ACTIVITIES: CPT | Mod: GP

## 2024-02-27 PROCEDURE — 7100000001 HC RECOVERY ROOM TIME - INITIAL BASE CHARGE: Performed by: STUDENT IN AN ORGANIZED HEALTH CARE EDUCATION/TRAINING PROGRAM

## 2024-02-27 PROCEDURE — 3700000001 HC GENERAL ANESTHESIA TIME - INITIAL BASE CHARGE: Performed by: STUDENT IN AN ORGANIZED HEALTH CARE EDUCATION/TRAINING PROGRAM

## 2024-02-27 PROCEDURE — 2500000004 HC RX 250 GENERAL PHARMACY W/ HCPCS (ALT 636 FOR OP/ED): Performed by: STUDENT IN AN ORGANIZED HEALTH CARE EDUCATION/TRAINING PROGRAM

## 2024-02-27 PROCEDURE — 2500000004 HC RX 250 GENERAL PHARMACY W/ HCPCS (ALT 636 FOR OP/ED): Performed by: ANESTHESIOLOGY

## 2024-02-27 PROCEDURE — 3600000017 HC OR TIME - EACH INCREMENTAL 1 MINUTE - PROCEDURE LEVEL SIX: Performed by: STUDENT IN AN ORGANIZED HEALTH CARE EDUCATION/TRAINING PROGRAM

## 2024-02-27 PROCEDURE — 7100000009 HC PHASE TWO TIME - INITIAL BASE CHARGE: Performed by: STUDENT IN AN ORGANIZED HEALTH CARE EDUCATION/TRAINING PROGRAM

## 2024-02-27 PROCEDURE — 96374 THER/PROPH/DIAG INJ IV PUSH: CPT | Mod: 59 | Performed by: STUDENT IN AN ORGANIZED HEALTH CARE EDUCATION/TRAINING PROGRAM

## 2024-02-27 PROCEDURE — 2500000005 HC RX 250 GENERAL PHARMACY W/O HCPCS

## 2024-02-27 PROCEDURE — RXMED WILLOW AMBULATORY MEDICATION CHARGE

## 2024-02-27 PROCEDURE — A27130 PR TOTAL HIP ARTHROPLASTY: Performed by: ANESTHESIOLOGY

## 2024-02-27 PROCEDURE — 2500000001 HC RX 250 WO HCPCS SELF ADMINISTERED DRUGS (ALT 637 FOR MEDICARE OP)

## 2024-02-27 PROCEDURE — 96365 THER/PROPH/DIAG IV INF INIT: CPT | Mod: 59 | Performed by: STUDENT IN AN ORGANIZED HEALTH CARE EDUCATION/TRAINING PROGRAM

## 2024-02-27 PROCEDURE — 2500000005 HC RX 250 GENERAL PHARMACY W/O HCPCS: Performed by: NURSE ANESTHETIST, CERTIFIED REGISTERED

## 2024-02-27 PROCEDURE — 2500000005 HC RX 250 GENERAL PHARMACY W/O HCPCS: Performed by: STUDENT IN AN ORGANIZED HEALTH CARE EDUCATION/TRAINING PROGRAM

## 2024-02-27 PROCEDURE — A27130 PR TOTAL HIP ARTHROPLASTY: Performed by: NURSE ANESTHETIST, CERTIFIED REGISTERED

## 2024-02-27 PROCEDURE — 27130 TOTAL HIP ARTHROPLASTY: CPT | Performed by: STUDENT IN AN ORGANIZED HEALTH CARE EDUCATION/TRAINING PROGRAM

## 2024-02-27 PROCEDURE — 2780000003 HC OR 278 NO HCPCS: Performed by: STUDENT IN AN ORGANIZED HEALTH CARE EDUCATION/TRAINING PROGRAM

## 2024-02-27 PROCEDURE — 94760 N-INVAS EAR/PLS OXIMETRY 1: CPT

## 2024-02-27 DEVICE — PINNACLE CANCELLOUS BONE SCREW 6.5MM X 25MM
Type: IMPLANTABLE DEVICE | Site: HIP | Status: FUNCTIONAL
Brand: PINNACLE

## 2024-02-27 DEVICE — IMPLANTABLE DEVICE: Type: IMPLANTABLE DEVICE | Site: HIP | Status: FUNCTIONAL

## 2024-02-27 DEVICE — ACTIS DUOFIX HIP PROSTHESIS (FEMORAL STEM 12/14 TAPER CEMENTLESS SIZE 3 STD COLLAR)  CE
Type: IMPLANTABLE DEVICE | Site: HIP | Status: FUNCTIONAL
Brand: ACTIS

## 2024-02-27 RX ORDER — ONDANSETRON 4 MG/1
4 TABLET, ORALLY DISINTEGRATING ORAL EVERY 8 HOURS PRN
Status: DISCONTINUED | OUTPATIENT
Start: 2024-02-27 | End: 2024-02-28 | Stop reason: HOSPADM

## 2024-02-27 RX ORDER — BUPIVACAINE HCL/EPINEPHRINE 0.5-1:200K
VIAL (ML) INJECTION AS NEEDED
Status: DISCONTINUED | OUTPATIENT
Start: 2024-02-27 | End: 2024-02-27 | Stop reason: HOSPADM

## 2024-02-27 RX ORDER — IBUPROFEN 600 MG/1
600 TABLET ORAL 3 TIMES DAILY
Status: DISCONTINUED | OUTPATIENT
Start: 2024-02-28 | End: 2024-02-28 | Stop reason: HOSPADM

## 2024-02-27 RX ORDER — ONDANSETRON HYDROCHLORIDE 2 MG/ML
4 INJECTION, SOLUTION INTRAVENOUS ONCE AS NEEDED
Status: DISCONTINUED | OUTPATIENT
Start: 2024-02-27 | End: 2024-02-27 | Stop reason: HOSPADM

## 2024-02-27 RX ORDER — ACETAMINOPHEN 325 MG/1
650 TABLET ORAL EVERY 6 HOURS SCHEDULED
Status: DISCONTINUED | OUTPATIENT
Start: 2024-02-27 | End: 2024-02-28 | Stop reason: HOSPADM

## 2024-02-27 RX ORDER — SCOLOPAMINE TRANSDERMAL SYSTEM 1 MG/1
1 PATCH, EXTENDED RELEASE TRANSDERMAL ONCE
Status: DISCONTINUED | OUTPATIENT
Start: 2024-02-27 | End: 2024-02-28 | Stop reason: HOSPADM

## 2024-02-27 RX ORDER — MEPERIDINE HYDROCHLORIDE 25 MG/ML
12.5 INJECTION INTRAMUSCULAR; INTRAVENOUS; SUBCUTANEOUS EVERY 10 MIN PRN
Status: DISCONTINUED | OUTPATIENT
Start: 2024-02-27 | End: 2024-02-27 | Stop reason: HOSPADM

## 2024-02-27 RX ORDER — LANOLIN ALCOHOL/MO/W.PET/CERES
400 CREAM (GRAM) TOPICAL DAILY
Status: DISCONTINUED | OUTPATIENT
Start: 2024-02-27 | End: 2024-02-28 | Stop reason: HOSPADM

## 2024-02-27 RX ORDER — NALOXONE HYDROCHLORIDE 0.4 MG/ML
0.2 INJECTION, SOLUTION INTRAMUSCULAR; INTRAVENOUS; SUBCUTANEOUS EVERY 5 MIN PRN
Status: DISCONTINUED | OUTPATIENT
Start: 2024-02-27 | End: 2024-02-28 | Stop reason: HOSPADM

## 2024-02-27 RX ORDER — METHYLPREDNISOLONE ACETATE 40 MG/ML
INJECTION, SUSPENSION INTRA-ARTICULAR; INTRALESIONAL; INTRAMUSCULAR; SOFT TISSUE AS NEEDED
Status: DISCONTINUED | OUTPATIENT
Start: 2024-02-27 | End: 2024-02-27 | Stop reason: HOSPADM

## 2024-02-27 RX ORDER — FENTANYL CITRATE 50 UG/ML
50 INJECTION, SOLUTION INTRAMUSCULAR; INTRAVENOUS ONCE
Status: COMPLETED | OUTPATIENT
Start: 2024-02-27 | End: 2024-02-27

## 2024-02-27 RX ORDER — NORETHINDRONE AND ETHINYL ESTRADIOL 0.5-0.035
KIT ORAL AS NEEDED
Status: DISCONTINUED | OUTPATIENT
Start: 2024-02-27 | End: 2024-02-27

## 2024-02-27 RX ORDER — SODIUM CHLORIDE, SODIUM LACTATE, POTASSIUM CHLORIDE, CALCIUM CHLORIDE 600; 310; 30; 20 MG/100ML; MG/100ML; MG/100ML; MG/100ML
100 INJECTION, SOLUTION INTRAVENOUS CONTINUOUS
Status: DISCONTINUED | OUTPATIENT
Start: 2024-02-27 | End: 2024-02-28 | Stop reason: HOSPADM

## 2024-02-27 RX ORDER — LEVOTHYROXINE SODIUM 125 UG/1
125 TABLET ORAL DAILY
Status: DISCONTINUED | OUTPATIENT
Start: 2024-02-28 | End: 2024-02-28 | Stop reason: HOSPADM

## 2024-02-27 RX ORDER — OXYCODONE HYDROCHLORIDE 5 MG/1
10 TABLET ORAL EVERY 4 HOURS PRN
Status: DISCONTINUED | OUTPATIENT
Start: 2024-02-27 | End: 2024-02-28 | Stop reason: HOSPADM

## 2024-02-27 RX ORDER — ONDANSETRON HYDROCHLORIDE 2 MG/ML
4 INJECTION, SOLUTION INTRAVENOUS EVERY 8 HOURS PRN
Status: DISCONTINUED | OUTPATIENT
Start: 2024-02-27 | End: 2024-02-28 | Stop reason: HOSPADM

## 2024-02-27 RX ORDER — POLYETHYLENE GLYCOL 3350 17 G/17G
17 POWDER, FOR SOLUTION ORAL DAILY
Status: DISCONTINUED | OUTPATIENT
Start: 2024-02-27 | End: 2024-02-28 | Stop reason: HOSPADM

## 2024-02-27 RX ORDER — MIDAZOLAM HYDROCHLORIDE 1 MG/ML
2 INJECTION, SOLUTION INTRAMUSCULAR; INTRAVENOUS ONCE
Status: COMPLETED | OUTPATIENT
Start: 2024-02-27 | End: 2024-02-27

## 2024-02-27 RX ORDER — PROPOFOL 10 MG/ML
INJECTION, EMULSION INTRAVENOUS AS NEEDED
Status: DISCONTINUED | OUTPATIENT
Start: 2024-02-27 | End: 2024-02-27

## 2024-02-27 RX ORDER — LIDOCAINE HYDROCHLORIDE 10 MG/ML
0.1 INJECTION INFILTRATION; PERINEURAL ONCE
Status: DISCONTINUED | OUTPATIENT
Start: 2024-02-27 | End: 2024-02-27 | Stop reason: HOSPADM

## 2024-02-27 RX ORDER — ALPRAZOLAM 0.5 MG/1
0.5 TABLET ORAL DAILY PRN
Status: DISCONTINUED | OUTPATIENT
Start: 2024-02-27 | End: 2024-02-28 | Stop reason: HOSPADM

## 2024-02-27 RX ORDER — BUPIVACAINE HYDROCHLORIDE 7.5 MG/ML
INJECTION, SOLUTION EPIDURAL; RETROBULBAR AS NEEDED
Status: DISCONTINUED | OUTPATIENT
Start: 2024-02-27 | End: 2024-02-27

## 2024-02-27 RX ORDER — OXYCODONE HYDROCHLORIDE 5 MG/1
5 TABLET ORAL EVERY 4 HOURS PRN
Status: DISCONTINUED | OUTPATIENT
Start: 2024-02-27 | End: 2024-02-27 | Stop reason: HOSPADM

## 2024-02-27 RX ORDER — METOPROLOL TARTRATE 50 MG/1
50 TABLET ORAL 2 TIMES DAILY
Status: DISCONTINUED | OUTPATIENT
Start: 2024-02-27 | End: 2024-02-28 | Stop reason: HOSPADM

## 2024-02-27 RX ORDER — SODIUM CHLORIDE 9 MG/ML
20 INJECTION INTRAMUSCULAR; INTRAVENOUS; SUBCUTANEOUS ONCE
Status: DISCONTINUED | OUTPATIENT
Start: 2024-02-27 | End: 2024-02-27 | Stop reason: HOSPADM

## 2024-02-27 RX ORDER — SYRING-NEEDL,DISP,INSUL,0.3 ML 29 G X1/2"
297 SYRINGE, EMPTY DISPOSABLE MISCELLANEOUS ONCE AS NEEDED
Status: DISCONTINUED | OUTPATIENT
Start: 2024-02-27 | End: 2024-02-27

## 2024-02-27 RX ORDER — PANTOPRAZOLE SODIUM 40 MG/1
40 TABLET, DELAYED RELEASE ORAL
Status: DISCONTINUED | OUTPATIENT
Start: 2024-02-28 | End: 2024-02-28 | Stop reason: HOSPADM

## 2024-02-27 RX ORDER — TALC
6 POWDER (GRAM) TOPICAL NIGHTLY PRN
Status: DISCONTINUED | OUTPATIENT
Start: 2024-02-27 | End: 2024-02-28 | Stop reason: HOSPADM

## 2024-02-27 RX ORDER — SODIUM CHLORIDE 9 MG/ML
22 INJECTION INTRAMUSCULAR; INTRAVENOUS; SUBCUTANEOUS ONCE
Status: DISCONTINUED | OUTPATIENT
Start: 2024-02-27 | End: 2024-02-27 | Stop reason: HOSPADM

## 2024-02-27 RX ORDER — ATORVASTATIN CALCIUM 20 MG/1
20 TABLET, FILM COATED ORAL EVERY OTHER DAY
Status: DISCONTINUED | OUTPATIENT
Start: 2024-02-28 | End: 2024-02-28 | Stop reason: HOSPADM

## 2024-02-27 RX ORDER — SODIUM CHLORIDE, SODIUM LACTATE, POTASSIUM CHLORIDE, CALCIUM CHLORIDE 600; 310; 30; 20 MG/100ML; MG/100ML; MG/100ML; MG/100ML
100 INJECTION, SOLUTION INTRAVENOUS CONTINUOUS
Status: DISCONTINUED | OUTPATIENT
Start: 2024-02-27 | End: 2024-02-27

## 2024-02-27 RX ORDER — BUPIVACAINE HCL/EPINEPHRINE 0.5-1:200K
30 VIAL (ML) INJECTION ONCE
Status: DISCONTINUED | OUTPATIENT
Start: 2024-02-27 | End: 2024-02-27 | Stop reason: HOSPADM

## 2024-02-27 RX ORDER — OXYCODONE HYDROCHLORIDE 5 MG/1
2.5 TABLET ORAL EVERY 4 HOURS PRN
Status: DISCONTINUED | OUTPATIENT
Start: 2024-02-27 | End: 2024-02-28 | Stop reason: HOSPADM

## 2024-02-27 RX ORDER — BUPROPION HYDROCHLORIDE 150 MG/1
300 TABLET ORAL EVERY MORNING
Status: DISCONTINUED | OUTPATIENT
Start: 2024-02-28 | End: 2024-02-28 | Stop reason: HOSPADM

## 2024-02-27 RX ORDER — OXYCODONE HYDROCHLORIDE 5 MG/1
5 TABLET ORAL EVERY 6 HOURS PRN
Status: DISCONTINUED | OUTPATIENT
Start: 2024-02-27 | End: 2024-02-28 | Stop reason: HOSPADM

## 2024-02-27 RX ORDER — CEFAZOLIN 1 G/1
500 INJECTION, POWDER, FOR SOLUTION INTRAVENOUS ONCE
Status: DISCONTINUED | OUTPATIENT
Start: 2024-02-27 | End: 2024-02-27 | Stop reason: HOSPADM

## 2024-02-27 RX ORDER — FENTANYL CITRATE 50 UG/ML
25 INJECTION, SOLUTION INTRAMUSCULAR; INTRAVENOUS EVERY 5 MIN PRN
Status: DISCONTINUED | OUTPATIENT
Start: 2024-02-27 | End: 2024-02-27 | Stop reason: HOSPADM

## 2024-02-27 RX ORDER — DEXAMETHASONE SODIUM PHOSPHATE 4 MG/ML
INJECTION, SOLUTION INTRA-ARTICULAR; INTRALESIONAL; INTRAMUSCULAR; INTRAVENOUS; SOFT TISSUE AS NEEDED
Status: DISCONTINUED | OUTPATIENT
Start: 2024-02-27 | End: 2024-02-27

## 2024-02-27 RX ORDER — CEFAZOLIN SODIUM 2 G/100ML
2 INJECTION, SOLUTION INTRAVENOUS EVERY 8 HOURS
Status: COMPLETED | OUTPATIENT
Start: 2024-02-27 | End: 2024-02-28

## 2024-02-27 RX ORDER — TRANEXAMIC ACID 100 MG/ML
INJECTION, SOLUTION INTRAVENOUS AS NEEDED
Status: DISCONTINUED | OUTPATIENT
Start: 2024-02-27 | End: 2024-02-27

## 2024-02-27 RX ORDER — KETOROLAC TROMETHAMINE 15 MG/ML
15 INJECTION, SOLUTION INTRAMUSCULAR; INTRAVENOUS EVERY 6 HOURS
Status: COMPLETED | OUTPATIENT
Start: 2024-02-27 | End: 2024-02-28

## 2024-02-27 RX ORDER — BUPIVACAINE HYDROCHLORIDE 5 MG/ML
INJECTION, SOLUTION PERINEURAL AS NEEDED
Status: DISCONTINUED | OUTPATIENT
Start: 2024-02-27 | End: 2024-02-27 | Stop reason: HOSPADM

## 2024-02-27 RX ORDER — FENTANYL CITRATE 50 UG/ML
50 INJECTION, SOLUTION INTRAMUSCULAR; INTRAVENOUS EVERY 5 MIN PRN
Status: DISCONTINUED | OUTPATIENT
Start: 2024-02-27 | End: 2024-02-27 | Stop reason: HOSPADM

## 2024-02-27 RX ORDER — SODIUM CHLORIDE, SODIUM LACTATE, POTASSIUM CHLORIDE, CALCIUM CHLORIDE 600; 310; 30; 20 MG/100ML; MG/100ML; MG/100ML; MG/100ML
100 INJECTION, SOLUTION INTRAVENOUS CONTINUOUS
Status: DISCONTINUED | OUTPATIENT
Start: 2024-02-27 | End: 2024-02-27 | Stop reason: HOSPADM

## 2024-02-27 RX ORDER — CEFAZOLIN 1 G/1
INJECTION, POWDER, FOR SOLUTION INTRAVENOUS AS NEEDED
Status: DISCONTINUED | OUTPATIENT
Start: 2024-02-27 | End: 2024-02-27 | Stop reason: HOSPADM

## 2024-02-27 RX ORDER — CEFAZOLIN SODIUM 2 G/100ML
2 INJECTION, SOLUTION INTRAVENOUS ONCE
Status: COMPLETED | OUTPATIENT
Start: 2024-02-27 | End: 2024-02-27

## 2024-02-27 RX ORDER — ONDANSETRON HYDROCHLORIDE 2 MG/ML
INJECTION, SOLUTION INTRAVENOUS AS NEEDED
Status: DISCONTINUED | OUTPATIENT
Start: 2024-02-27 | End: 2024-02-27

## 2024-02-27 RX ORDER — BUPIVACAINE HYDROCHLORIDE 5 MG/ML
20 INJECTION, SOLUTION PERINEURAL ONCE
Status: DISCONTINUED | OUTPATIENT
Start: 2024-02-27 | End: 2024-02-27 | Stop reason: HOSPADM

## 2024-02-27 RX ORDER — ASPIRIN 81 MG/1
81 TABLET ORAL 2 TIMES DAILY
Status: DISCONTINUED | OUTPATIENT
Start: 2024-02-27 | End: 2024-02-28 | Stop reason: HOSPADM

## 2024-02-27 RX ORDER — LABETALOL HYDROCHLORIDE 5 MG/ML
5 INJECTION, SOLUTION INTRAVENOUS ONCE AS NEEDED
Status: DISCONTINUED | OUTPATIENT
Start: 2024-02-27 | End: 2024-02-27 | Stop reason: HOSPADM

## 2024-02-27 RX ORDER — BISACODYL 5 MG
10 TABLET, DELAYED RELEASE (ENTERIC COATED) ORAL DAILY PRN
Status: DISCONTINUED | OUTPATIENT
Start: 2024-02-27 | End: 2024-02-28 | Stop reason: HOSPADM

## 2024-02-27 RX ADMIN — ACETAMINOPHEN 650 MG: 325 TABLET ORAL at 18:08

## 2024-02-27 RX ADMIN — EPHEDRINE SULFATE 25 MG: 50 INJECTION, SOLUTION INTRAVENOUS at 11:54

## 2024-02-27 RX ADMIN — Medication 6 MG: at 20:30

## 2024-02-27 RX ADMIN — PROPOFOL 1500 MG: 10 INJECTION, EMULSION INTRAVENOUS at 10:32

## 2024-02-27 RX ADMIN — CEFAZOLIN SODIUM 2 G: 2 INJECTION, SOLUTION INTRAVENOUS at 18:08

## 2024-02-27 RX ADMIN — Medication 400 MG: at 18:10

## 2024-02-27 RX ADMIN — EPHEDRINE SULFATE 25 MG: 50 INJECTION, SOLUTION INTRAVENOUS at 11:48

## 2024-02-27 RX ADMIN — CEFAZOLIN SODIUM 2 G: 2 INJECTION, SOLUTION INTRAVENOUS at 10:23

## 2024-02-27 RX ADMIN — FENTANYL CITRATE 50 MCG: 50 INJECTION INTRAMUSCULAR; INTRAVENOUS at 09:44

## 2024-02-27 RX ADMIN — MIDAZOLAM 2 MG: 1 INJECTION INTRAMUSCULAR; INTRAVENOUS at 09:44

## 2024-02-27 RX ADMIN — TRANEXAMIC ACID 1000 MG: 100 INJECTION, SOLUTION INTRAVENOUS at 10:33

## 2024-02-27 RX ADMIN — ONDANSETRON 4 MG: 4 TABLET, ORALLY DISINTEGRATING ORAL at 15:07

## 2024-02-27 RX ADMIN — DEXAMETHASONE SODIUM PHOSPHATE 8 MG: 4 INJECTION, SOLUTION INTRAMUSCULAR; INTRAVENOUS at 12:19

## 2024-02-27 RX ADMIN — METOPROLOL TARTRATE 50 MG: 50 TABLET ORAL at 20:25

## 2024-02-27 RX ADMIN — TRANEXAMIC ACID 1000 MG: 100 INJECTION, SOLUTION INTRAVENOUS at 11:16

## 2024-02-27 RX ADMIN — SODIUM CHLORIDE, SODIUM LACTATE, POTASSIUM CHLORIDE, AND CALCIUM CHLORIDE 100 ML/HR: 600; 310; 30; 20 INJECTION, SOLUTION INTRAVENOUS at 08:52

## 2024-02-27 RX ADMIN — SODIUM CHLORIDE, POTASSIUM CHLORIDE, SODIUM LACTATE AND CALCIUM CHLORIDE 100 ML/HR: 600; 310; 30; 20 INJECTION, SOLUTION INTRAVENOUS at 15:00

## 2024-02-27 RX ADMIN — FENTANYL CITRATE 25 MCG: 50 INJECTION INTRAMUSCULAR; INTRAVENOUS at 13:43

## 2024-02-27 RX ADMIN — KETOROLAC TROMETHAMINE 15 MG: 15 INJECTION, SOLUTION INTRAMUSCULAR; INTRAVENOUS at 16:22

## 2024-02-27 RX ADMIN — ONDANSETRON 4 MG: 2 INJECTION, SOLUTION INTRAMUSCULAR; INTRAVENOUS at 12:19

## 2024-02-27 RX ADMIN — ASPIRIN 81 MG: 81 TABLET, COATED ORAL at 20:25

## 2024-02-27 RX ADMIN — KETOROLAC TROMETHAMINE 15 MG: 15 INJECTION, SOLUTION INTRAMUSCULAR; INTRAVENOUS at 20:31

## 2024-02-27 RX ADMIN — POLYETHYLENE GLYCOL 3350 17 G: 17 POWDER, FOR SOLUTION ORAL at 20:25

## 2024-02-27 RX ADMIN — BUPIVACAINE HYDROCHLORIDE 1.6 ML: 7.5 INJECTION, SOLUTION EPIDURAL; RETROBULBAR at 10:28

## 2024-02-27 SDOH — SOCIAL STABILITY: SOCIAL INSECURITY: WERE YOU ABLE TO COMPLETE ALL THE BEHAVIORAL HEALTH SCREENINGS?: YES

## 2024-02-27 SDOH — SOCIAL STABILITY: SOCIAL INSECURITY: ABUSE: ADULT

## 2024-02-27 SDOH — SOCIAL STABILITY: SOCIAL INSECURITY: HAS ANYONE EVER THREATENED TO HURT YOUR FAMILY OR YOUR PETS?: NO

## 2024-02-27 SDOH — SOCIAL STABILITY: SOCIAL INSECURITY: DO YOU FEEL ANYONE HAS EXPLOITED OR TAKEN ADVANTAGE OF YOU FINANCIALLY OR OF YOUR PERSONAL PROPERTY?: NO

## 2024-02-27 SDOH — SOCIAL STABILITY: SOCIAL INSECURITY: DOES ANYONE TRY TO KEEP YOU FROM HAVING/CONTACTING OTHER FRIENDS OR DOING THINGS OUTSIDE YOUR HOME?: NO

## 2024-02-27 SDOH — SOCIAL STABILITY: SOCIAL INSECURITY: ARE THERE ANY APPARENT SIGNS OF INJURIES/BEHAVIORS THAT COULD BE RELATED TO ABUSE/NEGLECT?: NO

## 2024-02-27 SDOH — SOCIAL STABILITY: SOCIAL INSECURITY: ARE YOU OR HAVE YOU BEEN THREATENED OR ABUSED PHYSICALLY, EMOTIONALLY, OR SEXUALLY BY ANYONE?: NO

## 2024-02-27 SDOH — SOCIAL STABILITY: SOCIAL INSECURITY: HAVE YOU HAD THOUGHTS OF HARMING ANYONE ELSE?: NO

## 2024-02-27 SDOH — SOCIAL STABILITY: SOCIAL INSECURITY: DO YOU FEEL UNSAFE GOING BACK TO THE PLACE WHERE YOU ARE LIVING?: NO

## 2024-02-27 ASSESSMENT — COGNITIVE AND FUNCTIONAL STATUS - GENERAL
MOBILITY SCORE: 18
MOBILITY SCORE: 19
STANDING UP FROM CHAIR USING ARMS: A LITTLE
HELP NEEDED FOR BATHING: A LITTLE
CLIMB 3 TO 5 STEPS WITH RAILING: A LITTLE
MOVING TO AND FROM BED TO CHAIR: A LITTLE
CLIMB 3 TO 5 STEPS WITH RAILING: A LITTLE
MOVING FROM LYING ON BACK TO SITTING ON SIDE OF FLAT BED WITH BEDRAILS: A LITTLE
MOVING FROM LYING ON BACK TO SITTING ON SIDE OF FLAT BED WITH BEDRAILS: A LITTLE
WALKING IN HOSPITAL ROOM: A LITTLE
TURNING FROM BACK TO SIDE WHILE IN FLAT BAD: A LITTLE
WALKING IN HOSPITAL ROOM: A LITTLE
DRESSING REGULAR LOWER BODY CLOTHING: A LITTLE
TURNING FROM BACK TO SIDE WHILE IN FLAT BAD: A LITTLE
MOVING TO AND FROM BED TO CHAIR: A LITTLE
MOBILITY SCORE: 16
DAILY ACTIVITIY SCORE: 22
WALKING IN HOSPITAL ROOM: A LITTLE
CLIMB 3 TO 5 STEPS WITH RAILING: A LITTLE
MOVING TO AND FROM BED TO CHAIR: A LITTLE
TURNING FROM BACK TO SIDE WHILE IN FLAT BAD: A LOT
STANDING UP FROM CHAIR USING ARMS: A LITTLE
MOVING FROM LYING ON BACK TO SITTING ON SIDE OF FLAT BED WITH BEDRAILS: A LOT

## 2024-02-27 ASSESSMENT — ACTIVITIES OF DAILY LIVING (ADL)
DRESSING YOURSELF: INDEPENDENT
ASSISTIVE_DEVICE: WALKER
HEARING - LEFT EAR: FUNCTIONAL
PATIENT'S MEMORY ADEQUATE TO SAFELY COMPLETE DAILY ACTIVITIES?: YES
WALKS IN HOME: INDEPENDENT
HEARING - RIGHT EAR: FUNCTIONAL
TOILETING: NEEDS ASSISTANCE
ADEQUATE_TO_COMPLETE_ADL: YES
JUDGMENT_ADEQUATE_SAFELY_COMPLETE_DAILY_ACTIVITIES: YES
FEEDING YOURSELF: INDEPENDENT
ADL_ASSISTANCE: INDEPENDENT
BATHING: INDEPENDENT
GROOMING: INDEPENDENT
LACK_OF_TRANSPORTATION: NO

## 2024-02-27 ASSESSMENT — PAIN - FUNCTIONAL ASSESSMENT
PAIN_FUNCTIONAL_ASSESSMENT: WONG-BAKER FACES
PAIN_FUNCTIONAL_ASSESSMENT: 0-10

## 2024-02-27 ASSESSMENT — PATIENT HEALTH QUESTIONNAIRE - PHQ9
1. LITTLE INTEREST OR PLEASURE IN DOING THINGS: NOT AT ALL
SUM OF ALL RESPONSES TO PHQ9 QUESTIONS 1 & 2: 0
2. FEELING DOWN, DEPRESSED OR HOPELESS: NOT AT ALL

## 2024-02-27 ASSESSMENT — PAIN SCALES - GENERAL
PAINLEVEL_OUTOF10: 3
PAINLEVEL_OUTOF10: 6
PAINLEVEL_OUTOF10: 2
PAIN_LEVEL: 0
PAINLEVEL_OUTOF10: 0 - NO PAIN
PAINLEVEL_OUTOF10: 4
PAINLEVEL_OUTOF10: 0 - NO PAIN

## 2024-02-27 ASSESSMENT — LIFESTYLE VARIABLES
HOW MANY STANDARD DRINKS CONTAINING ALCOHOL DO YOU HAVE ON A TYPICAL DAY: 1 OR 2
AUDIT-C TOTAL SCORE: 2
AUDIT-C TOTAL SCORE: 2
HOW OFTEN DO YOU HAVE A DRINK CONTAINING ALCOHOL: 2-4 TIMES A MONTH
HOW OFTEN DO YOU HAVE 6 OR MORE DRINKS ON ONE OCCASION: NEVER
SKIP TO QUESTIONS 9-10: 1

## 2024-02-27 ASSESSMENT — COLUMBIA-SUICIDE SEVERITY RATING SCALE - C-SSRS
1. IN THE PAST MONTH, HAVE YOU WISHED YOU WERE DEAD OR WISHED YOU COULD GO TO SLEEP AND NOT WAKE UP?: NO
6. HAVE YOU EVER DONE ANYTHING, STARTED TO DO ANYTHING, OR PREPARED TO DO ANYTHING TO END YOUR LIFE?: NO
2. HAVE YOU ACTUALLY HAD ANY THOUGHTS OF KILLING YOURSELF?: NO

## 2024-02-27 ASSESSMENT — PAIN SCALES - WONG BAKER: WONGBAKER_NUMERICALRESPONSE: HURTS EVEN MORE

## 2024-02-27 ASSESSMENT — PAIN DESCRIPTION - DESCRIPTORS
DESCRIPTORS: ACHING;SHARP;SHOOTING;STABBING
DESCRIPTORS: ACHING;BURNING
DESCRIPTORS: ACHING

## 2024-02-27 NOTE — NURSING NOTE
Pt admitted to room 203 from rapid recovery, VSS no c/o nausea or vomiting. Pt oriented to room, call light and plan of care. Family at bedside.

## 2024-02-27 NOTE — PROGRESS NOTES
Met with Patient at bedside- Patient is s/p Right Posterior Hip Replacement with Dr. Blanca Morel.  Discussion with patient included education on the following topics: [facility] Education: Wound Care, Post-Op Activity, Post-Op Precautions, Cold-Therapy, Importance of post-op prescriptions, When to call the Surgeon's Office, Use of MyChart, and When to call 9-1-1.  Patient Did Complete and Live class prior to surgery.  Patient is able to verbalize understanding of class content/discussion.  Contact information was provided to patient for support and assistance during the post-operative period.

## 2024-02-27 NOTE — PERIOPERATIVE NURSING NOTE
"Pt medicated for chronic back pain (operative hip is \"fine, just a little burn\"). Modalities reviewed with pt.  Pt repositioned, monitors back on.  "

## 2024-02-27 NOTE — POST-PROCEDURE NOTE
Pt arrived to [facility], oriented to room and surroundings, informed of plan of care.    1600- Dr. Morel up to see pt and informed of pt's inability to participate with Physical Therapy safely. Planned for pt to stay overnight for observation.    1620- Pt. Prepared for transfer to nursing unit, report given to Jackelyn-robert.

## 2024-02-27 NOTE — ANESTHESIA PROCEDURE NOTES
Peripheral Block    Patient location during procedure: pre-op  Start time: 2/27/2024 9:48 AM  End time: 2/27/2024 9:57 AM  Reason for block: at surgeon's request and post-op pain management  Staffing  Performed: attending   Authorized by: Rufus Caceres MD    Performed by: Rufus Caceres MD  Preanesthetic Checklist  Completed: patient identified, IV checked, site marked, risks and benefits discussed, surgical consent, monitors and equipment checked, pre-op evaluation and timeout performed   Timeout performed at: 2/27/2024 9:43 AM  Peripheral Block  Patient position: laying flat  Prep: ChloraPrep  Patient monitoring: heart rate, cardiac monitor and continuous pulse ox  Block type: fascia iliaca  Laterality: right  Injection technique: single-shot  Guidance: ultrasound guided  Local infiltration: ropivacaine  Infiltration strength: 0.3 %  Dose: 30 mL  Needle  Needle type: pencil-tip   Needle gauge: 21 G  Needle length: 10 cm  Needle localization: ultrasound guidance  Assessment  Injection assessment: negative aspiration for heme, no paresthesia on injection, incremental injection and local visualized surrounding nerve on ultrasound  Paresthesia pain: none  Heart rate change: no  Slow fractionated injection: yes

## 2024-02-27 NOTE — OP NOTE
Operative Note      Alexis Yap   2/27/2024      Preoperative Diagnosis: Right Hip Osteoarthritis      Postoperative Diagnosis: Same      RightProcedure(s):  Arthroplasty Total Hip Posterior Approach (DEPUY , S-ROM AVAILABLE ON BACKUP) ** Rapid Recovery ** - Wound Class: Class I/ Clean - Incision Closure: Deep and Superficial Layers      Surgeon:   Blanca Morel MD       [facility]   [facility]      Anesthesia Staff:   Anesthesiologist: Rufus Caceres MD  CRNA: NOELLE Luciano-CRNA      Anesthesia Type:    * No anesthesia type entered *      Specimens:   No specimens collected      Estimated Blood Loss:   300 mL      Implants:   Implant Name Type Inv. Item Serial No.  Lot No. LRB No. Used Action   EMPHASYS ACETABULAR SHELL THREE-HOLE 52MM CEMENTLESS    DEPUY SYNTHES [id number] Right 1 Implanted   SCREW CANCELLOUS 6.5 X 25 - [id number] Screw SCREW CANCELLOUS 6.5 X 25  DEPUY [id number] Right 1 Implanted   EMPHASYS POLYETHYLENE LINER AOX NEUTRAL 52MM 40MM    DEPUY SYNTHES [id number] Right 1 Implanted   STEM, FEMORAL, ACTIS COLLAR, STD, SIZE 3 - [id number] Joint Hip STEM, FEMORAL, ACTIS COLLAR, STD, SIZE 3  DEPUY [id number] Right 1 Implanted   40 MM, +5.0 BIOLOX DELTA 12/14 TS TAPER CERAMIC FEMORAL HEAD     [id number] Right 1 Implanted         Findings: End-stage osteoarthritis right hip     Clinical History:   The patient presents with severe osteoarthritis of the hip.  The patient has failed conservative management. All options were discussed in detail with the patient and the patient has decided that they would like to proceed with total hip replacement.      Description of procedure:   Informed consent was obtained. The operative site was marked. The patient was transferred to the operating room. A spinal anesthetic was performed. A SIFI block was performed. The patient received preoperative antibiotics and tranexamic acid.  The patient was positioned  in the lateral position with the operative side up.      The surgical site was prepped and draped in standard sterile fashion. A timeout was performed prior to skin incision.  A longitudinal incision approximately 25 cm in length was centered over the posterior corner of the greater trochanter. Soft tissues were dissected sharply down to the iliotibial band and fascia of gluteus zeke. These were both divided in the direction of their fibers exposing the lateral aspect of the femur and the short external rotators. The piriformis and conjoined tendons were released from the posterior aspect of the femur and tagged with #2 Ethibond sutures for later repair. This exposed the posterior capsule. The gluteus minimus muscle belly was elevated off the capsule giving us greater exposure and a posterior capsulotomy was performed. This was also tagged with #2 Ethibond sutures for later repair.      The hip was then dislocated.  A neck cut was made as templated.      Attention was turned to the femur. Retractors were placed around the proximal femur for exposure. Remaining soft tissue was removed from the piriformis fossa. A box osteotome was used to open the femoral canal. This was followed by femoral canal finding reamer. The canal was then prepared to the pre-templated appropriate size. A trial standard offset neck and head were put in place and the hip was reduced. The hip was taken through range of motion and was stable. Femoral neck resection length and LTC were measured and were according to preoperative plan. A flat-plate xray was obtained, confirming satisfactory placement of acetabular component, broach size and restoration of leg length and offset.      Attention was turned to the acetabulum. Retractors were placed around the acetabulum for exposure. Labrum was excised. Sequential reaming was then performed to 51 mm using anatomic landmarks. Significant osteophytosis was present. The reamer was removed and the  acetabulum was exposed. I was happy with the bony bed for the acetabulum. The planned 52 mm acetabular component was then placed on the cup  and impacted into place in appropriate inclination and anteversion as per bony landmarks, the transverse acetabular ligament and inferior acetabulum, and alignment . A screw was placed for supplemental fixation. A neutral liner was then placed. The locking mechanism was tested and was intact. Osteophytes were then removed from around the rim of the bony acetabulum.      Acetabular retractors were removed, and a sponge was placed in the acetabulum.      The hip was dislocated. The trial implant was removed. The size 5 standard offset stem was impacted into place. A trial head was placed and LTC was measured and according to preoperative plan. A trial reduction was once again performed. Final leg length and offset manipulation was performed through choice of neck lengths on our femoral heads. The hip was once again dislocated. The trial head was removed. The trunnion was irrigated and dried and our final 40+5 mm head was impacted into place. The acetabulum was irrigated and suctioned removing all debris and the hip was reduced. I was happy with the leg length and offset as well as overall hip stability.        A single drill hole in the center of the greater trochanter just proximal to the shoulder of the stem was made.  The inferior capsular stitch was passed through here and tied to the superior capsular stitch which was passed through the insertion of the abductor tendon.  Next, a #2 Ethibond suture was used to side-to-side repair the fascia of the gluteus minimus to the superior capsule.  Finally, a free needle was used to repair the short external rotator tendons via horizontal mattress through the abductor tendon using #2 Ethibond suture.  The hip joint was then irrigated with Irrisept followed by saline.    Leslee-articular soft tissues were injected with a  combination of Marcaine with epinephrine.      Iliotibial band and fascia gluteus zeke were repaired with a few # 2 ethibond and then #0 Vicryl sutures and #1 stratafix. Subcutaneous tissue was closed in layers with 0 Vicryl followed by 3-0 Vicryl and skin was closed with 3-0 stratafix and Dermabond. Sterile dressings were applied. There were no complications. Counts were correct at the end of the procedure. A surgical debrief was performed. The patient was transferred to the recovery room in stable condition.      A 22 modifier is being added to this case for increased complexity secondary to patient BMI >35.  This led to increased surgical time, need for additional help in the operating room, and additional retractor use as compared to a standard arthroplasty procedure.      Post-operative Plan:   The patient will be allowed to weight-bear as tolerated. They will receive perioperative antibiotics and be started on DVT prophylaxis in the form of aspirin and compression devices. An x-ray will be performed in the recovery room. They will follow posterior hip precautions.      Attestation Statement:   Dr. Morel was present for and performed all critical portions of the procedure.         Blanca Morel MD       Date: 2/27/2024  Time: 1:11 PM      This office note was transcribed with dictation software. ?Please excuse any typographical errors, program misunderstandings leading to inadvertent insertions or deletions of inappropriate wording, pronoun errors and other unintentional transcription errors not noticed on proof-reading.

## 2024-02-27 NOTE — PROGRESS NOTES
02/27/24 1521   Discharge Planning   Living Arrangements Spouse/significant other   Support Systems Spouse/significant other   Assistance Needed Ambulation/Transfers   Do you have animals or pets at home? No   Who is requesting discharge planning? Provider   Home or Post Acute Services In home services   Type of Home Care Services Home PT;Home OT   Patient expects to be discharged to: home with spouse   Does the patient need discharge transport arranged? No   Financial Resource Strain   How hard is it for you to pay for the very basics like food, housing, medical care, and heating? Not hard   Housing Stability   In the last 12 months, was there a time when you were not able to pay the mortgage or rent on time? N   In the last 12 months, how many places have you lived? 1   In the last 12 months, was there a time when you did not have a steady place to sleep or slept in a shelter (including now)? N   Transportation Needs   In the past 12 months, has lack of transportation kept you from medical appointments or from getting medications? no   In the past 12 months, has lack of transportation kept you from meetings, work, or from getting things needed for daily living? No   Patient Choice   Provider Choice list and CMS website (https://medicare.gov/care-compare#search) for post-acute Quality and Resource Measure Data were provided and reviewed with: Patient   Patient / Family choosing to utilize agency / facility established prior to hospitalization Yes     63 yr old female admitted RR following right hip replacement with Dr. Morel.  Plan is home today with [facility] PT/OT. SOC date within 24-48 hrs.  Confirmed post op follow up on March 11, 2024 at 10:20 am-[place]

## 2024-02-27 NOTE — NURSING NOTE
Patient is now an observation patient moved from rapid recovery   Vitals completed   Dinner being ordered   Call light within reach

## 2024-02-27 NOTE — CARE PLAN
Problem: Balance  Goal: LTG - Patient will maintain standing and sitting balance to allow for completion of daily activities  Outcome: Progressing  Goal: STG - Maintains static standing balance without upper extremity support  Description: INTERVENTIONS:  1. Practice standing with minimal support.  2. Educate patient about maintaining total hip precautions while maintaining balance.  3. Educate patient about standing tolerance.  4. Educate patient about independence with gait, transfers, and ADL's.  5. Educate patient about use of assistive device.  6. Educate patient about self-directed care.  Outcome: Progressing  Note: With distant supervision     Problem: Mobility  Goal: LTG - Patient will ambulate community distance  Outcome: Progressing  Note: With RW at Dsx1   Goal: LTG - Patient will navigate 4-6 steps with rails/device  Outcome: Progressing  Note: At Dsx1 with LRAD     Problem: Safety  Goal: LTG - Patient will adhere to hip precautions during ADL's and transfers  Outcome: Progressing     Problem: Transfers  Goal: LTG - Patient will demonstrate safe transfer techniques  Outcome: Progressing     Problem: Pain  Goal: LTG - Patient will manage pain with the appropriate technique/Intervention  Outcome: Progressing  Goal: Goal 1  Outcome: Progressing  Note: Patient pain level will be 4/10 or lower      Problem: Compromised Skin Integrity  Goal: LTG - Patient will be free from infection  Outcome: Progressing

## 2024-02-27 NOTE — ANESTHESIA PROCEDURE NOTES
Spinal Block    Patient location during procedure: OR  Start time: 2/27/2024 10:27 AM  End time: 2/27/2024 10:29 AM  Reason for block: primary anesthetic, procedure for pain, at surgeon's request and post-op pain management  Staffing  Performed: CRNA   Authorized by: Rufus Caceres MD    Performed by: AUDRA Luciano    Preanesthetic Checklist  Completed: patient identified, IV checked, risks and benefits discussed, surgical consent, pre-op evaluation, timeout performed and sterile techniques followed  Block Timeout  RN/Licensed healthcare professional reads aloud to the Anesthesia provider and entire team: Patient identity, procedure with side and site, patient position, and as applicable the availability of implants/special equipment/special requirements.  Patient on coagulant treatment: no  Timeout performed at: 2/27/2024 10:27 AM  Spinal Block  Patient position: sitting  Prep: Betadine  Sterility prep: drape, gloves, gown, hand hygiene and mask  Sedation level: light sedation  Patient monitoring: blood pressure, continuous pulse oximetry, EKG, ETCO2 and heart rate  Approach: midline  Vertebral space: L4-5  Injection technique: single-shot  Needle  Needle gauge: 24 G  Free flowing CSF: yes    Assessment  Block outcome: patient comfortable  Procedure assessment: patient tolerated procedure well with no immediate complications

## 2024-02-27 NOTE — PROGRESS NOTES
Physical Therapy    Physical Therapy Evaluation & Treatment    Patient Name: Alexis Yap  MRN: [id number]  Today's Date: 2/27/2024   Time Calculation  Start Time: 1515  Stop Time: 1540  Time Calculation (min): 25 min    Assessment/Plan   PT Assessment  PT Assessment Results: Decreased strength, Decreased endurance, Impaired balance, Decreased mobility, Decreased coordination, Decreased safety awareness, Orthopedic restrictions, Pain  Rehab Prognosis: Good  Barriers to Discharge: pain management; hypotension  Evaluation/Treatment Tolerance: Treatment limited secondary to medical complications (Comment) (limited by symptomatic hypotension/nausea)  Medical Staff Made Aware: Yes  Strengths: Ability to acquire knowledge, Access to adaptive/assistive products, Attitude of self, Capable of completing ADLs semi/independent, Coping skills, Insight into problems, Premorbid level of function, Rehab experience, Support of Caregivers  Barriers to Participation: Comorbidities, Housing layout  End of Session Communication: Bedside nurse, Physician  Assessment Comment: PT eval moderate with evolving status and extensive PMhx. Pt presenting to eval with deficits in functional mobility, impaired endurance for tasks, symptomatic hypotension with standing/sitting with episodes of nausea, and increased pain in R hip. Pt understanding of R posterior hip precautions and hand out provided at this time. HEP not discussed due to hypotension and limitations with therapy at this time. PT recommends HHCP T with 24 hour supervision and use of RW. second session needed prior to DC home.  End of Session Patient Position: Bed, 3 rail up (RN notified of status, caregivers present, ice on R hip, call bell in reach, legs elevated, supine)   IP OR SWING BED PT PLAN  Inpatient or Swing Bed: Inpatient  PT Plan  Treatment/Interventions: Bed mobility, Transfer training, Endurance training, Positioning  PT Plan: Skilled PT  PT Frequency: BID  PT Discharge  Recommendations: Low intensity level of continued care  Equipment Recommended upon Discharge: Wheeled walker  PT Recommended Transfer Status: Assist x1, Assistive device  PT - OK to Discharge:  (full mobility assessment needed prior to DC. not safe to DC this date.)      Subjective     General Visit Information:  General  Reason for Referral: [place] [facility] [place] R posterior THR by Dr. Morel.  Past Medical History Relevant to Rehab: OA, anxiety, gastritis, HTN, dizziness, edema, H pylori infection, insomnia, hemorrhoids, IBS, obesity, OAB, Vitamin B and D, hypothyroidism, migraine, DM, UTI, bladder surgery, galbladder surgery, gerd, hysterectomy, THR  Family/Caregiver Present: Yes  Prior to Session Communication: Bedside nurse  Patient Position Received: Bed, 3 rail up  General Comment: cleared by RN and agreeable to session.  Home Living:  Home Living  Type of Home: House  Lives With: Spouse  Home Adaptive Equipment: Walker rolling or standard, Cane  Home Layout: Multi-level, Bed/bath upstairs, Stairs to alternate level with rails  Alternate Level Stairs-Number of Steps: 12 steps to bedroom/bathroom with railing  Home Access: Stairs to enter with rails  Entrance Stairs-Number of Steps: 3 steps into home with railing  Home Living Comments: will have cousin staying for a few days and will have  on weekends to assist  Prior Level of Function:  Prior Function Per Pt/Caregiver Report  Level of [place]: Independent with ADLs and functional transfers  ADL Assistance: Independent  Homemaking Assistance: Independent  Ambulatory Assistance: Independent  Vocational: Retired  Prior Function Comments: no falls within last6 months  Precautions:  Precautions  LE Weight Bearing Status: Weight Bearing as Tolerated  Medical Precautions: Fall precautions  Post-Surgical Precautions: Right hip precautions  Precautions Comment: posterior hip precautions  Vital Signs:  Vital Signs  Heart Rate: 62  Heart  Rate Source: Monitor  SpO2: 94 %  BP: 106/50 (reporting dizziness emerging and nausea worsening)  BP Method: Automatic  Patient Position: Sitting    Objective   Pain:  Pain Assessment  Pain Assessment: Barreto-Baker FACES  Barreto-Baker FACES Pain Rating: Hurts even more  Pain Type: Surgical pain  Pain Location: Hip  Pain Orientation: Right  Pain Radiating Towards: low back  Pain Descriptors: Aching  Clinical Progression: Gradually worsening  Pain Interventions: Cold applied, Rest, Repositioned, Emotional support  Response to Interventions: pt continuing to have discomfort however back pain has improved; pt is hypotensive and cannot safely sit up right now  Cognition:  Cognition  Overall Cognitive Status: Within Functional Limits    General Assessments:  General Observation  General Observation: dressing dry and intact on R hip               Activity Tolerance  Endurance: Other (Comment) (currently, hypotension is impacting moblity and ability to participate further wit htherapy)    Sensation  Light Touch: No apparent deficits       Coordination  Movements are Fluid and Coordinated: No  Lower Body Coordination: impaired from post op limitations    Postural Control  Postural Control: Within Functional Limits    Static Sitting Balance  Static Sitting-Balance Support: Bilateral upper extremity supported, Feet supported  Static Sitting-Level of Assistance: Close supervision  Dynamic Sitting Balance  Dynamic Sitting-Balance Support: Bilateral upper extremity supported, Feet supported  Dynamic Sitting-Comments: close supervision    Static Standing Balance  Static Standing-Balance Support: Bilateral upper extremity supported  Static Standing-Level of Assistance: Minimum assistance  Static Standing-Comment/Number of Minutes: with RW;  cueing to maintain eyes open  Dynamic Standing Balance  Dynamic Standing-Balance Support:  (not tested due to  hypotension)  Functional Assessments:  Bed Mobility  Bed Mobility: Yes  Bed Mobility  "1  Bed Mobility 1: Supine to sitting, Sitting to supine, Scooting  Level of Assistance 1: Moderate assistance, Moderate verbal cues  Bed Mobility Comments 1: supine to sitting: PT assisted with min-mod A x1 for trunk assistance to long sit as pt scooted legs to EOB with min A x1.  Pt able to scoot hips forward with CGx1 for trunk stabilization to prevent retropulsion but pt reporting nausea and some \"fogginess\" feeling. SITTING TO SUPINE: Pt  required mod A x1 for trunk assist, mod A x1 for BLE lift kobe b ed and then pt was dependently scooted to HOB with sheet x2 due to feeling faint and symptomatic hypotension.    Transfers  Transfer: Yes  Transfer 1  Technique 1: Sit to stand, Stand to sit  Transfer Device 1: Walker  Transfer Level of Assistance 1: Minimum assistance, Moderate verbal cues  Trials/Comments 1: pt transferred sit to stand<>stand to sit min a x2 for activity and min A x1 for stabilization with Rw once upright. pt had no buckle however anterior/posterior sway noted with EO/EC pattern. Pt cued to maintain eyes open and report if dizziness is occuring. BP taken standing however machine error from low pressure reading could not provide levels. Prior to standing BP, pt sitting on EOB with BP of 106/50 mmHg and HR 62 bpm.    Ambulation/Gait Training  Ambulation/Gait Training Performed: No (not medically appropriate at this time from hypotension)  Extremity/Trunk Assessments:  RUE   RUE : Within Functional Limits  LUE   LUE: Within Functional Limits  RLE   RLE : Within Functional Limits  LLE   LLE : Within Functional Limits  Treatments:    Will introduce HEP at next session when medically appropriate.   Outcome Measures:  [facility] Basic Mobility  Turning from your back to your side while in a flat bed without using bedrails: A little  Moving from lying on your back to sitting on the side of a flat bed without using bedrails: A little  Moving to and from bed to chair (including a wheelchair): A little  Standing " up from a chair using your arms (e.g. wheelchair or bedside chair): A little  To walk in hospital room: A little  Climbing 3-5 steps with railing: A little  Basic Mobility - Total Score: 18    Encounter Problems       Encounter Problems (Active)       Balance       LTG - Patient will maintain standing and sitting balance to allow for completion of daily activities (Progressing)       Start:  02/27/24            STG - Maintains static standing balance without upper extremity support (Progressing)       Start:  02/27/24       INTERVENTIONS:  1. Practice standing with minimal support.  2. Educate patient about maintaining total hip precautions while maintaining balance.  3. Educate patient about standing tolerance.  4. Educate patient about independence with gait, transfers, and ADL's.  5. Educate patient about use of assistive device.  6. Educate patient about self-directed care.      Goal Note       With distant supervision                 Compromised Skin Integrity       LTG - Patient will be free from infection (Progressing)       Start:  02/27/24               Mobility       LTG - Patient will ambulate community distance (Progressing)       Start:  02/27/24         Goal Note       With RW at Dsx1               LTG - Patient will navigate 4-6 steps with rails/device (Progressing)       Start:  02/27/24         Goal Note       At Dsx1 with LRAD                 Pain       Goal 1 (Progressing)       Start:  02/27/24         Goal Note       Patient pain level will be 4/10 or lower                  Safety       LTG - Patient will adhere to hip precautions during ADL's and transfers (Progressing)       Start:  02/27/24               Transfers       LTG - Patient will demonstrate safe transfer techniques (Progressing)       Start:  02/27/24                   Education Documentation  Handouts, taught by Elise Dunham PT at 2/27/2024  4:10 PM.  Learner: Significant Other, Family, Patient  Readiness: Acceptance  Method:  Explanation, Handout  Response: Verbalizes Understanding, Demonstrated Understanding  Comment: will need reinforcement once medical symptoms resolve    Precautions, taught by Elise Dunham PT at 2/27/2024  4:10 PM.  Learner: Significant Other, Family, Patient  Readiness: Acceptance  Method: Explanation, Handout  Response: Verbalizes Understanding, Demonstrated Understanding  Comment: will need reinforcement once medical symptoms resolve    Body Mechanics, taught by Elise Dunham PT at 2/27/2024  4:10 PM.  Learner: Significant Other, Family, Patient  Readiness: Acceptance  Method: Explanation, Handout  Response: Verbalizes Understanding, Demonstrated Understanding  Comment: will need reinforcement once medical symptoms resolve    Mobility Training, taught by Elies Dunham PT at 2/27/2024  4:10 PM.  Learner: Significant Other, Family, Patient  Readiness: Acceptance  Method: Explanation, Handout  Response: Verbalizes Understanding, Demonstrated Understanding  Comment: will need reinforcement once medical symptoms resolve    Education Comments  No comments found.    Elise Dunham, PT, DPT

## 2024-02-27 NOTE — HH CARE COORDINATION
Home Care received a Referral for Physical Therapy and Occupational Therapy. We have processed the referral for a Start of Care on 02/28-02/29.     If you have any questions or concerns, please feel free to contact us at [phone]. Follow the prompts, enter your five digit zip code, and you will be directed to your care team on [facility].

## 2024-02-27 NOTE — ANESTHESIA PREPROCEDURE EVALUATION
Patient: Alexis Yap    Procedure Information       Date/Time: 02/27/24 1030    Procedure: Arthroplasty Total Hip Posterior Approach (DEPUY , S-ROM AVAILABLE ON BACKUP) ** Rapid Recovery ** (Right: Hip)    Location: [facility] / [facility]    Surgeons: Blanca Morel MD            Relevant Problems   Cardiovascular   (+) Abnormal EKG   (+) HTN (hypertension)   (+) Hypercholesteremia      Endocrine   (+) Hypothyroidism   (+) Morbid obesity (CMS/HCC)      GI   (+) Esophageal reflux   (+) Irritable bowel syndrome with diarrhea   (+) Rectal bleeding      /Renal   (+) Urinary tract infection      Neuro/Psych   (+) Anxiety      Pulmonary   (+) Lung nodules      Musculoskeletal   (+) Knee osteoarthritis   (+) Primary osteoarthritis of both hips   (+) Unilateral primary osteoarthritis, right hip      Infectious Disease   (+) Candidiasis   (+) Helicobacter pylori (H. pylori) infection   (+) Urinary tract infection      Other   (+) Arthritis of both knees   (+) Hip arthritis       Clinical information reviewed:   Tobacco  Allergies  Meds   Med Hx  Surg Hx   Fam Hx  Soc Hx        NPO Detail:  NPO/Void Status  Carbohydrate Drink Given Prior to Surgery? : N  Date of Last Liquid: 02/26/24  Time of Last Liquid: 2100  Date of Last Solid: 02/26/24  Time of Last Solid: 2100  Last Intake Type: Clear fluids  Time of Last Void: 0830         PHYSICAL EXAM    Anesthesia Plan    History of general anesthesia?: yes  History of complications of general anesthesia?: no    ASA 3     regional and spinal     intravenous induction   Anesthetic plan and risks discussed with patient.    Plan discussed with attending and CRNA.

## 2024-02-27 NOTE — INTERVAL H&P NOTE
History and Physical Update:  I have reviewed the history and physical dated 2/15/24. History and physical reviewed and relevant findings noted.     Patient examined to review pertinent physical findings: No significant changes.   Home medications reviewed: No significant changes.   Allergies reviewed: No significant changes.     ERAS (Enhanced recovery after surgery): yes, ULYSSES, posterior     Blanca Morel MD    Date: 2/27/2024  Time: 9:24 AM        Patient Active Problem List   Diagnosis    Trigger ring finger of right hand    Melanocytic nevi of right upper limb, including shoulder    Melanocytic nevi of left upper limb, including shoulder    Abdominal bloating    Abdominal pain, epigastric    Abnormal EKG    Age-related nuclear cataract of both eyes    Anxiety    Arthritis of both knees    Bilateral presbyopia    Bile reflux gastritis    Borderline hypertension    Dizziness    Edema    Esophageal reflux    Feeling weak    Groin pain    Heartburn    Helicobacter pylori (H. pylori) infection    Hip arthritis    History of arthroplasty of left hip    History of total hip replacement    Hypercholesteremia    Hyperopia of both eyes    Insomnia    Internal hemorrhoids    Irritable bowel syndrome with diarrhea    Hip pain    Knee pain    Lower urinary tract symptoms (LUTS)    Lung nodules    Melanocytic nevi of trunk    Menopausal symptoms    Morbid obesity (CMS/HCC)    Multiple acquired skin tags    Neoplasm of uncertain behavior of skin    OAB (overactive bladder)    Other hypertrophic disorders of the skin    Patellofemoral syndrome, right    Plica syndrome, right knee    Pre-diabetes    Primary osteoarthritis of both hips    Pruritus of vagina    Rectal bleeding    Skin changes due to chronic exposure to nonionizing radiation, unspecified    Knee osteoarthritis    Tear of meniscus, current    Urine frequency    Vitamin B deficiency    Vitamin D deficiency    Weight loss    HTN (hypertension)    Hypothyroidism     Unilateral primary osteoarthritis, right hip    Acquired hallux rigidus    Acute bronchitis    Acute sinusitis    Candidiasis    Cough    Diabetes mellitus (CMS/HCC)    Diarrhea    History of hypothyroidism    History of migraine    Nausea    Neck pain    Nuclear senile cataract    Obesity with body mass index 30 or greater    Pain of hand    Patellofemoral pain syndrome    Solitary pulmonary nodule    Urinary tract infection       Current Outpatient Medications   Medication Instructions    acetaminophen (Tylenol) 325 mg tablet 2 tablets, oral, Every 8 hours PRN    acetaminophen (TYLENOL) 1,000 mg, oral, Every 8 hours    ALPRAZolam (XANAX) 0.5 mg, oral, Daily PRN    aspirin 81 mg EC tablet 1 tablet, oral, Daily    aspirin 81 mg, oral, 2 times daily    buPROPion XL (WELLBUTRIN XL) 300 mg, oral, Every morning, Do not crush, chew, or split.    cefadroxil (DURICEF) 500 mg, oral, 2 times daily    chlorhexidine (Peridex) 0.12 % solution 15 mL, Mouth/Throat, As needed    cholecalciferol (VITAMIN D-3) 125 mcg, oral, Daily    docusate sodium (COLACE) 100 mg, oral, 2 times daily    glucosamine-chondroitin 500-400 mg tablet 2 tablets, oral, Daily    hydroCHLOROthiazide (HYDRODIURIL) 25 mg, oral, Daily    ibuprofen 800 mg, oral, 3 times daily PRN    levothyroxine (SYNTHROID, LEVOXYL) 125 mcg, oral, Daily    magnesium oxide (MAG-OX) 400 mg, Daily    meloxicam (MOBIC) 15 mg, oral, Daily    metoprolol tartrate (Lopressor) 50 mg tablet TAKE 1 TABLET TWICE A DAY (NEED APPOINTMENT, LAST FILL)    naproxen (NAPROSYN) 500 mg, oral, 2 times daily PRN    ondansetron (ZOFRAN) 4 mg, oral, Every 8 hours PRN    oxyCODONE (ROXICODONE) 5-10 mg, oral, Every 6 hours PRN    pantoprazole (PROTONIX) 40 mg, oral, Daily before breakfast, Do not crush, chew, or split.    potassium chloride CR (Klor-Con M20) 20 mEq ER tablet TAKE 1 TABLET DAILY    rosuvastatin (Crestor) 10 mg tablet 1 tablet, oral, Every other day, Takes at night    saccharomyces  "boulardii (FLORASTOR) 250 mg, oral, Daily    sennosides (SENOKOT) 8.6 mg, oral, Daily    traMADol (ULTRAM)  mg, oral, Every 6 hours PRN    TURMERIC ORAL 1 capsule, oral, Daily       Allergies   Allergen Reactions    Butalbital-Acetaminop-Caf-Cod Shortness of breath     severe stomach pain-came into the ED  Oct 17 pt states he did go to ED but felt SOB and could not catch her breath    Butalbital-Acetaminophen-Caff Shortness of breath    Codeine Shortness of breath    Anaprox [Naproxen Sodium] Rash       Lab Results   Component Value Date    WBC 6.8 02/15/2024    HGB 14.7 02/15/2024    HCT 42.8 02/15/2024    MCV 89 02/15/2024     02/15/2024     Lab Results   Component Value Date    INR 0.9 02/15/2024    PROTIME 9.2 (L) 02/15/2024       Lab Results   Component Value Date    GLUCOSE 99 02/15/2024    CALCIUM 9.8 02/15/2024     02/15/2024    K 3.9 02/15/2024    CO2 29 02/15/2024     02/15/2024    BUN 23 02/15/2024    CREATININE 0.70 02/15/2024     No results found for: \"CKTOTAL\", \"CKMB\", \"CKMBINDEX\", \"TROPONINI\"  Lab Results   Component Value Date    HGBA1C 6.9 (H) 02/15/2024       No results found for: \"CRP\"  Lab Results   Component Value Date    SEDRATE 6 10/28/2022       "

## 2024-02-27 NOTE — ANESTHESIA POSTPROCEDURE EVALUATION
Patient: Alexis Yap    Procedure Summary       Date: 02/27/24 Room / Location: [facility] / [facility]    Anesthesia Start: 1021 Anesthesia Stop: 1300    Procedure: Arthroplasty Total Hip Posterior Approach (DEPUY , S-ROM AVAILABLE ON BACKUP) ** Rapid Recovery ** (Right: Hip) Diagnosis:       Unilateral primary osteoarthritis, right hip      (Unilateral primary osteoarthritis, right hip [M16.11])    Surgeons: Blanca Morel MD Responsible Provider: Rufus Caceres MD    Anesthesia Type: regional, spinal ASA Status: 3            Anesthesia Type: regional, spinal    Vitals Value Taken Time   /81 02/27/24 1300   Temp 36 02/27/24 1300   Pulse 69 02/27/24 1300   Resp 14 02/27/24 1300   SpO2 98 02/27/24 1300       Anesthesia Post Evaluation    Patient location during evaluation: PACU  Patient participation: complete - patient participated  Level of consciousness: awake  Pain score: 0  Pain management: adequate  Airway patency: patent  Cardiovascular status: acceptable  Respiratory status: acceptable  Hydration status: acceptable  Postoperative Nausea and Vomiting: none        There were no known notable events for this encounter.

## 2024-02-28 VITALS
TEMPERATURE: 97.9 F | WEIGHT: 237.44 LBS | HEART RATE: 79 BPM | SYSTOLIC BLOOD PRESSURE: 135 MMHG | RESPIRATION RATE: 16 BRPM | HEIGHT: 66 IN | OXYGEN SATURATION: 97 % | DIASTOLIC BLOOD PRESSURE: 52 MMHG | BODY MASS INDEX: 38.16 KG/M2

## 2024-02-28 LAB
ANION GAP SERPL CALC-SCNC: 16 MMOL/L (ref 10–20)
BUN SERPL-MCNC: 17 MG/DL (ref 6–23)
CALCIUM SERPL-MCNC: 8.4 MG/DL (ref 8.6–10.3)
CHLORIDE SERPL-SCNC: 104 MMOL/L (ref 98–107)
CO2 SERPL-SCNC: 21 MMOL/L (ref 21–32)
CREAT SERPL-MCNC: 0.71 MG/DL (ref 0.5–1.05)
EGFRCR SERPLBLD CKD-EPI 2021: >90 ML/MIN/1.73M*2
ERYTHROCYTE [DISTWIDTH] IN BLOOD BY AUTOMATED COUNT: 12.1 % (ref 11.5–14.5)
GLUCOSE SERPL-MCNC: 211 MG/DL (ref 74–99)
HCT VFR BLD AUTO: 33.4 % (ref 36–46)
HGB BLD-MCNC: 11.5 G/DL (ref 12–16)
MCH RBC QN AUTO: 30.7 PG (ref 26–34)
MCHC RBC AUTO-ENTMCNC: 34.4 G/DL (ref 32–36)
MCV RBC AUTO: 89 FL (ref 80–100)
NRBC BLD-RTO: ABNORMAL /100{WBCS}
PLATELET # BLD AUTO: 245 X10*3/UL (ref 150–450)
POTASSIUM SERPL-SCNC: 3.7 MMOL/L (ref 3.5–5.3)
RBC # BLD AUTO: 3.75 X10*6/UL (ref 4–5.2)
SODIUM SERPL-SCNC: 137 MMOL/L (ref 136–145)
WBC # BLD AUTO: 13.2 X10*3/UL (ref 4.4–11.3)

## 2024-02-28 PROCEDURE — 2500000001 HC RX 250 WO HCPCS SELF ADMINISTERED DRUGS (ALT 637 FOR MEDICARE OP): Performed by: PHYSICIAN ASSISTANT

## 2024-02-28 PROCEDURE — 2500000004 HC RX 250 GENERAL PHARMACY W/ HCPCS (ALT 636 FOR OP/ED): Performed by: PHYSICIAN ASSISTANT

## 2024-02-28 PROCEDURE — 97116 GAIT TRAINING THERAPY: CPT | Mod: GP

## 2024-02-28 PROCEDURE — 96376 TX/PRO/DX INJ SAME DRUG ADON: CPT | Performed by: STUDENT IN AN ORGANIZED HEALTH CARE EDUCATION/TRAINING PROGRAM

## 2024-02-28 PROCEDURE — 99024 POSTOP FOLLOW-UP VISIT: CPT | Performed by: STUDENT IN AN ORGANIZED HEALTH CARE EDUCATION/TRAINING PROGRAM

## 2024-02-28 PROCEDURE — 2500000004 HC RX 250 GENERAL PHARMACY W/ HCPCS (ALT 636 FOR OP/ED)

## 2024-02-28 PROCEDURE — 97110 THERAPEUTIC EXERCISES: CPT | Mod: GP

## 2024-02-28 PROCEDURE — 97530 THERAPEUTIC ACTIVITIES: CPT | Mod: GP

## 2024-02-28 PROCEDURE — 2500000002 HC RX 250 W HCPCS SELF ADMINISTERED DRUGS (ALT 637 FOR MEDICARE OP, ALT 636 FOR OP/ED)

## 2024-02-28 PROCEDURE — 85027 COMPLETE CBC AUTOMATED: CPT

## 2024-02-28 PROCEDURE — 2500000001 HC RX 250 WO HCPCS SELF ADMINISTERED DRUGS (ALT 637 FOR MEDICARE OP)

## 2024-02-28 PROCEDURE — G0378 HOSPITAL OBSERVATION PER HR: HCPCS

## 2024-02-28 PROCEDURE — 94762 N-INVAS EAR/PLS OXIMTRY CONT: CPT

## 2024-02-28 PROCEDURE — 82374 ASSAY BLOOD CARBON DIOXIDE: CPT | Performed by: PHYSICIAN ASSISTANT

## 2024-02-28 PROCEDURE — 36415 COLL VENOUS BLD VENIPUNCTURE: CPT

## 2024-02-28 RX ADMIN — ASPIRIN 81 MG: 81 TABLET, COATED ORAL at 08:40

## 2024-02-28 RX ADMIN — LEVOTHYROXINE SODIUM 125 MCG: 0.12 TABLET ORAL at 06:08

## 2024-02-28 RX ADMIN — ACETAMINOPHEN 650 MG: 325 TABLET ORAL at 00:00

## 2024-02-28 RX ADMIN — KETOROLAC TROMETHAMINE 15 MG: 15 INJECTION, SOLUTION INTRAMUSCULAR; INTRAVENOUS at 08:41

## 2024-02-28 RX ADMIN — BUPROPION HYDROCHLORIDE 300 MG: 150 TABLET, EXTENDED RELEASE ORAL at 08:41

## 2024-02-28 RX ADMIN — KETOROLAC TROMETHAMINE 15 MG: 15 INJECTION, SOLUTION INTRAMUSCULAR; INTRAVENOUS at 02:35

## 2024-02-28 RX ADMIN — METOPROLOL TARTRATE 50 MG: 50 TABLET ORAL at 08:40

## 2024-02-28 RX ADMIN — ACETAMINOPHEN 650 MG: 325 TABLET ORAL at 06:08

## 2024-02-28 RX ADMIN — CEFAZOLIN SODIUM 2 G: 2 INJECTION, SOLUTION INTRAVENOUS at 02:35

## 2024-02-28 RX ADMIN — PANTOPRAZOLE SODIUM 40 MG: 40 TABLET, DELAYED RELEASE ORAL at 06:08

## 2024-02-28 RX ADMIN — Medication 400 MG: at 08:41

## 2024-02-28 ASSESSMENT — COGNITIVE AND FUNCTIONAL STATUS - GENERAL
WALKING IN HOSPITAL ROOM: A LITTLE
TURNING FROM BACK TO SIDE WHILE IN FLAT BAD: A LITTLE
DAILY ACTIVITIY SCORE: 23
CLIMB 3 TO 5 STEPS WITH RAILING: A LITTLE
MOVING FROM LYING ON BACK TO SITTING ON SIDE OF FLAT BED WITH BEDRAILS: A LITTLE
TOILETING: A LITTLE
MOVING TO AND FROM BED TO CHAIR: A LITTLE
MOBILITY SCORE: 24
MOBILITY SCORE: 18
PATIENT BASELINE BEDBOUND: NO
STANDING UP FROM CHAIR USING ARMS: A LITTLE

## 2024-02-28 ASSESSMENT — PAIN - FUNCTIONAL ASSESSMENT
PAIN_FUNCTIONAL_ASSESSMENT: 0-10

## 2024-02-28 ASSESSMENT — PAIN SCALES - GENERAL
PAINLEVEL_OUTOF10: 1
PAINLEVEL_OUTOF10: 0 - NO PAIN
PAINLEVEL_OUTOF10: 0 - NO PAIN

## 2024-02-28 NOTE — NURSING NOTE
1000am Pt tolerated PT without difficulty, received meds to bed. Pt awaiting transportation arrangements for dc home.

## 2024-02-28 NOTE — NURSING NOTE
Received report from day shift nurse herb. Went in to introduce myself to patient and let her know that I would be her nurse for the evening. Pt was with family and said okay.

## 2024-02-28 NOTE — PROGRESS NOTES
Physical Therapy    Physical Therapy Treatment    Patient Name: Alexis Yap  MRN: [id number]  Today's Date: 2/28/2024  Time Calculation  Start Time: 1040  Stop Time: 1118  Time Calculation (min): 38 min       Assessment/Plan   PT Assessment  PT Assessment Results: Decreased strength, Decreased mobility, Decreased endurance, Orthopedic restrictions, Obesity  Rehab Prognosis: Excellent  Barriers to Discharge: pain management; hypotension  Evaluation/Treatment Tolerance: Patient tolerated treatment well  Medical Staff Made Aware: Yes  Strengths: Ability to acquire knowledge, Access to adaptive/assistive products, Attitude of self, Capable of completing ADLs semi/independent, Coping skills, Housing layout, Insight into problems, Premorbid level of function, Rehab experience, Support of Caregivers  Barriers to Participation: Comorbidities  End of Session Communication: Bedside nurse  Assessment Comment: pt is progressing towards goals and showing improvement with mobility, however continues to have deficits with strength. endurance, and requires AD for safety at this time. Pt initiated to [facility] and completed well. PT recommends [facility] PT with assist as needed.  End of Session Patient Position: Up in chair (RN aware of location, ice on R hip, call bell in reach, PCA at chair side at PT exit.)  PT Plan  Inpatient/Swing Bed or Outpatient: Inpatient  PT Plan  Treatment/Interventions: Bed mobility, Transfer training, Gait training, Stair training, Strengthening, Endurance training, Therapeutic activity, Home exercise program, Positioning  PT Plan: Skilled PT  PT Frequency: BID  PT Discharge Recommendations: Low intensity level of continued care  Equipment Recommended upon Discharge: Wheeled walker, Straight cane  PT Recommended Transfer Status: Assistive device, Stand by assist  PT - OK to Discharge: Yes      General Visit Information:   PT  Visit  PT Received On: 02/28/24  Response to Previous Treatment: Patient with no  complaints from previous session.  General  Reason for Referral: R posterior THR  Referred By: Dr. Morel  Past Medical History Relevant to Rehab: OA, anxiety, gastritis, HTN, dizziness, edema, H pylori infection, insomnia, hemorrhoids, IBS, obesity, OAB, Vitamin B and D, hypothyroidism, migraine, DM, UTI, bladder surgery, galbladder surgery, gerd, hysterectomy, THR  Family/Caregiver Present: No  Prior to Session Communication: Bedside nurse  Patient Position Received: Bed, 3 rail up  General Comment: cleared by RN and agreeable to session. feeling much better today    Subjective   Precautions:  Precautions  LE Weight Bearing Status: Weight Bearing as Tolerated  Medical Precautions: Fall precautions  Post-Surgical Precautions: Right hip precautions  Precautions Comment: posterior hip precautions  Vital Signs:  Vital Signs  Heart Rate: 62  Heart Rate Source: Monitor  SpO2: 94 %  BP: 106/50 (reporting dizziness emerging and nausea worsening)  BP Method: Automatic  Patient Position: Sitting    Objective   Pain:  Pain Assessment  Pain Assessment: 0-10  Pain Score: 1  Barreto-Baker FACES Pain Rating: Hurts even more  Pain Type: Surgical pain  Pain Location: Hip  Pain Orientation: Right  Pain Radiating Towards: low back  Pain Descriptors: Aching  Clinical Progression: Gradually worsening  Pain Interventions: Cold applied, Repositioned, Elevated  Response to Interventions: continued therapy  Cognition:  Cognition  Overall Cognitive Status: Within Functional Limits  Postural Control:  Postural Control  Postural Control: Within Functional Limits  Static Sitting Balance  Static Sitting-Balance Support: No upper extremity supported, Feet supported  Static Sitting-Level of Assistance: Independent  Dynamic Sitting Balance  Dynamic Sitting-Balance Support: No upper extremity supported, Feet supported  Dynamic Sitting-Comments: independent  Static Standing Balance  Static Standing-Balance Support: No upper extremity  supported  Static Standing-Level of Assistance: Close supervision  Static Standing-Comment/Number of Minutes: with RW  Dynamic Standing Balance  Dynamic Standing-Balance Support: Bilateral upper extremity supported  Dynamic Standing-Comments: distant supervision with RW  Extremity/Trunk Assessments:  RUE   RUE : Within Functional Limits  LUE   LUE: Within Functional Limits  RLE   RLE : Within Functional Limits  LLE   LLE : Within Functional Limits  Activity Tolerance:  Activity Tolerance  Endurance: Other (Comment) (currently, hypotension is impacting moblity and ability to participate further wit htherapy)  Treatments:  Therapeutic Exercise  Therapeutic Exercise Performed: Yes  Therapeutic Exercise Activity 1: ankle pumps x10  Therapeutic Exercise Activity 2: quad sets x10  Therapeutic Exercise Activity 3: glute set x5  Therapeutic Exercise Activity 4: heel slides x10  Therapeutic Exercise Activity 5: SAQ x10  Therapeutic Exercise Activity 6: hip abduction x10    Therapeutic Activity  Therapeutic Activity Performed: Yes  Therapeutic Activity 1: pt toileted without assistance for hygiene or hand hygiene post urination. pt able to balance without UE support on sink./RW without buckle or LOB. PT provided DSx1 for balance safety but no hand son assist  Therapeutic Activity 2: pt dressed BLE with some assist to maintain hip rpecautions and BUE without assistance. pt able to stand without UE support to adjust clothing without LOB.    Bed Mobility  Bed Mobility: Yes  Bed Mobility 1  Bed Mobility 1: Supine to sitting, Long sit, Scooting  Level of Assistance 1: Close supervision  Bed Mobility Comments 1: no hands on assist needed; pt using bed rails for assistance    Ambulation/Gait Training  Ambulation/Gait Training Performed: Yes  Ambulation/Gait Training 1  Device 1: Rolling walker  Assistance 1: Distant supervision  Quality of Gait 1: Decreased step length, Forward flexed posture, Antalgic  Comments/Distance (ft) 1:  ambulated 110 feetx2 with RW and 10 feetx2 with RW at DSx1. reciprocal stepping without buckle or lOB. tolerating well. spo2 on room air 96%.  Transfers  Transfer: Yes  Transfer 1  Technique 1: Sit to stand, Stand to sit  Transfer Device 1: Walker  Transfer Level of Assistance 1: Distant supervision  Trials/Comments 1: completed multiple STS from bed, recliner, chair, and commode with and without grabbar/armrests for assist. DSx1 for all activity. no buckle or LOB.    Stairs  Stairs: Yes  Stairs  Device 1: Railing, Single point cane  Assistance 1: Close supervision  Comment/Number of Steps 1: completed 3 steps with railing and cane at CSx1, cueing for step to pattern and use of cane for stair navigation, no buckle or lOB.    Outcome Measures:  [facility] Basic Mobility  Turning from your back to your side while in a flat bed without using bedrails: None  Moving from lying on your back to sitting on the side of a flat bed without using bedrails: None  Moving to and from bed to chair (including a wheelchair): None  Standing up from a chair using your arms (e.g. wheelchair or bedside chair): None  To walk in hospital room: None  Climbing 3-5 steps with railing: None  Basic Mobility - Total Score: 24    Education Documentation  No documentation found.  Education Comments  No comments found.        OP EDUCATION:  Outpatient Education  Individual(s) Educated: Patient  Education Provided: Body Mechanics, Fall Risk, Home Exercise Program, POC, Post-Op Precautions, Posture  Diagnosis and Precautions: emphasized ice, mobility each hour to prevent edema/stifness from worsening, making sure to follow hip precautions ( hand out provided) , pullow between knees when sleeping  Equipment: Ice Pack  Risk and Benefits Discussed with Patient/Caregiver/Other: yes  Patient/Caregiver Demonstrated Understanding: yes  Plan of Care Discussed and Agreed Upon: yes  Patient Response to Education: Patient/Caregiver Verbalized Understanding of  Information, Patient/Caregiver Performed Return Demonstration of Exercises/Activities, Patient/Caregiver Asked Appropriate Questions    Encounter Problems       Encounter Problems (Active)       Balance       LTG - Patient will maintain standing and sitting balance to allow for completion of daily activities (Progressing)       Start:  02/27/24            STG - Maintains static standing balance without upper extremity support (Met)       Start:  02/27/24    Resolved:  02/28/24    INTERVENTIONS:  1. Practice standing with minimal support.  2. Educate patient about maintaining total hip precautions while maintaining balance.  3. Educate patient about standing tolerance.  4. Educate patient about independence with gait, transfers, and ADL's.  5. Educate patient about use of assistive device.  6. Educate patient about self-directed care.            Compromised Skin Integrity       LTG - Patient will be free from infection (Progressing)       Start:  02/27/24               Mobility       LTG - Patient will ambulate community distance (Met)       Start:  02/27/24    Resolved:  02/28/24         LTG - Patient will navigate 4-6 steps with rails/device (Progressing)       Start:  02/27/24               Pain       Goal 1 (Progressing)       Start:  02/27/24         Goal Note       Maintain a pain level of 4/10 or lower                    Safety       LTG - Patient will adhere to hip precautions during ADL's and transfers (Progressing)       Start:  02/27/24               Transfers       LTG - Patient will demonstrate safe transfer techniques (Progressing)       Start:  02/27/24               Elise Dunham, PT, DPT

## 2024-02-28 NOTE — PROGRESS NOTES
Medication Education     Medication education for Alexis Yap was provided to the patient  for the following medication(s):  Aspirin  Cefadroxil  Docusate  Meloxicam  Zofran  Oxycodone  Pantoprazole  Senna  Tramadol    Medication education provided by a Pharmacist:  -Proper dose, indication, possible ADRs   -How the medication works and benefits of taking it   -Importance of compliance   -Potential duration of therapy    Identified potential barriers to education:  None    Method(s) of Education:  Verbal Written materials provided and reviewed    An opportunity to ask questions and receive answers was provided.     Assessment of understanding the patient :  2= meets goals/outcomes    Additional Notes (if applicable): Meds to beds given to patient.    Bridget Sanchez, LashandaD

## 2024-02-28 NOTE — CONSULTS
Consults    Reason For Consult  Hypertension    History Of Present Illness  Alexis Yap is a 63 y.o. female with right hip pain presents for elective right total hip replacement.  Patient underwent preadmission testing on February 15.  Lab work was notable for hemoglobin A1c 6.9.    Patient underwent successful surgery.  No intraoperative complications were documented.  Patient underwent peripheral nerve block.  Estimated blood loss was 300 mL.  Vital signs in PACU, temperature 36, pulse 69, respiratory 14, blood pressure 1[birth year]1981, saturation 98%.  Patient was initially planned for discharge however was unable to participate in physical therapy and patient was kept overnight for observation.     Patient was seen and examined on the medical floor.  Her pain was well-controlled and she had been up to the bathroom several times.     Past Medical History  She has a past medical history of Acute sinusitis, unspecified (08/19/2013), Allergy status to unspecified drugs, medicaments and biological substances, Anxiety, Arthritis, Chronic bronchitis (CMS/[facility]), Depression, Encounter for other general examination, GERD (gastroesophageal reflux disease), Hallux rigidus, unspecified foot, Hyperlipidemia, Hypertension, Hypothyroidism, Irritable bowel syndrome, Other gastritis without bleeding (08/11/2016), Other specified anxiety disorders, Pain in left ankle and joints of left foot, Personal history of other diseases of the musculoskeletal system and connective tissue (08/19/2013), Personal history of other diseases of the musculoskeletal system and connective tissue, Personal history of other diseases of the nervous system and sense organs, Personal history of other diseases of the respiratory system (08/19/2013), Personal history of other endocrine, nutritional and metabolic disease (08/19/2013), and Personal history of other specified conditions.  Prediabetes    Surgical History  She has a past surgical history that  includes Tonsillectomy (08/19/2013); Hysterectomy (09/20/2017); Gallbladder surgery (09/20/2017); Hemorrhoid surgery (09/20/2017); Bladder surgery (09/20/2017); Bunionectomy (Right, 09/20/2017); Joint replacement (Left); and Other surgical history.     Social History  She reports that she has never smoked. She has never used smokeless tobacco. She reports current alcohol use of about 1.0 standard drink of alcohol per week. She reports that she does not use drugs.    Family History  Family History   Problem Relation Name Age of Onset    Colon cancer Mother          Allergies  Butalbital-acetaminop-caf-cod, Butalbital-acetaminophen-caff, Codeine, and Anaprox [naproxen sodium]    Review of Systems  10 point organ system reviewed, pertinent positive mentioned in HPI, others negative     Physical Exam  Gen.: Alert and oriented ×3, no acute distress  Head: Normocephalic atraumatic  Eyes:  Pupils equal reactive to light, extraocular muscle intact  Neck: No cervical lymphadenopathy thyromegaly, trachea midline   Heart: Regular rate and rhythm, no murmurs rubs or gallops  Lungs: Clear to auscultation bilaterally, no wheezes, rales, or rhonchi  Abdomen: Soft nontender positive bowel sounds, no rebound or guarding  Extremities: No peripheral edema cyanosis or clubbing  Skin: Warm and intact  Neuro: Cranial nerves II 2 through 12 grossly intact, no focal deficits  Psych: Insight and judgment intact       Last Recorded Vitals  /72 (BP Location: Right arm, Patient Position: Lying)   Pulse 84   Temp 36.3 °C (97.3 °F) (Temporal)   Resp 16   Wt 108 kg (237 lb 7 oz)   SpO2 96%     Relevant Results  No results found for this or any previous visit (from the past 96 hour(s)).      Assessment/Plan   Status post right hip replacement  Acute right hip pain  Hypertension  Prediabetes   hypothyroidism  Hyperlipidemia  GERD  Depression  DVT prophylaxis  PT, OT    PLAN  Stop surgical care per orthopedics.  Pain control has been  ordered with scheduled Toradol and Tylenol.  In addition the patient has oxycodone and Dilaudid available as needed.  Encourage incentive spirometry.  Monitor postop labs.  Continue patient's home medications, however hold hydrochlorothiazide while receiving IV fluids.  Aspirin has been ordered for DVT prophylaxis.    Tone Breen, DO

## 2024-02-28 NOTE — DISCHARGE SUMMARY
Discharge Diagnosis  Right Total Hip Arthroplasty    Services After Discharge  Home health care services to start within 48 hours. Outpatient PT to start per surgeon instructions.    Test Results Pending at Discharge  None      Hospital Course  63 y.o. female who underwent Right Total Hip Arthroplasty with Dr. Morel.  The patient was then taken to the PACU in stable condition and subsequently transferred to the floor. The patient received 24 hours of bang-operative antibiotics. DVT prophylaxis in the form of aspirin was started. Pain was appropriately controlled. Diet was advanced as tolerated. PT/OT was consulted and recommended discharge to home. Patient had an uneventful hospital course. On the day of discharge, patient was afebrile with stable vital signs. Patient will follow-up in 2 weeks with Dr. Morel for a wound check.     Physical Exam at Time of Discharge  Physical Exam  NAD  Dressing/Incision c/d/i  Firing TA/EHL/GS  SILT L4-S1   2+ DP  WWP     Drain: None    Home Medications  Scheduled medications    Current Facility-Administered Medications:     acetaminophen (Tylenol) tablet 650 mg, 650 mg, oral, q6h ELIZABETH, Bharath Johnston MD, 650 mg at 02/28/24 0608    ALPRAZolam (Xanax) tablet 0.5 mg, 0.5 mg, oral, Daily PRN, Laly Ogden PA-C    aspirin EC tablet 81 mg, 81 mg, oral, BID, Bharath Johnston MD, 81 mg at 02/28/24 0840    atorvastatin (Lipitor) tablet 20 mg, 20 mg, oral, Every other day, Laly Ogden PA-C    bisacodyl (Dulcolax) EC tablet 10 mg, 10 mg, oral, Daily PRN, Bharath Johnston MD    buPROPion XL (Wellbutrin XL) 24 hr tablet 300 mg, 300 mg, oral, q AM, Laly Ogden PA-C, 300 mg at 02/28/24 0841    HYDROmorphone (Dilaudid) injection 0.5 mg, 0.5 mg, intravenous, q4h PRN, Bharath Johnston MD    HYDROmorphone (Dilaudid) injection 0.5 mg, 0.5 mg, intravenous, q2h PRN, Bharath Johnston MD    HYDROmorphone PF (Dilaudid) injection 0.2 mg, 0.2 mg, intravenous, q4h PRN, Bharath  MD Vickie    ibuprofen tablet 600 mg, 600 mg, oral, TID, Bharath Johnston MD    lactated Ringer's infusion, 100 mL/hr, intravenous, Continuous, Laly Ogden PA-C, Last Rate: 100 mL/hr at 02/27/24 2132, 100 mL/hr at 02/27/24 2132    levothyroxine (Synthroid, Levoxyl) tablet 125 mcg, 125 mcg, oral, Daily, Laly Ogden PA-C, 125 mcg at 02/28/24 0608    magnesium oxide (Mag-Ox) tablet 400 mg, 400 mg, oral, Daily, Laly Ogden PA-C, 400 mg at 02/28/24 0841    melatonin tablet 6 mg, 6 mg, oral, Nightly PRN, Tone Breen DO, 6 mg at 02/27/24 2030    metoprolol tartrate (Lopressor) tablet 50 mg, 50 mg, oral, BID, Laly Ogden PA-C, 50 mg at 02/28/24 0840    naloxone (Narcan) injection 0.2 mg, 0.2 mg, intravenous, q5 min PRN, Bharath Johnston MD    ondansetron ODT (Zofran-ODT) disintegrating tablet 4 mg, 4 mg, oral, q8h PRN, 4 mg at 02/27/24 1507 **OR** ondansetron (Zofran) injection 4 mg, 4 mg, intravenous, q8h PRN, Bharath Johnston MD    oxyCODONE (Roxicodone) immediate release tablet 10 mg, 10 mg, oral, q4h PRN, Bharath Johnston MD    oxyCODONE (Roxicodone) immediate release tablet 2.5 mg, 2.5 mg, oral, q4h PRN, Bharath Johnston MD    oxyCODONE (Roxicodone) immediate release tablet 5 mg, 5 mg, oral, q6h PRN, Bharath Johnston MD    oxygen (O2) therapy, 2 L/min, inhalation, Continuous PRN - O2/gases, Blanca Morel MD    pantoprazole (ProtoNix) EC tablet 40 mg, 40 mg, oral, Daily before breakfast, Bharath Johnston MD, 40 mg at 02/28/24 0608    polyethylene glycol (Glycolax, Miralax) packet 17 g, 17 g, oral, Daily, Bharath Johnston MD, 17 g at 02/27/24 2025    scopolamine (Transderm-Scop) patch 1 patch, 1 patch, transdermal, Once, Armin Freed, Nichelle     PRN medications  PRN medications: ALPRAZolam, bisacodyl, HYDROmorphone, HYDROmorphone, HYDROmorphone, melatonin, naloxone, ondansetron ODT **OR** ondansetron, oxyCODONE, oxyCODONE, oxyCODONE, oxygen    Discharge medications     Your  medication list        START taking these medications        Instructions Last Dose Given Next Dose Due   aspirin 81 mg chewable tablet  Replaces: aspirin 81 mg EC tablet      Chew 1 tablet (81 mg) 2 times a day.       cefadroxil 500 mg capsule  Commonly known as: Duricef      Take 1 capsule (500 mg) by mouth 2 times a day for 7 days.       docusate sodium 100 mg capsule  Commonly known as: Colace      Take 1 capsule (100 mg) by mouth 2 times a day for 15 days.       meloxicam 15 mg tablet  Commonly known as: Mobic      Take 1 tablet (15 mg) by mouth once daily.       ondansetron 4 mg tablet  Commonly known as: Zofran      Take 1 tablet (4 mg) by mouth every 8 hours if needed for nausea or vomiting.       oxyCODONE 5 mg immediate release tablet  Commonly known as: Roxicodone      Take 1-2 tablets (5-10 mg) by mouth every 6 hours if needed for severe pain (7 - 10) for up to 7 days.       pantoprazole 40 mg EC tablet  Commonly known as: ProtoNix      Take 1 tablet (40 mg) by mouth once daily in the morning. Take before meals. Do not crush, chew, or split.       senna 8.6 mg tablet  Generic drug: sennosides      Take 1 tablet (8.6 mg) by mouth once daily for 15 days.       traMADol 50 mg tablet  Commonly known as: Ultram      Take 1-2 tablets ( mg) by mouth every 6 hours if needed for severe pain (7 - 10) for up to 7 days.              CHANGE how you take these medications        Instructions Last Dose Given Next Dose Due   acetaminophen 500 mg tablet  Commonly known as: Tylenol  What changed:   medication strength  how much to take  when to take this  reasons to take this      Take 2 tablets (1,000 mg) by mouth every 8 hours.              CONTINUE taking these medications        Instructions Last Dose Given Next Dose Due   ALPRAZolam 0.5 mg tablet  Commonly known as: Xanax      Take 1 tablet (0.5 mg) by mouth once daily as needed for anxiety.       buPROPion  mg 24 hr tablet  Commonly known as: Wellbutrin  XL      Take 1 tablet (300 mg) by mouth once daily in the morning. Do not crush, chew, or split.       cholecalciferol 125 MCG (5000 UT) capsule  Commonly known as: Vitamin D-3           hydroCHLOROthiazide 25 mg tablet  Commonly known as: HYDRODiuril      Take 1 tablet (25 mg) by mouth once daily.       levothyroxine 125 mcg tablet  Commonly known as: Synthroid, Levoxyl      Take 1 tablet (125 mcg) by mouth once daily.       magnesium oxide 400 mg tablet  Commonly known as: Mag-Ox           metoprolol tartrate 50 mg tablet  Commonly known as: Lopressor      TAKE 1 TABLET TWICE A DAY (NEED APPOINTMENT, LAST FILL)       potassium chloride CR 20 mEq ER tablet  Commonly known as: Klor-Con M20      TAKE 1 TABLET DAILY       rosuvastatin 10 mg tablet  Commonly known as: Crestor           saccharomyces boulardii 250 mg capsule  Commonly known as: Florastor           TURMERIC ORAL                  STOP taking these medications      aspirin 81 mg EC tablet  Replaced by: aspirin 81 mg chewable tablet        glucosamine-chondroitin 500-400 mg tablet        ibuprofen 800 mg tablet        naproxen 250 mg tablet  Commonly known as: Naprosyn                  Where to Get Your Medications        These medications were sent to [facility]  [id number], [place] [id number] [address]      Hours: 8 AM to 6 PM Mon-Fri, 9 AM to 1 PM Saturday Phone: [phone]   acetaminophen 500 mg tablet  aspirin 81 mg chewable tablet  cefadroxil 500 mg capsule  docusate sodium 100 mg capsule  meloxicam 15 mg tablet  ondansetron 4 mg tablet  oxyCODONE 5 mg immediate release tablet  pantoprazole 40 mg EC tablet  senna 8.6 mg tablet  traMADol 50 mg tablet            Outpatient Follow-Up  2 weeks for wound check with Dr. Morel.     Discharge Instructions  Continue Physical Therapy and Mobilize  Weightbearing as tolerated  and Posterior hip precautions  DVT Prophylaxis:  Aspirin 81 mg BID starting POD 0, continue for 6 weeks,  Sequential compression devices, and EUFEMIA hose   Ice to surgical site every 4 hours       Blanca Morel MD   Adult Reconstruction and Joint Replacement

## 2024-02-28 NOTE — NURSING NOTE
Pt was sitting in chair when I went in to give scheduled tylenol. Pt was asked was she in any pain she stated she was not having any at the moment. Let pt know that if she was to call out and we can get her some pain meds. Call light within reach.

## 2024-02-28 NOTE — CARE PLAN
TCC met with patient at the bedside during IDR to discuss care/discharge plan.  Pt is POD#1 Right Hip Replacement with Dr. Morel.  She was initially a RR, but discharge was postponed due to symptomatic hypotension and severe pain which has now resolved.  Plan is home today with [facility] PT/OT. SOC 2/29/24.   n/a

## 2024-02-28 NOTE — PROGRESS NOTES
"  Progress Note:    Alexis Yap is a 63 y.o. female on day 0 of admission presenting with Unilateral primary osteoarthritis, right hip.    Subjective   During interdisciplinary rounds patient expresses acceptable pain level.  Denies any complaints.  She actually says her back pain has improved when she slept on her side and she has no further dizziness.  I have reviewed all vitals, labs, and notes. Patient is medically acceptable for discharge.     Physical Exam  General: No acute distress, alert & oriented    Cardiac: RRR, NL S1 and S2, no murmurs, rubs or gallops    Pulmonary: Lungs clear to auscultation bilaterally, no wheezes, rhales or rhonchi    Abdomen: Soft, non-tender, non-distended    Extremities: No clubbing , cyanosis or edema. Has thick legs/ankles but no pitting edema.     Last Recorded Vitals  Blood pressure 112/52, pulse 78, temperature 36.1 °C (97 °F), temperature source Temporal, resp. rate 16, height 1.676 m (5' 6\"), weight 108 kg (237 lb 7 oz), SpO2 98 %.    Scheduled medications   Medication Dose Route Frequency    acetaminophen  650 mg oral q6h ELIZABETH    aspirin  81 mg oral BID    atorvastatin  20 mg oral Every other day    buPROPion XL  300 mg oral q AM    ibuprofen  600 mg oral TID    levothyroxine  125 mcg oral Daily    magnesium oxide  400 mg oral Daily    metoprolol tartrate  50 mg oral BID    pantoprazole  40 mg oral Daily before breakfast    polyethylene glycol  17 g oral Daily    scopolamine  1 patch transdermal Once     Relevant Results  Results from last 7 days   Lab Units 02/28/24  0352   WBC AUTO x10*3/uL 13.2*   HEMOGLOBIN g/dL 11.5*   HEMATOCRIT % 33.4*   PLATELETS AUTO x10*3/uL 245      Results from last 7 days   Lab Units 02/28/24  0352   SODIUM mmol/L 137   POTASSIUM mmol/L 3.7   CHLORIDE mmol/L 104   CO2 mmol/L 21   BUN mg/dL 17   CREATININE mg/dL 0.71   GLUCOSE mg/dL 211*   CALCIUM mg/dL 8.4*         Assessment/Plan   1) s/p R THR   Ortho management   PT " eval/treat  Incentive spirometry  BM management  Supportive Care  Antibiotic prophylaxis per ortho  2) DVT prophylaxis   Per Ortho management  81 mg ASA BID  SCDs  Ambulate as tolerated  3) hypertension   Restart metoprolol, hydrochlorothiazide, potassium supplement  4) Diabetes A1c 6.9 (2/15/24)   Follow with medical doctor  5) hypothyroidism   Continue levothyroxine  6) disposition-medically acceptable for discharge       I spent 15 minutes in the professional and overall care of this patient.      Laly Ogden PA-C

## 2024-02-28 NOTE — PROGRESS NOTES
Orthopedic Daily Progress Note    Subjective  Interval History: The patient has mild pain, The patient is awaiting PT, The patient is tolerating a diet, The patient has no nausea or vomiting, and The patient is moving feet and ankles freely. Pain well controlled on current regimen. No acute overnight events.    Objective  Vital signs in last 24 hours:  Temp:  [36 °C (96.8 °F)-36.5 °C (97.7 °F)] 36.1 °C (97 °F)  Heart Rate:  [58-84] 78  Resp:  [12-18] 16  BP: (103-139)/(49-85) 112/52  FiO2 (%):  [21 %] 21 %    Labs:  WBC   Date Value Ref Range Status   02/28/2024 13.2 (H) 4.4 - 11.3 x10*3/uL Final     WBC, Urine   Date Value Ref Range Status   02/15/2024 6-10 (A) 1-5, NONE /HPF Final     Ketones, Urine   Date Value Ref Range Status   02/15/2024 NEGATIVE NEGATIVE mg/dL Final     Clinical History   Date Value Ref Range Status   12/08/2023   Final    Encounter Diagnosis: Neoplasm of uncertain behavior of skin       [id number]  Collection Comments: Differential Diagnosis: mod atypia re-scoop  Check Margins Yes/No?:    Comments:    Dermpath Lab: Routine Histopathology (formalin-fixed tissue)  Finding Region: Left Lower Back  Specimen Objective: Well healed scar       Clonazepam   Date Value Ref Range Status   10/23/2019 <25 Cutoff <25 ng/mL Final     Bicarbonate   Date Value Ref Range Status   02/28/2024 21 21 - 32 mmol/L Final     Urea Nitrogen   Date Value Ref Range Status   02/28/2024 17 6 - 23 mg/dL Final     Creatinine   Date Value Ref Range Status   02/28/2024 0.71 0.50 - 1.05 mg/dL Final     Glucose   Date Value Ref Range Status   02/28/2024 211 (H) 74 - 99 mg/dL Final     Glucose tolerance test Fasting   Date Value Ref Range Status   08/24/2019 116 <126 mg/dL Final     Glucose tolerance test Two Hour   Date Value Ref Range Status   08/24/2019 136 <200 mg/dL Final     Glucose, Urine   Date Value Ref Range Status   02/15/2024 NEGATIVE NEGATIVE mg/dL Final     Calcium   Date Value Ref Range Status   02/28/2024 8.4  (L) 8.6 - 10.3 mg/dL Final     INR   Date Value Ref Range Status   02/15/2024 0.9 0.9 - 1.2 Final       Physical Exam  NAD  Dressing/Incision c/d/i  Firing TA/EHL/GS  SILT L4-S1   2+ DP  WWP    Drain: None    Assessment/Plan    63 y.o. female, s/p Procedure(s):  Arthroplasty Total Hip Posterior Approach (DEPUY , S-ROM AVAILABLE ON BACKUP) ** Rapid Recovery **,  POD# 1 doing well.   Continue Physical Therapy and Mobilize  Weightbearing as tolerated  and Posterior hip precautions  Follow-Up Labs  DVT Prophylaxis:  Aspirin 81 mg BID starting POD 0, continue for 6 weeks, Sequential compression devices, and EUFEMIA hose   Antibiotics: Perioperative cefazolin 2 doses (or until discharge). 7-day course of oral Cefadroxil 500 mg BID    Analgesia wean to orals. Judicious use of opioids.   Ice to surgical site every 4 hours   Advance Diet  IS, Bowel regimen  Appreciate excellent cares by Medicine.   Dispo Planning  Outpatient Follow-Up: Dr. Morel clinic at 2 weeks postoperatively     Blanca Morel MD  Adult Reconstruction and Joint Replacement

## 2024-02-28 NOTE — NURSING NOTE
1210pm Pt given written and verbal discharge instructions with verbalized understanding. Pt's iv heplock removed without difficulty, pt escorted down with all personal belongings for dc home.

## 2024-02-28 NOTE — NURSING NOTE
0745am Assumed care of pt A/Ox 4 denies any c/o pains no distress noted. Pt's right hip dressing  dry and intact, with ice pack intact. Pt in bed awake with call light in reach.

## 2024-02-28 NOTE — CARE PLAN
Problem: Balance  Goal: LTG - Patient will maintain standing and sitting balance to allow for completion of daily activities  Outcome: Progressing  Goal: STG - Maintains static standing balance without upper extremity support  Description: INTERVENTIONS:  1. Practice standing with minimal support.  2. Educate patient about maintaining total hip precautions while maintaining balance.  3. Educate patient about standing tolerance.  4. Educate patient about independence with gait, transfers, and ADL's.  5. Educate patient about use of assistive device.  6. Educate patient about self-directed care.  Outcome: Met     Problem: Mobility  Goal: LTG - Patient will ambulate community distance  Outcome: Met  Goal: LTG - Patient will navigate 4-6 steps with rails/device  Outcome: Progressing     Problem: Safety  Goal: LTG - Patient will adhere to hip precautions during ADL's and transfers  Outcome: Progressing     Problem: Compromised Skin Integrity  Goal: LTG - Patient will be free from infection  Outcome: Progressing     Problem: Pain  Goal: LTG - Patient will manage pain with the appropriate technique/Intervention  Outcome: Progressing  Goal: Goal 1  Outcome: Progressing  Note: Maintain a pain level of 4/10 or lower        Problem: Compromised Skin Integrity  Goal: LTG - Patient will be free from infection  Outcome: Progressing     Problem: Transfers  Goal: LTG - Patient will demonstrate safe transfer techniques  Outcome: Progressing

## 2024-02-28 NOTE — PROGRESS NOTES
Interdisciplinary Rounds were completed at the bedside with Patient.  Staff participating in rounds included: Clinical Nurse Orthopedic Coordinator Transitional Care Coordinator Director of Nursing/Assistant Nurse Manager Hospitalist MD/PA Physical Therapist.  Topics discussed included: Today's Plan of Care Discharge Plan and Accommodations Physical Therapy Medications/Preferred Pharmacy and the Patient was given the opportunity to ask additional questions or bring up any concerns at that time.  During the final discharge discussion and review of instructions, they will have another opportunity to review questions or concerns prior to leaving our care.  Patient was given information on who to call post-discharge should new questions or concerns arise.      The patient's plan includes:    Discharge Date/Disposition:  Home, Today with, Home Care Services  Discharge Needs: No Equipment Needs Identified  Medications/Pharmacy: [id number] service utilized for discharge prescriptions through [facility]

## 2024-02-29 ENCOUNTER — HOME CARE VISIT (OUTPATIENT)
Dept: HOME HEALTH SERVICES | Facility: HOME HEALTH | Age: 64
End: 2024-02-29
Payer: COMMERCIAL

## 2024-02-29 VITALS
SYSTOLIC BLOOD PRESSURE: 122 MMHG | RESPIRATION RATE: 18 BRPM | DIASTOLIC BLOOD PRESSURE: 76 MMHG | OXYGEN SATURATION: 97 % | HEART RATE: 79 BPM | TEMPERATURE: 98.2 F

## 2024-02-29 PROCEDURE — G0151 HHCP-SERV OF PT,EA 15 MIN: HCPCS

## 2024-02-29 PROCEDURE — 0023 HH SOC

## 2024-02-29 SDOH — HEALTH STABILITY: PHYSICAL HEALTH: EXERCISE ACTIVITY: APS, QUAD SETS/GLUTE SETS, HEEL SLIDE, SLR, SAQ, HIP ABD

## 2024-02-29 SDOH — HEALTH STABILITY: PHYSICAL HEALTH

## 2024-02-29 SDOH — HEALTH STABILITY: PHYSICAL HEALTH: EXERCISE COMMENTS: CUES AS NEEDED FOR ACCURACY AND PACING.

## 2024-02-29 SDOH — HEALTH STABILITY: PHYSICAL HEALTH: EXERCISE ACTIVITIES SETS: 1

## 2024-02-29 SDOH — HEALTH STABILITY: PHYSICAL HEALTH: PHYSICAL EXERCISE: SUPINE

## 2024-02-29 SDOH — HEALTH STABILITY: PHYSICAL HEALTH: PHYSICAL EXERCISE: 10

## 2024-02-29 ASSESSMENT — ENCOUNTER SYMPTOMS
PAIN LOCATION: RIGHT HIP
PAIN LOCATION - PAIN SEVERITY: 5/10
LIMITED RANGE OF MOTION: 1
MUSCLE WEAKNESS: 1
OCCASIONAL FEELINGS OF UNSTEADINESS: 1
PAIN LOCATION - EXACERBATING FACTORS: MOVEMENT
SUBJECTIVE PAIN PROGRESSION: GRADUALLY IMPROVING
HIGHEST PAIN SEVERITY IN PAST 24 HOURS: 5/10
LOWEST PAIN SEVERITY IN PAST 24 HOURS: 4/10
PAIN LOCATION - RELIEVING FACTORS: REST, ICE, MEDS
PAIN: 1
PERSON REPORTING PAIN: PATIENT
PAIN LOCATION - PAIN QUALITY: SORE

## 2024-02-29 ASSESSMENT — ACTIVITIES OF DAILY LIVING (ADL)
AMBULATION ASSISTANCE: ONE PERSON
OASIS_M1830: 03
AMBULATION ASSISTANCE: 1
PHYSICAL TRANSFERS ASSESSED: 1
ENTERING_EXITING_HOME: CONTACT GUARD ASSIST
CURRENT_FUNCTION: ONE PERSON

## 2024-03-04 ENCOUNTER — HOME CARE VISIT (OUTPATIENT)
Dept: HOME HEALTH SERVICES | Facility: HOME HEALTH | Age: 64
End: 2024-03-04
Payer: COMMERCIAL

## 2024-03-04 VITALS
HEART RATE: 82 BPM | TEMPERATURE: 97.2 F | DIASTOLIC BLOOD PRESSURE: 66 MMHG | RESPIRATION RATE: 18 BRPM | OXYGEN SATURATION: 98 % | SYSTOLIC BLOOD PRESSURE: 116 MMHG

## 2024-03-04 PROCEDURE — G0152 HHCP-SERV OF OT,EA 15 MIN: HCPCS

## 2024-03-04 ASSESSMENT — ENCOUNTER SYMPTOMS
ANGER WITHIN DEFINED LIMITS: 1
PAIN: 1
HIGHEST PAIN SEVERITY IN PAST 24 HOURS: 4/10
PAIN SEVERITY GOAL: 0/10
SUBJECTIVE PAIN PROGRESSION: GRADUALLY IMPROVING
AGGRESSION WITHIN DEFINED LIMITS: 1
LOWEST PAIN SEVERITY IN PAST 24 HOURS: 2/10
PERSON REPORTING PAIN: PATIENT

## 2024-03-04 ASSESSMENT — ACTIVITIES OF DAILY LIVING (ADL)
TOILETING: 1
BATHING_CURRENT_FUNCTION: INDEPENDENT
GROOMING_WITHIN_DEFINED_LIMITS: 1
BATHING ASSESSED: 1
WASHING_LB_CURRENT_FUNCTION: INDEPENDENT
TOILETING: INDEPENDENT
DRESSING_LB_CURRENT_FUNCTION: INDEPENDENT
FEEDING_WITHIN_DEFINED_LIMITS: 1

## 2024-03-04 ASSESSMENT — PAIN SCALES - PAIN ASSESSMENT IN ADVANCED DEMENTIA (PAINAD)
NEGVOCALIZATION: 0 - NONE.
BODYLANGUAGE: 0
FACIALEXPRESSION: 0 - SMILING OR INEXPRESSIVE.
FACIALEXPRESSION: 0
TOTALSCORE: 0
BREATHING: 0
BODYLANGUAGE: 0 - RELAXED.
CONSOLABILITY: 0
CONSOLABILITY: 0 - NO NEED TO CONSOLE.
NEGVOCALIZATION: 0

## 2024-03-05 ENCOUNTER — HOME CARE VISIT (OUTPATIENT)
Dept: HOME HEALTH SERVICES | Facility: HOME HEALTH | Age: 64
End: 2024-03-05
Payer: COMMERCIAL

## 2024-03-05 VITALS
OXYGEN SATURATION: 98 % | TEMPERATURE: 98.2 F | DIASTOLIC BLOOD PRESSURE: 70 MMHG | SYSTOLIC BLOOD PRESSURE: 120 MMHG | HEART RATE: 78 BPM | RESPIRATION RATE: 18 BRPM

## 2024-03-05 PROCEDURE — G0151 HHCP-SERV OF PT,EA 15 MIN: HCPCS

## 2024-03-05 SDOH — HEALTH STABILITY: PHYSICAL HEALTH: EXERCISE ACTIVITIES SETS: 1

## 2024-03-05 SDOH — HEALTH STABILITY: PHYSICAL HEALTH: PHYSICAL EXERCISE: SUPINE

## 2024-03-05 SDOH — HEALTH STABILITY: PHYSICAL HEALTH: EXERCISE ACTIVITY: APS, GLUTE/QUAD SETS, HEEL SLIDE, SLR W ACTIVE ASSIST, SAQ, HIP ABD

## 2024-03-05 SDOH — HEALTH STABILITY: PHYSICAL HEALTH

## 2024-03-05 SDOH — HEALTH STABILITY: PHYSICAL HEALTH: PHYSICAL EXERCISE: 10

## 2024-03-05 SDOH — HEALTH STABILITY: PHYSICAL HEALTH: PHYSICAL EXERCISE: SITTING

## 2024-03-05 SDOH — HEALTH STABILITY: PHYSICAL HEALTH: EXERCISE COMMENTS: CUES AS NEEDED FOR ACCURACY AND PACING.

## 2024-03-05 SDOH — HEALTH STABILITY: PHYSICAL HEALTH: EXERCISE ACTIVITY: APS, MARCHING WITHIN 90 DEGREES LIMIT, LAQ, HIP ABD/ADD

## 2024-03-05 ASSESSMENT — ENCOUNTER SYMPTOMS
PAIN LOCATION: RIGHT HIP
PERSON REPORTING PAIN: PATIENT
HIGHEST PAIN SEVERITY IN PAST 24 HOURS: 6/10
PAIN: 1
LOWEST PAIN SEVERITY IN PAST 24 HOURS: 5/10
PAIN LOCATION - PAIN QUALITY: ACHING
PAIN LOCATION - PAIN SEVERITY: 6/10
PAIN LOCATION - EXACERBATING FACTORS: MOVEMENT
SUBJECTIVE PAIN PROGRESSION: GRADUALLY IMPROVING
PAIN LOCATION - RELIEVING FACTORS: REST, ICE, MEDS

## 2024-03-07 ENCOUNTER — HOME CARE VISIT (OUTPATIENT)
Dept: HOME HEALTH SERVICES | Facility: HOME HEALTH | Age: 64
End: 2024-03-07
Payer: COMMERCIAL

## 2024-03-07 VITALS
DIASTOLIC BLOOD PRESSURE: 70 MMHG | TEMPERATURE: 97.6 F | RESPIRATION RATE: 18 BRPM | OXYGEN SATURATION: 99 % | SYSTOLIC BLOOD PRESSURE: 120 MMHG

## 2024-03-07 PROCEDURE — G0151 HHCP-SERV OF PT,EA 15 MIN: HCPCS

## 2024-03-07 SDOH — HEALTH STABILITY: PHYSICAL HEALTH: PHYSICAL EXERCISE: 10

## 2024-03-07 SDOH — HEALTH STABILITY: PHYSICAL HEALTH

## 2024-03-07 SDOH — HEALTH STABILITY: PHYSICAL HEALTH: EXERCISE ACTIVITY: APS, MARCHING, LAQ, HIP ABD/ADD

## 2024-03-07 SDOH — HEALTH STABILITY: PHYSICAL HEALTH: EXERCISE ACTIVITIES SETS: 1

## 2024-03-07 SDOH — HEALTH STABILITY: PHYSICAL HEALTH: PHYSICAL EXERCISE: STANDING

## 2024-03-07 SDOH — HEALTH STABILITY: PHYSICAL HEALTH: EXERCISE ACTIVITY: CALF RAISES, HIP FLEX/EXT/ABD, HS CURL, MINI SQUATS, MARCHING

## 2024-03-07 SDOH — HEALTH STABILITY: PHYSICAL HEALTH: PHYSICAL EXERCISE: SUPINE

## 2024-03-07 SDOH — HEALTH STABILITY: PHYSICAL HEALTH: PHYSICAL EXERCISE: SITTING

## 2024-03-07 SDOH — HEALTH STABILITY: PHYSICAL HEALTH: EXERCISE COMMENTS: VERBAL CUES FOR STANDING THER EX ACCURACY.

## 2024-03-07 SDOH — HEALTH STABILITY: PHYSICAL HEALTH: EXERCISE ACTIVITY: APS, GLUTE/QAUD SETS, HEEL SLIDE, SLR, SAQ, HIP ABD

## 2024-03-07 ASSESSMENT — ENCOUNTER SYMPTOMS
PAIN LOCATION: RIGHT HIP
PERSON REPORTING PAIN: PATIENT
SUBJECTIVE PAIN PROGRESSION: GRADUALLY IMPROVING
PAIN LOCATION - RELIEVING FACTORS: REST, ICE, MEDS
PAIN LOCATION - PAIN SEVERITY: 3/10
PAIN: 1
PAIN LOCATION - PAIN QUALITY: ACHING
HIGHEST PAIN SEVERITY IN PAST 24 HOURS: 4/10
PAIN LOCATION - EXACERBATING FACTORS: MOVEMENT
LOWEST PAIN SEVERITY IN PAST 24 HOURS: 3/10

## 2024-03-11 ENCOUNTER — OFFICE VISIT (OUTPATIENT)
Dept: ORTHOPEDIC SURGERY | Facility: CLINIC | Age: 64
End: 2024-03-11
Payer: COMMERCIAL

## 2024-03-11 DIAGNOSIS — Z96.641 S/P TOTAL RIGHT HIP ARTHROPLASTY: Primary | ICD-10-CM

## 2024-03-11 PROCEDURE — 3048F LDL-C <100 MG/DL: CPT | Performed by: STUDENT IN AN ORGANIZED HEALTH CARE EDUCATION/TRAINING PROGRAM

## 2024-03-11 PROCEDURE — 99024 POSTOP FOLLOW-UP VISIT: CPT | Performed by: STUDENT IN AN ORGANIZED HEALTH CARE EDUCATION/TRAINING PROGRAM

## 2024-03-11 PROCEDURE — 1036F TOBACCO NON-USER: CPT | Performed by: STUDENT IN AN ORGANIZED HEALTH CARE EDUCATION/TRAINING PROGRAM

## 2024-03-11 PROCEDURE — 3044F HG A1C LEVEL LT 7.0%: CPT | Performed by: STUDENT IN AN ORGANIZED HEALTH CARE EDUCATION/TRAINING PROGRAM

## 2024-03-11 ASSESSMENT — PAIN - FUNCTIONAL ASSESSMENT: PAIN_FUNCTIONAL_ASSESSMENT: NO/DENIES PAIN

## 2024-03-12 ENCOUNTER — HOME CARE VISIT (OUTPATIENT)
Dept: HOME HEALTH SERVICES | Facility: HOME HEALTH | Age: 64
End: 2024-03-12
Payer: COMMERCIAL

## 2024-03-12 VITALS
RESPIRATION RATE: 18 BRPM | HEART RATE: 87 BPM | OXYGEN SATURATION: 98 % | DIASTOLIC BLOOD PRESSURE: 82 MMHG | TEMPERATURE: 98 F | SYSTOLIC BLOOD PRESSURE: 132 MMHG

## 2024-03-12 PROCEDURE — G0151 HHCP-SERV OF PT,EA 15 MIN: HCPCS

## 2024-03-12 SDOH — HEALTH STABILITY: PHYSICAL HEALTH

## 2024-03-12 SDOH — HEALTH STABILITY: PHYSICAL HEALTH: PHYSICAL EXERCISE: SITTING

## 2024-03-12 SDOH — HEALTH STABILITY: PHYSICAL HEALTH: EXERCISE ACTIVITIES SETS: 1

## 2024-03-12 SDOH — HEALTH STABILITY: PHYSICAL HEALTH: EXERCISE COMMENTS: ALL WITHIN HIP PRECAUTION LIMITS.

## 2024-03-12 SDOH — HEALTH STABILITY: PHYSICAL HEALTH: PHYSICAL EXERCISE: SUPINE

## 2024-03-12 SDOH — HEALTH STABILITY: PHYSICAL HEALTH: EXERCISE ACTIVITY: CALF RAISES, HIP FLEX/EXT/ABD, HS CURL, MINI SQUATS, MARCHING

## 2024-03-12 SDOH — HEALTH STABILITY: PHYSICAL HEALTH: EXERCISE ACTIVITY: APS, MARCHING, LAQ, HIP ABD/ADD

## 2024-03-12 SDOH — HEALTH STABILITY: PHYSICAL HEALTH: EXERCISE ACTIVITY: APS, GLUTE/QUAD SETS, HEEL SLIDE, SLR, SAQ, HIP ABD

## 2024-03-12 SDOH — HEALTH STABILITY: PHYSICAL HEALTH: PHYSICAL EXERCISE: 15

## 2024-03-12 SDOH — HEALTH STABILITY: PHYSICAL HEALTH: PHYSICAL EXERCISE: STANDING

## 2024-03-12 SDOH — HEALTH STABILITY: PHYSICAL HEALTH: PHYSICAL EXERCISE: 10

## 2024-03-12 ASSESSMENT — ENCOUNTER SYMPTOMS
PAIN LOCATION - EXACERBATING FACTORS: MOVEMENT
PERSON REPORTING PAIN: PATIENT
PAIN LOCATION - PAIN SEVERITY: 2/10
PAIN LOCATION: RIGHT HIP
PAIN LOCATION - RELIEVING FACTORS: REST, MEDS
PAIN LOCATION - PAIN QUALITY: PINCH
PAIN: 1

## 2024-03-14 ENCOUNTER — APPOINTMENT (OUTPATIENT)
Dept: HOME HEALTH SERVICES | Facility: HOME HEALTH | Age: 64
End: 2024-03-14
Payer: COMMERCIAL

## 2024-03-19 ENCOUNTER — HOME CARE VISIT (OUTPATIENT)
Dept: HOME HEALTH SERVICES | Facility: HOME HEALTH | Age: 64
End: 2024-03-19
Payer: COMMERCIAL

## 2024-03-19 VITALS — OXYGEN SATURATION: 98 % | HEART RATE: 76 BPM | RESPIRATION RATE: 18 BRPM | TEMPERATURE: 97.1 F

## 2024-03-19 PROCEDURE — G0151 HHCP-SERV OF PT,EA 15 MIN: HCPCS

## 2024-03-19 SDOH — HEALTH STABILITY: PHYSICAL HEALTH

## 2024-03-19 SDOH — HEALTH STABILITY: PHYSICAL HEALTH: PHYSICAL EXERCISE: SITTING

## 2024-03-19 SDOH — HEALTH STABILITY: PHYSICAL HEALTH: EXERCISE ACTIVITIES SETS: 1

## 2024-03-19 SDOH — HEALTH STABILITY: PHYSICAL HEALTH: EXERCISE ACTIVITY: APS, GLUTE/QUAD SETS, HEEL SLIDE, SLR, SAQ, HIP ABD

## 2024-03-19 SDOH — HEALTH STABILITY: PHYSICAL HEALTH: PHYSICAL EXERCISE: STANDING

## 2024-03-19 SDOH — HEALTH STABILITY: PHYSICAL HEALTH: PHYSICAL EXERCISE: SUPINE

## 2024-03-19 SDOH — HEALTH STABILITY: PHYSICAL HEALTH: EXERCISE ACTIVITY: STEP UPS RLE

## 2024-03-19 SDOH — HEALTH STABILITY: PHYSICAL HEALTH: PHYSICAL EXERCISE: 15

## 2024-03-19 SDOH — HEALTH STABILITY: PHYSICAL HEALTH: EXERCISE ACTIVITY: APS, MARCHING, LAQ, HIP ABD/ADD

## 2024-03-19 SDOH — HEALTH STABILITY: PHYSICAL HEALTH: PHYSICAL EXERCISE: 3

## 2024-03-19 SDOH — HEALTH STABILITY: PHYSICAL HEALTH: EXERCISE ACTIVITY: CALF RAISES, HIP FLEX/EXT/ABD, HS CURL, MARCHING, MINI SQUATS

## 2024-03-19 ASSESSMENT — ENCOUNTER SYMPTOMS
PAIN LOCATION - PAIN QUALITY: ACHY
HIGHEST PAIN SEVERITY IN PAST 24 HOURS: 2/10
PAIN: 1
PAIN LOCATION: RIGHT HIP
PAIN LOCATION - RELIEVING FACTORS: REST, ICE, MEDS
PAIN LOCATION - EXACERBATING FACTORS: MOVEMENT
PERSON REPORTING PAIN: PATIENT
SUBJECTIVE PAIN PROGRESSION: GRADUALLY IMPROVING
LOWEST PAIN SEVERITY IN PAST 24 HOURS: 1/10
PAIN LOCATION - PAIN FREQUENCY: INFREQUENT
PAIN LOCATION - PAIN SEVERITY: 1/10

## 2024-03-19 NOTE — PROGRESS NOTES
Blanca Morel MD   Adult Reconstruction and Joint Replacement Surgery  Phone: [phone]     Fax: [phone]     HIP REPLACEMENT POSTOPERATIVE VISIT      Name: Alexis Yap  : 1960  Date of Visit: 24    Procedure: right posterior total hip replacement  Date of Surgery: 24   Diagnosis: right hip arthritis      Chief Complaint: Right hip replacement surgery follow-up    History of Present Illness:    The patient is now 7 weeks 6 days status post right posterior total hip replacement surgery.    The patient has mild or occasional pain.    Currently taking ibuprofen for pain.     The patient has low stiffness.     The patient has slight limp.    Patient is walking with nothing for assistive device.    The patient goes up and down stairs normally.    Patient can walk 2-3 blocks.    The patient puts shoes and socks on with ease.     The patient is doing home physical therapy. She did not do outpatient PT afterward per her preference but would like to start now.     The patient does have trochanteric pain.    No fevers or drainage from the incision.    There are no concerns.    Physical Exam:    The patient is well appearing, alert and oriented to person, place and time.    The incision is well healed.  There is no sign of wound complication.    There is no tenderness over the greater trochanter.    Range of motion is: full extension to 100 degrees of flexion.    The hip internally rotates to 15 degrees and externally rotates to 30 degrees.    Abduction is 30 degrees and adduction is 15 degrees.    There is no instability of the joint.    Homans sign is negative.    Neurologic, and vascular examinations are normal.    PROMs  None     Imaging:    X-rays were personally reviewed today and show implants in good position with no evidence of complication.    Impression and Plan:    63 y.o. female 7 weeks 6 days from right posterior total hip replacement.    The patient is doing well following  total hip replacement surgery.  Antibiotic prophylaxis prior to dental procedures were reviewed.  Hip precautions were reviewed.  Long-term failure mechanisms were reviewed.  The patient was asked to contact the office sooner if there are any concerns. New physical therapy prescription was given and exercises reviewed with the patient in the office.  The patient previously did not do any outpatient physical therapy but would like to now for attention to improving range of motion about her hip joint.  She was also provided with a prescription for ibuprofen by request.  Their questions were answered.     RTC: 3 months     X-rays at next visit: Postop ULYSSES series     _____________________  Blanca Morel MD  [facility]

## 2024-03-21 ENCOUNTER — HOME CARE VISIT (OUTPATIENT)
Dept: HOME HEALTH SERVICES | Facility: HOME HEALTH | Age: 64
End: 2024-03-21
Payer: COMMERCIAL

## 2024-03-21 PROCEDURE — G0151 HHCP-SERV OF PT,EA 15 MIN: HCPCS

## 2024-03-21 ASSESSMENT — ACTIVITIES OF DAILY LIVING (ADL)
HOME_HEALTH_OASIS: 00
OASIS_M1830: 00
AMBULATION ASSISTANCE ON FLAT SURFACES: 1
AMBULATION_DISTANCE/DURATION_TOLERATED: 150+ FEET

## 2024-03-21 ASSESSMENT — ENCOUNTER SYMPTOMS
PAIN LOCATION - PAIN SEVERITY: 1/10
LOWEST PAIN SEVERITY IN PAST 24 HOURS: 0/10
PERSON REPORTING PAIN: PATIENT
HIGHEST PAIN SEVERITY IN PAST 24 HOURS: 1/10
PAIN LOCATION - RELIEVING FACTORS: REST
SUBJECTIVE PAIN PROGRESSION: RAPIDLY IMPROVING
PAIN: 1
PAIN LOCATION: RIGHT HIP
PAIN LOCATION - PAIN QUALITY: ACHY
PAIN LOCATION - EXACERBATING FACTORS: MOVEMENT

## 2024-04-12 ENCOUNTER — APPOINTMENT (OUTPATIENT)
Dept: DERMATOLOGY | Facility: CLINIC | Age: 64
End: 2024-04-12
Payer: COMMERCIAL

## 2024-04-22 ENCOUNTER — OFFICE VISIT (OUTPATIENT)
Dept: ORTHOPEDIC SURGERY | Facility: HOSPITAL | Age: 64
End: 2024-04-22
Payer: COMMERCIAL

## 2024-04-22 ENCOUNTER — HOSPITAL ENCOUNTER (OUTPATIENT)
Dept: RADIOLOGY | Facility: HOSPITAL | Age: 64
Discharge: HOME | End: 2024-04-22
Payer: COMMERCIAL

## 2024-04-22 DIAGNOSIS — M16.11 UNILATERAL PRIMARY OSTEOARTHRITIS, RIGHT HIP: ICD-10-CM

## 2024-04-22 DIAGNOSIS — Z96.641 S/P TOTAL RIGHT HIP ARTHROPLASTY: Primary | ICD-10-CM

## 2024-04-22 PROCEDURE — 73502 X-RAY EXAM HIP UNI 2-3 VIEWS: CPT | Mod: RT

## 2024-04-22 PROCEDURE — 99024 POSTOP FOLLOW-UP VISIT: CPT | Performed by: STUDENT IN AN ORGANIZED HEALTH CARE EDUCATION/TRAINING PROGRAM

## 2024-04-22 PROCEDURE — 73501 X-RAY EXAM HIP UNI 1 VIEW: CPT | Mod: RIGHT SIDE | Performed by: RADIOLOGY

## 2024-04-22 PROCEDURE — 3044F HG A1C LEVEL LT 7.0%: CPT | Performed by: STUDENT IN AN ORGANIZED HEALTH CARE EDUCATION/TRAINING PROGRAM

## 2024-04-22 PROCEDURE — 3048F LDL-C <100 MG/DL: CPT | Performed by: STUDENT IN AN ORGANIZED HEALTH CARE EDUCATION/TRAINING PROGRAM

## 2024-04-22 ASSESSMENT — PAIN - FUNCTIONAL ASSESSMENT: PAIN_FUNCTIONAL_ASSESSMENT: 0-10

## 2024-04-22 ASSESSMENT — PAIN SCALES - GENERAL: PAINLEVEL_OUTOF10: 3

## 2024-04-22 ASSESSMENT — PAIN DESCRIPTION - DESCRIPTORS: DESCRIPTORS: DISCOMFORT;SORE

## 2024-04-23 RX ORDER — IBUPROFEN 800 MG/1
800 TABLET ORAL EVERY 8 HOURS PRN
Qty: 30 TABLET | Refills: 0 | Status: SHIPPED | OUTPATIENT
Start: 2024-04-23

## 2024-04-24 NOTE — PROGRESS NOTES
Physical Therapy  Physical Therapy Orthopedic Evaluation    Patient Name: Alexis Yap  MRN: [id number]  Today's Date: 4/25/2024  Time Calculation  Start Time: 1130  Stop Time: 1217  Time Calculation (min): 47 min  PT Evaluation Time Entry  PT Evaluation (Low) Time Entry: 27  PT Therapeutic Procedures Time Entry  Manual Therapy Time Entry: 5  Therapeutic Exercise Time Entry: 14       Insurance:  Number of Treatments Authorized: 1/40          Current Problem  1. Pain of right hip  Follow Up In Physical Therapy      2. S/P total right hip arthroplasty  Referral to Physical Therapy          General:  General  Reason for Referral: R hip pain/stiffness s/p ULYSSES on 2/27/24 with posterior approach  Referred By: Maria Teresa  Past Medical History Relevant to Rehab: L ULYSSES - august 2020; hx of knee pain, R foot bunyon removal; LBP; recent R shoulder pain following ULYSSES surgery    Precautions:   Precautions  ONEALADI Fall Risk Score (The score of 4 or more indicates an increased risk of falling): 0  Precautions Comment: Posterior ULYSSES precautions - no fall risk    Medical History Form: Reviewed (scanned into chart)    Subjective:   Subjective   Chief Complaint: Patient reports R hip painful for years. Hx of L ULYSSES August 2020.   2/27 R posterior hip replacement. Had to stay one night in the hospital d/t not feeling well. Walked with FWW 3 weeks then cane for ~3 weeks. Then without device. Difficulty now with going up/down stairs - has split level. Difficulty with putting on shoes/socks. Supposed to observe precautions for 3 months. Pt reports she still feels sore and burning at side of hip. Difficulty with sleeping - slept on back for 2 months on couch. Slept in bed for the past 5 days and it is getting better.   B knee pain - gets gel shots with [facility] - due soon.   Aggravating symptoms: stairs, walking for a while   Relieving symptoms: ice, ibuprofen/aleve, heat     Pain:  Pain Assessment: 0-10  Pain Score: 0 - No pain (4/10  pain at worst with activity)  Pain Location: Hip  Pain Orientation: Right    Relevant Information (PMH & Previous Tests/Imaging): x-ray good healing    Prior Level of Function (PLOF)  Patient previously independent with all ADLs  Exercise/Physical Activity: wants to get back to gym and walking  Work/School: retired    Patients Living Environment: Reviewed and no concern    Primary Language: English    Patient's Goal(s) for Therapy: No pain and return to normal walking    Personal factors/comorbidities affecting outcomes: none    Red Flags: Do you have any of the following? No  Fever/chills, unexplained weight changes, dizziness/fainting, unexplained change in bowel or bladder functions, unexplained malaise or muscle weakness, night pain/sweats, numbness or tingling    Objective:  Pt presents with antalgic gait with reduced R LE stance time     Tender surrounding hip - scar showing good healing - long posterior scar *MD had to cut more and fit socket differently d/t bone density*   - firm tissue surrounding scar at lateral hip - educated on massaging    AROM Hip (degrees)     Flexion (R,L) 60 standing (90 H/L), 110     Extension (R,L) 10, 10      IR (R,L) neutral, 35     ER (R,L) 30, 40      Abduction (R,L) 30, 45    Hip MMT    R Hip flex NT     ER NT     IR NT     Abd 4/5     Add 4+/5    L Hip flex 5/5     ER 4+/5     IR 4+/5     Abd 4+/5     Add 5/5    R SLS 2 sec  L SLS 4 sec     Outcome Measures:  Other Measures  Lower Extremity Funtional Score (LEFS): 33/80     EDUCATION: Home exercise program, plan of care, activity modifications, pain management, and injury pathology  Outpatient Education  Individual(s) Educated: Patient  Education Provided: Home Exercise Program, Anatomy, POC  Patient/Caregiver Demonstrated Understanding: yes  Plan of Care Discussed and Agreed Upon: yes  Patient Response to Education: Patient/Caregiver Verbalized Understanding of Information  Education Comment: Access Code: [email or web address]  URL:  [email or web address]  Date: 04/25/2024  Prepared by: Ligia Goel    Exercises  - Supine Bridge  - 2 x daily - 7 x weekly - 2 sets - 10 reps  - Supine March  - 2 x daily - 7 x weekly - 2 sets - 10 reps  - Side Stepping with Counter Support  - 2 x daily - 7 x weekly - 2 sets - 20 reps  - Mini Squat with Counter Support  - 2 x daily - 7 x weekly - 2 sets - 10 reps  - Standing Marching  - 1-2 x daily - 7 x weekly - 2 sets - 20 reps  - Standing Hip Abduction  - 1-2 x daily - 7 x weekly - 2 sets - 10 reps  - Standing Hip Extension  - 1-2 x daily - 7 x weekly - 2 sets - 10 reps    Treatment Performed:  Therapeutic Exercise  Therapeutic Exercise Performed: Yes  Therapeutic Exercise Activity 1: R,L hip flexion march standing x 10 reps each  Therapeutic Exercise Activity 2: R,L hip abduction x 10 reps eahc  Therapeutic Exercise Activity 3: R,L hip extension x 10 reps each  Therapeutic Exercise Activity 4: R/L side stepping without UE support x 1 min  Therapeutic Exercise Activity 5: Squats x 1 min  Therapeutic Exercise Activity 6: Glute set + bridge x 10 reps  Therapeutic Exercise Activity 7: H/L R hip flexor march x 10 reps  Therapeutic Exercise Activity 8: *Precaution review and how to  something from ground    Manual Therapy  Manual Therapy Performed: Yes  Manual Therapy Activity 1: R hip flexor/adductor/quad release and scar mobs    Assessment: Patient is 63 year old who presents to physical therapy with signs and symptoms consistent with R hip pain and stiffness s/p R ULYSSES on 2/27/24. Patient has slightly antalgic gait, decreased ROM, increased mm tone surrounding hip and reduced strength limiting functional mobility and ADLs. Pt would benefit from skilled physical therapy in order to address the stated deficits and return to daily tasks with reduced pain and improved function.    Low complexity    Plan:  Treatment/Interventions: Cryotherapy, Education/ Instruction, Electrical stimulation,  Gait training, Hot pack, Manual therapy, Neuromuscular re-education, Self care/ home management, Taping techniques, Therapeutic activities, Therapeutic exercises  PT Plan: Skilled PT  PT Frequency: 2 times per week  Duration: 8-10 weeks, reducing frequency as goals are met  Onset Date: 02/27/24  Number of Treatments Authorized: 1/40  Rehab Potential: Good  Plan of Care Agreement: Patient      Goals: Set and discussed today  Active       R ULYSSES       STG/LTG       Start:  04/25/24    Expected End:  07/04/24       STG  1) Patient will improve LEFS score by 9 points in order to perform functional activities at home and in the community in 4 weeks.  2) Patient will be able to complete ADLs with pain less than 3/10 in hip in 4 weeks.  3) Pt will improve hip AROM flexion to 90 degrees to be able to complete ADLs with less difficulty in 4 weeks.   4) Patient will be independent with HEP to allow for continued improvement in daily tasks at home and in the community in 3 visits.   LTG  1) Patient will have >/=4+/5 strength in lateral hip musculature to aid in stability with ambulation on varied surfaces in community in 8 weeks.  2) Patient will be able to perform proper squatting technique in order to prevent increased pain with daily tasks in 8 weeks.  3) Patient will be able to perform >10 seconds of SLS on even ground in order to allow for safe ambulation and reduced fall risk within the community in 8 weeks.   4) Patient will improve LEFS score by to >/=55/80 in order to perform functional activities at home and in the community by discharge.  5) Pt will return to gym or walking regimen for wellness per goal by discharge.              Plan of care was developed with input and agreement by the patient      Ligia Goel, PT

## 2024-04-25 ENCOUNTER — EVALUATION (OUTPATIENT)
Dept: PHYSICAL THERAPY | Facility: CLINIC | Age: 64
End: 2024-04-25
Payer: COMMERCIAL

## 2024-04-25 DIAGNOSIS — M25.551 PAIN OF RIGHT HIP: Primary | ICD-10-CM

## 2024-04-25 DIAGNOSIS — Z96.641 S/P TOTAL RIGHT HIP ARTHROPLASTY: ICD-10-CM

## 2024-04-25 PROCEDURE — 97110 THERAPEUTIC EXERCISES: CPT | Mod: GP | Performed by: PHYSICAL THERAPIST

## 2024-04-25 PROCEDURE — 97161 PT EVAL LOW COMPLEX 20 MIN: CPT | Mod: GP | Performed by: PHYSICAL THERAPIST

## 2024-04-25 ASSESSMENT — ENCOUNTER SYMPTOMS
DEPRESSION: 0
LOSS OF SENSATION IN FEET: 0
OCCASIONAL FEELINGS OF UNSTEADINESS: 0

## 2024-04-25 ASSESSMENT — PAIN SCALES - GENERAL: PAINLEVEL_OUTOF10: 0 - NO PAIN

## 2024-04-25 ASSESSMENT — PAIN - FUNCTIONAL ASSESSMENT: PAIN_FUNCTIONAL_ASSESSMENT: 0-10

## 2024-04-25 ASSESSMENT — PATIENT HEALTH QUESTIONNAIRE - PHQ9
SUM OF ALL RESPONSES TO PHQ9 QUESTIONS 1 AND 2: 0
2. FEELING DOWN, DEPRESSED OR HOPELESS: NOT AT ALL
1. LITTLE INTEREST OR PLEASURE IN DOING THINGS: NOT AT ALL

## 2024-04-29 ENCOUNTER — TREATMENT (OUTPATIENT)
Dept: PHYSICAL THERAPY | Facility: CLINIC | Age: 64
End: 2024-04-29
Payer: COMMERCIAL

## 2024-04-29 DIAGNOSIS — Z96.641 S/P TOTAL RIGHT HIP ARTHROPLASTY: ICD-10-CM

## 2024-04-29 DIAGNOSIS — M25.551 PAIN OF RIGHT HIP: Primary | ICD-10-CM

## 2024-04-29 PROCEDURE — 97110 THERAPEUTIC EXERCISES: CPT | Mod: GP,CQ

## 2024-04-29 PROCEDURE — 97140 MANUAL THERAPY 1/> REGIONS: CPT | Mod: GP,CQ

## 2024-04-29 ASSESSMENT — PAIN SCALES - GENERAL: PAINLEVEL_OUTOF10: 3

## 2024-04-29 ASSESSMENT — PAIN - FUNCTIONAL ASSESSMENT: PAIN_FUNCTIONAL_ASSESSMENT: 0-10

## 2024-04-29 NOTE — PROGRESS NOTES
"  Physical Therapy Treatment    Patient Name: Alexis Yap  MRN: [id number]  Today's Date: 4/29/2024  Time Calculation  Start Time: 1515  Stop Time: 1600  Time Calculation (min): 45 min   ,      Current Problem  1. Pain of right hip  Follow Up In Physical Therapy      2. S/P total right hip arthroplasty            Insurance:  Number of Treatments Authorized: 2/40            Subjective   General  Reason for Referral: R hip pain/stiffness s/p ULYSSES on 2/27/24 with posterior approach  Referred By: Maria Teresa  Past Medical History Relevant to Rehab: L ULYSSES - august 2020; hx of knee pain, R foot bunyon removal; LBP; recent R shoulder pain following ULYSSES surgery (reviewed medical health history form - [facility])  General Comment: Patient notes she had some increase in her pain sx about 6 WPO. She had more extensive work done with R ULYSSES vs. L.  Most concerned with walking mechanics.    Performing HEP?: Yes    Precautions  Precautions  STEADI Fall Risk Score (The score of 4 or more indicates an increased risk of falling): 0  Precautions Comment: Posterior ULYSSES precautions - no fall risk  Pain  Pain Assessment: 0-10  Pain Score: 3  Pain Location: Hip  Pain Orientation: Right    Objective       Treatments:    Therapeutic Exercise  Therapeutic Exercise Activity 1: SciFit seat 10 x 4 min  Therapeutic Exercise Activity 2: incline g/s stretch x 1 min  Therapeutic Exercise Activity 3: standing at // bars - alt R/L groin stretch 10\" x 5 ea  Therapeutic Exercise Activity 4: standing R HF stretch 3 x 20\"  Therapeutic Exercise Activity 5: alt foot taps on 8\" step x 1 min  Therapeutic Exercise Activity 6: HL R/L iso clam 2 x 10 ea with RTB  Therapeutic Exercise Activity 7: PROM R hip with precautions         Manual Therapy  Manual Therapy Activity 1: R hip flexor/adductor/quad release and scar mobs  Manual Therapy Activity 2: gentle leg pull/ inf joint distraction    Therapeutic Activity  Therapeutic Activity 1: R lat 4\" step ups x " "10  Therapeutic Activity 2: attempted FWD step ups - \"catching pain\" - stopped  Therapeutic Activity 3: sit to stand from elevated plinth x 10                OP EDUCATION:       Assessment:  PT Assessment  Assessment Comment: Patient experienced a deep post hip catching with step up activities.  Glut strengthening challenging due to weakness and pain.  Some relief with manual activities.    Plan:  OP PT Plan  Treatment/Interventions: Cryotherapy, Education/ Instruction, Electrical stimulation, Gait training, Hot pack, Manual therapy, Neuromuscular re-education, Self care/ home management, Taping techniques, Therapeutic activities, Therapeutic exercises  PT Plan: Skilled PT  PT Frequency: 2 times per week  Duration: 8-10 weeks, reducing frequency as goals are met  Onset Date: 02/27/24  Number of Treatments Authorized: 2/40  Rehab Potential: Good  Plan of Care Agreement: Patient    Goals:  Active       R ULYSSES       STG/LTG       Start:  04/25/24    Expected End:  07/04/24       STG  1) Patient will improve LEFS score by 9 points in order to perform functional activities at home and in the community in 4 weeks.  2) Patient will be able to complete ADLs with pain less than 3/10 in hip in 4 weeks.  3) Pt will improve hip AROM flexion to 90 degrees to be able to complete ADLs with less difficulty in 4 weeks.   4) Patient will be independent with HEP to allow for continued improvement in daily tasks at home and in the community in 3 visits.   LTG  1) Patient will have >/=4+/5 strength in lateral hip musculature to aid in stability with ambulation on varied surfaces in community in 8 weeks.  2) Patient will be able to perform proper squatting technique in order to prevent increased pain with daily tasks in 8 weeks.  3) Patient will be able to perform >10 seconds of SLS on even ground in order to allow for safe ambulation and reduced fall risk within the community in 8 weeks.   4) Patient will improve LEFS score by to " >/=55/80 in order to perform functional activities at home and in the community by discharge.  5) Pt will return to gym or walking regimen for wellness per goal by discharge.               Rosalie Roca, PTA

## 2024-04-30 ENCOUNTER — HOSPITAL ENCOUNTER (OUTPATIENT)
Dept: RADIOLOGY | Facility: CLINIC | Age: 64
Discharge: HOME | End: 2024-04-30
Payer: COMMERCIAL

## 2024-04-30 ENCOUNTER — OFFICE VISIT (OUTPATIENT)
Dept: ORTHOPEDIC SURGERY | Facility: CLINIC | Age: 64
End: 2024-04-30
Payer: COMMERCIAL

## 2024-04-30 DIAGNOSIS — M25.511 ACUTE PAIN OF RIGHT SHOULDER: ICD-10-CM

## 2024-04-30 DIAGNOSIS — M75.81 TENDINITIS OF RIGHT ROTATOR CUFF: Primary | ICD-10-CM

## 2024-04-30 PROCEDURE — 20610 DRAIN/INJ JOINT/BURSA W/O US: CPT | Performed by: STUDENT IN AN ORGANIZED HEALTH CARE EDUCATION/TRAINING PROGRAM

## 2024-04-30 PROCEDURE — 99214 OFFICE O/P EST MOD 30 MIN: CPT | Performed by: STUDENT IN AN ORGANIZED HEALTH CARE EDUCATION/TRAINING PROGRAM

## 2024-04-30 PROCEDURE — 3048F LDL-C <100 MG/DL: CPT | Performed by: STUDENT IN AN ORGANIZED HEALTH CARE EDUCATION/TRAINING PROGRAM

## 2024-04-30 PROCEDURE — 2500000005 HC RX 250 GENERAL PHARMACY W/O HCPCS: Performed by: STUDENT IN AN ORGANIZED HEALTH CARE EDUCATION/TRAINING PROGRAM

## 2024-04-30 PROCEDURE — 2500000004 HC RX 250 GENERAL PHARMACY W/ HCPCS (ALT 636 FOR OP/ED): Performed by: STUDENT IN AN ORGANIZED HEALTH CARE EDUCATION/TRAINING PROGRAM

## 2024-04-30 PROCEDURE — 73030 X-RAY EXAM OF SHOULDER: CPT | Mod: RT

## 2024-04-30 PROCEDURE — 3044F HG A1C LEVEL LT 7.0%: CPT | Performed by: STUDENT IN AN ORGANIZED HEALTH CARE EDUCATION/TRAINING PROGRAM

## 2024-04-30 PROCEDURE — 73030 X-RAY EXAM OF SHOULDER: CPT | Mod: RIGHT SIDE | Performed by: RADIOLOGY

## 2024-04-30 RX ORDER — LIDOCAINE HYDROCHLORIDE 10 MG/ML
4 INJECTION INFILTRATION; PERINEURAL
Status: COMPLETED | OUTPATIENT
Start: 2024-04-30 | End: 2024-04-30

## 2024-04-30 RX ORDER — TRIAMCINOLONE ACETONIDE 40 MG/ML
40 INJECTION, SUSPENSION INTRA-ARTICULAR; INTRAMUSCULAR
Status: COMPLETED | OUTPATIENT
Start: 2024-04-30 | End: 2024-04-30

## 2024-04-30 RX ADMIN — TRIAMCINOLONE ACETONIDE 40 MG: 40 INJECTION, SUSPENSION INTRA-ARTICULAR; INTRAMUSCULAR at 16:30

## 2024-04-30 RX ADMIN — LIDOCAINE HYDROCHLORIDE 4 ML: 10 INJECTION, SOLUTION INFILTRATION; PERINEURAL at 16:30

## 2024-04-30 NOTE — PROGRESS NOTES
Alexis Yap is a 63 y.o. year-old  female  she is a new patient to our office and presents with a chief complaint of Right shoulder pain.  She reports she recently had her hip replaced 2 months ago and since then she has had considerable pain in her shoulder.  No injury.  She notes she has had injections in the shoulder years ago but has been fine up until recently has difficulty raising her arm overhead.      Past Medical, Family, and Social History reviewed     Review of Systems  A complete review of systems was conducted, pertinent only to the HPI noted above.    Physical Exam  GEN: Alert and Oriented x 3  Constitutional: Well appearing, in no apparent distress.  Eyes: sclera anicteric  ENT: hearing appropriate for normal conversation, neck appears symmetric with no gross thyromegaly  Pulm: No labored breathing, no wheezing  CVS: Regular rate and rhythm  PSY: normal mood and affect  Skin: No rashes, erythema, or induration around shoulder     Focused Musculoskeletal Exam:     Side: Right shoulder:  PROM:   FE (170)   ER 60 ABER/ABIR: 90/90  AROM:   FE (170)   ER 45 IR T8  Strength:  Supra [5/5] Infra [5/5] Subscap [5/5]  Abd [5/5]    Special Tests  Shoulder  Positive Kelli and Jason      ACJ:  AC TTP: [neg]  Cross Arm [neg]  AC prominence [no]      [Sensation intact Ax/median/ulnar/radial distributions  Motor intact Ax/median/radial/ulnar/AIN/PIN    X-rays of the shoulder independently viewed and interpreted: No significant degenerative findings    L Inj/Asp: R subacromial bursa on 4/30/2024 4:30 PM  Indications: pain  Details: 21 G needle, posterior approach  Medications: 40 mg triamcinolone acetonide 40 mg/mL; 4 mL lidocaine 10 mg/mL (1 %)  Outcome: tolerated well, no immediate complications  Procedure, treatment alternatives, risks and benefits explained, specific risks discussed. Consent was given by the patient. Immediately prior to procedure a time out was called to verify the correct patient,  procedure, equipment, support staff and site/side marked as required. Patient was prepped and draped in the usual sterile fashion.             The patient history, physical examination and imaging studies are consistent with the diagnosis above.    After a thorough discussion with the patient including expectations, I would recommend we continue a conservative program for now.  We discussed home/formal PT (deltoid isometrics, RTC strengthening, scapular stabilizers, stretching) and activity modification including avoidance of the positions and activities that provoke symptoms, including athletics.  We also discussed the ice and NSAIDS/tylenol. I provided a steroid injection today at his request. If they do not improve we'll get an MRI.    We will see them  back in 4-6 weeks for further follow up.

## 2024-05-01 ENCOUNTER — TREATMENT (OUTPATIENT)
Dept: PHYSICAL THERAPY | Facility: CLINIC | Age: 64
End: 2024-05-01
Payer: COMMERCIAL

## 2024-05-01 DIAGNOSIS — Z96.641 S/P TOTAL RIGHT HIP ARTHROPLASTY: ICD-10-CM

## 2024-05-01 DIAGNOSIS — M25.551 PAIN OF RIGHT HIP: Primary | ICD-10-CM

## 2024-05-01 PROCEDURE — 97140 MANUAL THERAPY 1/> REGIONS: CPT | Mod: GP,CQ

## 2024-05-01 PROCEDURE — 97530 THERAPEUTIC ACTIVITIES: CPT | Mod: GP,CQ

## 2024-05-01 PROCEDURE — 97112 NEUROMUSCULAR REEDUCATION: CPT | Mod: CQ,GP

## 2024-05-01 PROCEDURE — 97110 THERAPEUTIC EXERCISES: CPT | Mod: GP,CQ

## 2024-05-01 ASSESSMENT — PAIN SCALES - GENERAL: PAINLEVEL_OUTOF10: 3

## 2024-05-01 ASSESSMENT — PAIN - FUNCTIONAL ASSESSMENT: PAIN_FUNCTIONAL_ASSESSMENT: 0-10

## 2024-05-01 NOTE — PROGRESS NOTES
"  Physical Therapy Treatment    Patient Name: Alexis Yap  MRN: [id number]  Today's Date: 5/1/2024  Time Calculation  Start Time: 1313  Stop Time: 1410  Time Calculation (min): 57 min   ,      Current Problem  1. Pain of right hip  Follow Up In Physical Therapy      2. S/P total right hip arthroplasty            Insurance:  Number of Treatments Authorized: 3/40            Subjective   General  Reason for Referral: R hip pain/stiffness s/p ULYSSES on 2/27/24 with posterior approach  Referred By: Maria Teresa  Past Medical History Relevant to Rehab: L ULYSSES - august 2020; hx of knee pain, R foot bunyon removal; LBP; recent R shoulder pain following ULYSSES surgery (reviewed medical health history form - [facility])  General Comment: R groin/ADD pain has lessened.  Deep glut pain persistes.  Increased ease getting in/out of the car.    Performing HEP?: Yes    Precautions  Precautions  STEADI Fall Risk Score (The score of 4 or more indicates an increased risk of falling): 0  Precautions Comment: Posterior ULYSSES precautions - no fall risk  Pain  Pain Assessment: 0-10  Pain Score: 3  Pain Location: Hip  Pain Orientation: Right    Objective       Treatments:    Therapeutic Exercise  Therapeutic Exercise Activity 1: SciFit Man L1 x 5 min  Therapeutic Exercise Activity 2: incline g/s stretch x 1 min  Therapeutic Exercise Activity 3: instruct and perform R/L gastroc stretch off step for home x 30\" ea  Therapeutic Exercise Activity 4: bridge with ADD ball squeeze with pause 2 x 10  Therapeutic Exercise Activity 5: RTB iso clams R/L x 10 ea  Therapeutic Exercise Activity 6: PROM R hip with precautions    Balance/Neuromuscular Re-Education  Balance/Neuromuscular Re-Education Activity 1: slow weight sfift on airex pad x 1 min  Balance/Neuromuscular Re-Education Activity 2: NBOS on airex with arms crosses x 1 min  Balance/Neuromuscular Re-Education Activity 3: NBOS on airex, arms crossed with cervical rotation R/L x 1 min  Balance/Neuromuscular " "Re-Education Activity 4: DLB on airex pad with alt foot tap on 8\" step 2-1 UE asist x 1 min  Balance/Neuromuscular Re-Education Activity 5: lateral stepping along airex pad x 1 min    Manual Therapy  Manual Therapy Activity 1: R hip flexor/adductor/quad release and scar mobs  Manual Therapy Activity 2: gentle leg pull/ inf joint distraction  Manual Therapy Activity 3: stretch R ADD, HS    Therapeutic Activity  Therapeutic Activity 1: sit to stand from chair with 1 airex pad x 10  Therapeutic Activity 2: lateral stepping over 2 - 6\" hurdles x 1 min                OP EDUCATION:  Outpatient Education  Education Comment: Access Code: [email or web address]  URL: [email or web address]  Date: 05/01/2024  Prepared by: Jennifer Roca    Exercises  - Standing Gastroc Stretch on Step  - 1 x daily - 7 x weekly - 3 sets - 10 reps  - Supine Bridge with Mini Swiss Ball Between Knees  - 1 x daily - 7 x weekly - 3 sets - 10 reps  - Hooklying Isometric Clamshell  - 1 x daily - 7 x weekly - 3 sets - 10 reps    Assessment:  PT Assessment  Assessment Comment: Patient arrived with an improvement in her gait pattern.  Tolerated progressions with ther ex well.  Decreased ADD muscle restrictions. Updated HEP.    Plan:  OP PT Plan  Treatment/Interventions: Cryotherapy, Education/ Instruction, Electrical stimulation, Gait training, Hot pack, Manual therapy, Neuromuscular re-education, Self care/ home management, Taping techniques, Therapeutic activities, Therapeutic exercises  PT Plan: Skilled PT  PT Frequency: 2 times per week  Duration: 8-10 weeks, reducing frequency as goals are met  Onset Date: 02/27/24  Number of Treatments Authorized: 3/40  Rehab Potential: Good  Plan of Care Agreement: Patient    Goals:  Active       R ULYSSES       STG/LTG       Start:  04/25/24    Expected End:  07/04/24       STG  1) Patient will improve LEFS score by 9 points in order to perform functional activities at home and in the community in 4 " weeks.  2) Patient will be able to complete ADLs with pain less than 3/10 in hip in 4 weeks.  3) Pt will improve hip AROM flexion to 90 degrees to be able to complete ADLs with less difficulty in 4 weeks.   4) Patient will be independent with HEP to allow for continued improvement in daily tasks at home and in the community in 3 visits.   LTG  1) Patient will have >/=4+/5 strength in lateral hip musculature to aid in stability with ambulation on varied surfaces in community in 8 weeks.  2) Patient will be able to perform proper squatting technique in order to prevent increased pain with daily tasks in 8 weeks.  3) Patient will be able to perform >10 seconds of SLS on even ground in order to allow for safe ambulation and reduced fall risk within the community in 8 weeks.   4) Patient will improve LEFS score by to >/=55/80 in order to perform functional activities at home and in the community by discharge.  5) Pt will return to gym or walking regimen for wellness per goal by discharge.               Rosalie Roca, PTA

## 2024-05-06 ENCOUNTER — OFFICE VISIT (OUTPATIENT)
Dept: ORTHOPEDIC SURGERY | Facility: CLINIC | Age: 64
End: 2024-05-06
Payer: COMMERCIAL

## 2024-05-06 ENCOUNTER — HOSPITAL ENCOUNTER (OUTPATIENT)
Dept: RADIOLOGY | Facility: EXTERNAL LOCATION | Age: 64
Discharge: HOME | End: 2024-05-06

## 2024-05-06 DIAGNOSIS — M17.0 PRIMARY OSTEOARTHRITIS OF BOTH KNEES: Primary | ICD-10-CM

## 2024-05-06 PROCEDURE — 20611 DRAIN/INJ JOINT/BURSA W/US: CPT | Performed by: STUDENT IN AN ORGANIZED HEALTH CARE EDUCATION/TRAINING PROGRAM

## 2024-05-06 PROCEDURE — 3044F HG A1C LEVEL LT 7.0%: CPT | Performed by: STUDENT IN AN ORGANIZED HEALTH CARE EDUCATION/TRAINING PROGRAM

## 2024-05-06 PROCEDURE — 99214 OFFICE O/P EST MOD 30 MIN: CPT | Performed by: STUDENT IN AN ORGANIZED HEALTH CARE EDUCATION/TRAINING PROGRAM

## 2024-05-06 PROCEDURE — 3048F LDL-C <100 MG/DL: CPT | Performed by: STUDENT IN AN ORGANIZED HEALTH CARE EDUCATION/TRAINING PROGRAM

## 2024-05-06 RX ORDER — LIDOCAINE HYDROCHLORIDE 10 MG/ML
2 INJECTION, SOLUTION EPIDURAL; INFILTRATION; INTRACAUDAL; PERINEURAL
Status: COMPLETED | OUTPATIENT
Start: 2024-05-06 | End: 2024-05-06

## 2024-05-06 RX ADMIN — LIDOCAINE HYDROCHLORIDE 2 ML: 10 INJECTION, SOLUTION EPIDURAL; INFILTRATION; INTRACAUDAL; PERINEURAL at 11:29

## 2024-05-06 NOTE — PROGRESS NOTES
REFERRAL SOURCE: No ref. provider found     CHIEF COMPLAINT: bilateral knee pain    HISTORY OF PRESENT ILLNESS  Alexis Yap is a very pleasant 63 y.o. female with history of HTN, obesity, GERD, vitamin D deficiency who is here for follow-up of bilateral knee pain.    Previous history:  12/7/23: She last had Euflexxa injections by Dr. Beard with series completed on 3/6/2021 (note reviewed).  Today, she complains of left worse than right knee pain.  Pain can be severe and is located medially.  Denies radiation of the pain.  Pain is worse with activity, especially stairs, and improves with rest.  She has tried over-the-counter and prescription NSAIDs, Tylenol, topical creams, physical therapy, corticosteroid injections in the past without significant or lasting relief.  She was getting Euflexxa injections in the past that lasted 6 months by Dr. Beard.  However, she just stopped getting them hoping that they would last longer, but they have not and her pain has returned to becoming severe.    Interval history:  5/6/23: She is here today for ultrasound-guided bilateral knee viscosupplementation injections.  Since her last visit, she underwent a right hip replacement and has been recovering well.     MEDS    Current Outpatient Medications:     ALPRAZolam (Xanax) 0.5 mg tablet, Take 1 tablet (0.5 mg) by mouth once daily as needed for anxiety., Disp: 20 tablet, Rfl: 0    buPROPion XL (Wellbutrin XL) 300 mg 24 hr tablet, Take 1 tablet (300 mg) by mouth once daily in the morning. Do not crush, chew, or split., Disp: 30 tablet, Rfl: 5    cholecalciferol (Vitamin D-3) 125 MCG (5000 UT) capsule, Take 1 capsule (125 mcg) by mouth once daily., Disp: , Rfl:     hydroCHLOROthiazide (HYDRODiuril) 25 mg tablet, Take 1 tablet (25 mg) by mouth once daily., Disp: 90 tablet, Rfl: 1    ibuprofen 800 mg tablet, Take 1 tablet (800 mg) by mouth every 8 hours if needed for mild pain (1 - 3)., Disp: 30 tablet, Rfl: 0    levothyroxine  (Synthroid, Levoxyl) 125 mcg tablet, Take 1 tablet (125 mcg) by mouth once daily., Disp: 90 tablet, Rfl: 1    magnesium oxide (Mag-Ox) 400 mg tablet, 1 tablet (400 mg) once daily., Disp: , Rfl:     metoprolol tartrate (Lopressor) 50 mg tablet, TAKE 1 TABLET TWICE A DAY (NEED APPOINTMENT, LAST FILL), Disp: 180 tablet, Rfl: 0    ondansetron (Zofran) 4 mg tablet, Take 1 tablet (4 mg) by mouth every 8 hours if needed for nausea or vomiting., Disp: 20 tablet, Rfl: 0    pantoprazole (ProtoNix) 40 mg EC tablet, Take 1 tablet (40 mg) by mouth once daily in the morning. Take before meals. Do not crush, chew, or split., Disp: 30 tablet, Rfl: 0    potassium chloride CR (Klor-Con M20) 20 mEq ER tablet, TAKE 1 TABLET DAILY, Disp: 90 tablet, Rfl: 1    rosuvastatin (Crestor) 10 mg tablet, Take 1 tablet (10 mg) by mouth once daily., Disp: 90 tablet, Rfl: 1    saccharomyces boulardii (Florastor) 250 mg capsule, Take 1 capsule (250 mg) by mouth once daily., Disp: , Rfl:     TURMERIC ORAL, Take 1 capsule by mouth once daily., Disp: , Rfl:     ALLERGIES  Allergies   Allergen Reactions    Butalbital-Acetaminop-Caf-Cod Shortness of breath     severe stomach pain-came into the ED  Oct 17 pt states he did go to ED but felt SOB and could not catch her breath    Butalbital-Acetaminophen-Caff Shortness of breath    Codeine Shortness of breath    Anaprox [Naproxen Sodium] Rash       PAST MEDICAL HISTORY  Past Medical History:   Diagnosis Date    Acute sinusitis, unspecified 08/19/2013    Acute sinusitis    Allergy status to unspecified drugs, medicaments and biological substances     History of allergy    Anxiety     Arthritis     Chronic bronchitis (Multi)     Depression     Encounter for other general examination     Podiatry visit, routine    GERD (gastroesophageal reflux disease)     Hallux rigidus, unspecified foot     Hallux rigidus    Hyperlipidemia     Hypertension     Hypothyroidism     Irritable bowel syndrome     Other gastritis  without bleeding 08/11/2016    Bile reflux gastritis    Other specified anxiety disorders     Depression with anxiety    Pain in left ankle and joints of left foot     Ankle pain, left    Personal history of other diseases of the musculoskeletal system and connective tissue 08/19/2013    History of backache    Personal history of other diseases of the musculoskeletal system and connective tissue     History of tendinitis    Personal history of other diseases of the nervous system and sense organs     History of migraine headaches    Personal history of other diseases of the respiratory system 08/19/2013    History of acute bronchitis    Personal history of other endocrine, nutritional and metabolic disease 08/19/2013    History of hypothyroidism    Personal history of other specified conditions     History of urinary frequency       PAST SURGICAL HISTORY  Past Surgical History:   Procedure Laterality Date    BLADDER SURGERY  09/20/2017    Bladder Surgery    BUNIONECTOMY Right 09/20/2017    Simple Bunion Exostectomy ([facility] Procedure)    GALLBLADDER SURGERY  09/20/2017    Gallbladder Surgery    HEMORRHOID SURGERY  09/20/2017    Hemorrhoidectomy    HYSTERECTOMY  09/20/2017    Hysterectomy    JOINT REPLACEMENT Left     THR    OTHER SURGICAL HISTORY      rectal polyp cancer/ removed    TONSILLECTOMY  08/19/2013    Tonsillectomy       SOCIAL HISTORY   Social History     Socioeconomic History    Marital status:      Spouse name: Not on file    Number of children: Not on file    Years of education: Not on file    Highest education level: Not on file   Occupational History    Not on file   Tobacco Use    Smoking status: Never    Smokeless tobacco: Never   Vaping Use    Vaping status: Never Used   Substance and Sexual Activity    Alcohol use: Yes     Alcohol/week: 1.0 standard drink of alcohol     Types: 1 Glasses of wine per week     Comment: occ wine every few weeks    Drug use: Never    Sexual activity: Defer    Other Topics Concern    Not on file   Social History Narrative    Not on file     Social Determinants of Health     Financial Resource Strain: Low Risk  (2/27/2024)    Overall Financial Resource Strain (CARDIA)     Difficulty of Paying Living Expenses: Not hard at all   Food Insecurity: Not on file   Transportation Needs: No Transportation Needs (3/21/2024)    OASIS : Transportation     Lack of Transportation (Medical): No     Lack of Transportation (Non-Medical): No     Patient Unable or Declines to Respond: No   Physical Activity: Not on file   Stress: Not on file   Social Connections: Feeling Socially Integrated (3/21/2024)    OASIS : Social Isolation     Frequency of experiencing loneliness or isolation: Never   Intimate Partner Violence: Not on file   Housing Stability: Low Risk  (2/27/2024)    Housing Stability Vital Sign     Unable to Pay for Housing in the Last Year: No     Number of Places Lived in the Last Year: 1     Unstable Housing in the Last Year: No       FAMILY HISTORY  Family History   Problem Relation Name Age of Onset    Colon cancer Mother         REVIEW OF SYSTEMS  Except for those mentioned in the history of present illness, and below, a complete review of systems is negative.     Review of Systems    VITALS  There were no vitals filed for this visit.    PHYSICAL EXAMINATION   GENERAL:  Awake, alert, and oriented, no apparent distress, pleasant, and cooperative  PSYC: Mood is euthymic, affect is congruent  EAR, NOSE, THROAT:  Normocephalic, atraumatic, moist membranes, anicteric sclera  LUNG: Nonlabored breathing  HEART: No clubbing or cyanosis  SKIN: No increased erythema, warmth, rashes, or concerning skin lesions  NEURO: Sensation is intact in the bilateral lower extremities. Strength is grossly 5 out of 5 throughout the bilateral lower extremities, unless noted below.  GAIT: Antalgic  MUSCULOSKELETAL: Examination of the bilateral knee: Range of motion is 0-120.  No effusion.   Tender to palpation over the medial lateral joint lines.  No other areas of tenderness about the knee.    IMAGING STUDIES:     Radiographs of the right hip dated 12/7/2023 - severe right hip osteoarthrosis.    Radiographs of bilateral knees dated 12/7/2023 - moderate/severe bilateral medial compartment joint space narrowing.    IMPRESSION  #1  Acute exacerbation of chronic bilateral knee osteoarthritis    PLAN  The following was discussed with the patient:     Alexis Yap is a very pleasant 63 y.o. female with history of HTN, obesity, GERD, vitamin D deficiency who is here for evaluation of bilateral knee pain and right hip pain due to acute exacerbation of chronic bilateral knee osteoarthritis.  -The diagnosis of knee arthritis was discussed in detail. The only cure for arthritis is a knee replacement. Without curing arthritis, we can improve the pain that the patient experiences and hopefully allow them to continue to do the activities that they enjoy.  -Physical therapy: Work on hip abductor, knee extensor, core strengthening and flexibility with PT. Continue home exercise program.  -Weight loss and physical activity: The benefits of weight loss and physical activity on improving pain experienced with arthritis were discussed.  -Bracing: Knee bracing can be considered.  -Medications: She has tried Tylenol, oral, topical NSAIDs without significant relief.  -Injections: Injections, such as corticosteroid, viscosupplementation, and PRP can be utilized. The pros, cons, risks, benefits, pre-procedure and post-procedure protocols were discussed.  She is gotten great relief with Euflexxa injections in the past. She underwent bilateral Euflexxa injections today under ultrasound guidance.  Please see procedure note section for complete details.  -Follow-up for next injection under ultrasound guidance.     The patient was counseled to remain active, but avoid activities that worsen symptoms. The patient was in  agreement with this plan. All questions were answered to the best of my ability.    PATIENT EDUCATION:  Education was discussed at today's appointment. A learning needs assessment was performed.    Primary learner: Alexis Yap  Barriers to learning: None  Preferred language: English  Learning preferences include: Seeing and doing.  Discussed: Diagnosis and treatment plan.  Demonstrated: Understanding of material discussed.  Patient education materials given: None.  Learner response: Learner demonstrated understanding.    This note was dictated using Dragon speech recognition software and was not corrected for spelling or grammatical errors.      Large Jt Asp/Inj: bilateral knee on 5/6/2024 11:29 AM  Details: ultrasound-guided  Medications (Right): 2 mL lidocaine PF 10 mg/mL (1 %); 20 mg sodium hyaluronate 10 mg/mL(mw 2.4 -3.6 million)  Medications (Left): 2 mL lidocaine PF 10 mg/mL (1 %); 20 mg sodium hyaluronate 10 mg/mL(mw 2.4 -3.6 million)    Pre-Procedure Diagnosis: right and left knee pain, right and left knee OA  Post-Procedure Diagnosis: right and left knee pain, right and left knee OA    Procedure: US-guided right and left knee viscosupplementation injections 1/3    History of Present Illness: Alexis Yap is a pleasant 63 y.o. female with bilateral knee pain secondary to osteoarthritis who is here for the above procedure for improved pain control.    Medications and allergies were reviewed with the patient. No contraindications were identified.    Informed Consent:   Following denial of allergy and review of potential side effects and complications including but not necessarily limited to infection, allergic reaction, local tissue breakdown, injury to soft tissue and/or nerves and seizure, the patient indicated understanding and agreed to proceed. Written consent to treatment was obtained and the patient verbalized consent for the procedure.    Procedural Details  The use of direct ultrasound  visualization of the needle (rather than a non-guided injection) was required to increase patient safety by excluding inadvertent intramuscular or intratendinous placement and minimizing bleeding by avoiding osteochondral or vascular injury from the needle.  Additionally, the increased accuracy of placement may increase clinical effectiveness and will allow higher diagnostic specificity when evaluating effectiveness of this injection. All ultrasound images were annotated and saved on the performing ultrasound machine and uploaded into PACS.    Using ultrasound, a pre-scan of the region was performed to identify the target structure.     The area was prepped with chlorhexidine, then re-examined using the same transducer, a sterile ultrasound transducer cover, and sterile ultrasound gel.    Procedural pause conducted to verify: Correct patient identity, procedure to be performed, and as applicable, correct side and site, correct patient position, and availability of implants, special equipment, or special requirements.    Procedure: RIGHT KNEE  Transducer: Linear array transducer.  Patient position: Supine with the knee bent to 30 degrees.   Localization process: The suprapatellar recess was localized in a short axis view.   Local anesthesia: None.  Needle: A 25-gauge, 1.5-inch needle was used for the injectate.   Approach: A lateral to medial, in plane, approach was used to guide the needle tip into the right suprapatellar recess deep to the quadriceps tendon and superficial to the prefemoral fat pad.   Injection/Aspiration: 1 vial of Euflexxa was injected into the right knee joint without complication.     Procedure: LEFT KNEE  Transducer: Linear array transducer.  Patient position: Supine with the knee bent to 30 degrees.   Localization process: The suprapatellar recess was localized in a short axis view.   Local anesthesia: None.  Needle: A 25-gauge, 1.5-inch needle was used for the injectate.   Approach: A  lateral to medial, in plane, approach was used to guide the needle tip into the left suprapatellar recess deep to the quadriceps tendon and superficial to the prefemoral fat pad.   Injection/Aspiration: 1 vial of Euflexxa was injected into the left knee joint without complication.     Post-procedural care: The patient tolerated the procedure well. The patient was asked to ice for improved pain control and avoid submerging the area in water for the next 48 hours to help reduce the risk of infection. The patient was instructed to call the office immediately if there are any questions or concerns.    PATIENT EDUCATION:  Education of the diagnosis and treatment plan was discussed at today's appointment. A learning needs assessment was performed. The patient demonstrated understanding.

## 2024-05-08 ENCOUNTER — TREATMENT (OUTPATIENT)
Dept: PHYSICAL THERAPY | Facility: CLINIC | Age: 64
End: 2024-05-08
Payer: COMMERCIAL

## 2024-05-08 DIAGNOSIS — Z96.641 S/P TOTAL RIGHT HIP ARTHROPLASTY: ICD-10-CM

## 2024-05-08 DIAGNOSIS — M25.551 PAIN OF RIGHT HIP: Primary | ICD-10-CM

## 2024-05-08 PROCEDURE — 97140 MANUAL THERAPY 1/> REGIONS: CPT | Mod: GP,CQ

## 2024-05-08 PROCEDURE — 97530 THERAPEUTIC ACTIVITIES: CPT | Mod: GP,CQ

## 2024-05-08 PROCEDURE — 97110 THERAPEUTIC EXERCISES: CPT | Mod: GP,CQ

## 2024-05-08 ASSESSMENT — PAIN - FUNCTIONAL ASSESSMENT: PAIN_FUNCTIONAL_ASSESSMENT: 0-10

## 2024-05-08 ASSESSMENT — PAIN SCALES - GENERAL: PAINLEVEL_OUTOF10: 0 - NO PAIN

## 2024-05-10 ENCOUNTER — APPOINTMENT (OUTPATIENT)
Dept: PHYSICAL THERAPY | Facility: CLINIC | Age: 64
End: 2024-05-10
Payer: COMMERCIAL

## 2024-05-13 ENCOUNTER — OFFICE VISIT (OUTPATIENT)
Dept: ORTHOPEDIC SURGERY | Facility: CLINIC | Age: 64
End: 2024-05-13
Payer: COMMERCIAL

## 2024-05-13 ENCOUNTER — HOSPITAL ENCOUNTER (OUTPATIENT)
Dept: RADIOLOGY | Facility: EXTERNAL LOCATION | Age: 64
Discharge: HOME | End: 2024-05-13

## 2024-05-13 DIAGNOSIS — M17.0 PRIMARY OSTEOARTHRITIS OF BOTH KNEES: Primary | ICD-10-CM

## 2024-05-13 PROCEDURE — 3044F HG A1C LEVEL LT 7.0%: CPT | Performed by: STUDENT IN AN ORGANIZED HEALTH CARE EDUCATION/TRAINING PROGRAM

## 2024-05-13 PROCEDURE — 3048F LDL-C <100 MG/DL: CPT | Performed by: STUDENT IN AN ORGANIZED HEALTH CARE EDUCATION/TRAINING PROGRAM

## 2024-05-13 PROCEDURE — 20611 DRAIN/INJ JOINT/BURSA W/US: CPT | Performed by: STUDENT IN AN ORGANIZED HEALTH CARE EDUCATION/TRAINING PROGRAM

## 2024-05-13 RX ORDER — LIDOCAINE HYDROCHLORIDE 10 MG/ML
2 INJECTION, SOLUTION EPIDURAL; INFILTRATION; INTRACAUDAL; PERINEURAL
Status: COMPLETED | OUTPATIENT
Start: 2024-05-13 | End: 2024-05-13

## 2024-05-13 RX ADMIN — LIDOCAINE HYDROCHLORIDE 2 ML: 10 INJECTION, SOLUTION EPIDURAL; INFILTRATION; INTRACAUDAL; PERINEURAL at 10:08

## 2024-05-13 NOTE — PROGRESS NOTES
Large Jt Asp/Inj: bilateral knee on 5/13/2024 10:08 AM  Details: ultrasound-guided  Medications (Right): 2 mL lidocaine PF 10 mg/mL (1 %); 20 mg sodium hyaluronate 10 mg/mL(mw 2.4 -3.6 million)  Medications (Left): 2 mL lidocaine PF 10 mg/mL (1 %); 20 mg sodium hyaluronate 10 mg/mL(mw 2.4 -3.6 million)    Pre-Procedure Diagnosis: right and left knee pain, right and left knee OA  Post-Procedure Diagnosis: right and left knee pain, right and left knee OA     Procedure: US-guided right and left knee viscosupplementation injections 2/3     History of Present Illness: Alexis Yap is a pleasant 63 y.o. female with bilateral knee pain secondary to osteoarthritis who is here for the above procedure for improved pain control.     Medications and allergies were reviewed with the patient. No contraindications were identified.     Informed Consent:   Following denial of allergy and review of potential side effects and complications including but not necessarily limited to infection, allergic reaction, local tissue breakdown, injury to soft tissue and/or nerves and seizure, the patient indicated understanding and agreed to proceed. Written consent to treatment was obtained and the patient verbalized consent for the procedure.     Procedural Details  The use of direct ultrasound visualization of the needle (rather than a non-guided injection) was required to increase patient safety by excluding inadvertent intramuscular or intratendinous placement and minimizing bleeding by avoiding osteochondral or vascular injury from the needle.  Additionally, the increased accuracy of placement may increase clinical effectiveness and will allow higher diagnostic specificity when evaluating effectiveness of this injection. All ultrasound images were annotated and saved on the performing ultrasound machine and uploaded into PACS.     Using ultrasound, a pre-scan of the region was performed to identify the target structure.      The area was  prepped with chlorhexidine, then re-examined using the same transducer, a sterile ultrasound transducer cover, and sterile ultrasound gel.     Procedural pause conducted to verify: Correct patient identity, procedure to be performed, and as applicable, correct side and site, correct patient position, and availability of implants, special equipment, or special requirements.     Procedure: RIGHT KNEE  Transducer: Linear array transducer.  Patient position: Supine with the knee bent to 30 degrees.   Localization process: The suprapatellar recess was localized in a short axis view.   Local anesthesia: Cold spray.   Needle: A 25-gauge, 1.5-inch needle was used for the injectate.   Approach: A lateral to medial, in plane, approach was used to guide the needle tip into the right suprapatellar recess deep to the quadriceps tendon and superficial to the prefemoral fat pad.   Injection/Aspiration: 1 vial of Euflexxa was injected into the right knee joint without complication.      Procedure: LEFT KNEE  Transducer: Linear array transducer.  Patient position: Supine with the knee bent to 30 degrees.   Localization process: The suprapatellar recess was localized in a short axis view.   Local anesthesia: Cold spray.  Needle: A 25-gauge, 1.5-inch needle was used for the injectate.   Approach: A lateral to medial, in plane, approach was used to guide the needle tip into the left suprapatellar recess deep to the quadriceps tendon and superficial to the prefemoral fat pad.   Injection/Aspiration: 1 vial of Euflexxa was injected into the left knee joint without complication.      Post-procedural care: The patient tolerated the procedure well. The patient was asked to ice for improved pain control and avoid submerging the area in water for the next 48 hours to help reduce the risk of infection. The patient was instructed to call the office immediately if there are any questions or concerns.     PATIENT EDUCATION:  Education of the  diagnosis and treatment plan was discussed at today's appointment. A learning needs assessment was performed. The patient demonstrated understanding.

## 2024-05-15 ENCOUNTER — TREATMENT (OUTPATIENT)
Dept: PHYSICAL THERAPY | Facility: CLINIC | Age: 64
End: 2024-05-15
Payer: COMMERCIAL

## 2024-05-15 DIAGNOSIS — M25.551 PAIN OF RIGHT HIP: ICD-10-CM

## 2024-05-15 PROCEDURE — 97140 MANUAL THERAPY 1/> REGIONS: CPT | Mod: GP | Performed by: PHYSICAL THERAPIST

## 2024-05-15 PROCEDURE — 97110 THERAPEUTIC EXERCISES: CPT | Mod: GP | Performed by: PHYSICAL THERAPIST

## 2024-05-15 ASSESSMENT — PAIN - FUNCTIONAL ASSESSMENT: PAIN_FUNCTIONAL_ASSESSMENT: 0-10

## 2024-05-15 ASSESSMENT — PAIN SCALES - GENERAL: PAINLEVEL_OUTOF10: 0 - NO PAIN

## 2024-05-15 NOTE — PROGRESS NOTES
"  Physical Therapy Treatment    Patient Name: Alexis Yap  MRN: [id number]  Today's Date: 5/15/2024  Time Calculation  Start Time: 1145  Stop Time: 1232  Time Calculation (min): 47 min  PT Therapeutic Procedures Time Entry  Manual Therapy Time Entry: 10  Therapeutic Exercise Time Entry: 35       Current Problem  1. Pain of right hip  Follow Up In Physical Therapy          Insurance:  Number of Treatments Authorized: 4/40          Subjective   General  Reason for Referral: R hip pain/stiffness s/p ULYSSES on 2/27/24 with posterior approach  Referred By: Maria Teresa  Past Medical History Relevant to Rehab: L ULYSSES - august 2020; hx of knee pain, R foot bunyon removal; LBP; recent R shoulder pain following ULYSSES surgery (reviewed medical health history form - [facility])  General Comment: Pt reports she's doing a little better overall. Hasn't had as much time for her exercises. Had injections for her knees. More stiffness in knees/back than anything.    Performing HEP?: Partially    Precautions  Precautions  STEADI Fall Risk Score (The score of 4 or more indicates an increased risk of falling): 0  Precautions Comment: Posterior ULYSSES precautions - no fall risk  Pain  Pain Assessment: 0-10  Pain Score: 0 - No pain  Pain Location: Hip  Pain Orientation: Right    Objective   Mild trendelenburg in R stance - however improved    Treatments:  Therapeutic Exercise  Therapeutic Exercise Activity 1: SciFit Man L2 x 5 min  Therapeutic Exercise Activity 2: Dynamics SBA: high knees x2, butt kicks x 2, tin soldiers, fwd lunges mini x 40'  Therapeutic Exercise Activity 3: Heel raises x 1 min  Therapeutic Exercise Activity 4: Gastroc stretch at wall x 30\" each, staggered x 30\" each  Therapeutic Exercise Activity 5: R,L hip abduction yellow loop at ankles x 12\" each  Therapeutic Exercise Activity 6: R,L hip diagonal extension yellow loop x 10 each  Therapeutic Exercise Activity 7: R,L straight extension yellow loop at ankles x 10 reps " each  Therapeutic Exercise Activity 8: R,L  hip flexion with 1 UE support x 1 min  Therapeutic Exercise Activity 9: Ambulating 40' x 4 with cues for equal steps and heel strike    Manual Therapy  Manual Therapy Activity 1: R hip flexor/adductor/quad release and scar mobs  Manual Therapy Activity 2: R glute release in s/l    OP EDUCATION:  Outpatient Education  Education Comment: hip strengthening review - red tband    Assessment:  PT Assessment  Assessment Comment: Progressing well but fatiguing throughout strengthening exercises. Pankaj dynamics well with improved hip stability during SLS. Gait improved but needs some verbal cues for equal steps. Less tension and tenderness throughout R glute/scar region.    Plan:  OP PT Plan  Treatment/Interventions: Cryotherapy, Education/ Instruction, Electrical stimulation, Gait training, Hot pack, Manual therapy, Neuromuscular re-education, Self care/ home management, Taping techniques, Therapeutic activities, Therapeutic exercises  PT Plan: Skilled PT (R hip mobility and LQ strengthening)  PT Frequency: 2 times per week  Duration: 8-10 weeks, reducing frequency as goals are met  Onset Date: 02/27/24  Number of Treatments Authorized: 4/40  Rehab Potential: Good  Plan of Care Agreement: Patient    Goals:  Active       R ULYSSES       STG/LTG       Start:  04/25/24    Expected End:  07/04/24       STG  1) Patient will improve LEFS score by 9 points in order to perform functional activities at home and in the community in 4 weeks.  2) Patient will be able to complete ADLs with pain less than 3/10 in hip in 4 weeks.  3) Pt will improve hip AROM flexion to 90 degrees to be able to complete ADLs with less difficulty in 4 weeks.   4) Patient will be independent with HEP to allow for continued improvement in daily tasks at home and in the community in 3 visits.   LTG  1) Patient will have >/=4+/5 strength in lateral hip musculature to aid in stability with ambulation on varied surfaces in  community in 8 weeks.  2) Patient will be able to perform proper squatting technique in order to prevent increased pain with daily tasks in 8 weeks.  3) Patient will be able to perform >10 seconds of SLS on even ground in order to allow for safe ambulation and reduced fall risk within the community in 8 weeks.   4) Patient will improve LEFS score by to >/=55/80 in order to perform functional activities at home and in the community by discharge.  5) Pt will return to gym or walking regimen for wellness per goal by discharge.               Ligia Goel, PT

## 2024-05-17 ENCOUNTER — TREATMENT (OUTPATIENT)
Dept: PHYSICAL THERAPY | Facility: CLINIC | Age: 64
End: 2024-05-17
Payer: COMMERCIAL

## 2024-05-17 DIAGNOSIS — M25.551 PAIN OF RIGHT HIP: ICD-10-CM

## 2024-05-17 PROCEDURE — 97112 NEUROMUSCULAR REEDUCATION: CPT | Mod: GP,CQ

## 2024-05-17 PROCEDURE — 97110 THERAPEUTIC EXERCISES: CPT | Mod: GP,CQ

## 2024-05-17 ASSESSMENT — PAIN - FUNCTIONAL ASSESSMENT: PAIN_FUNCTIONAL_ASSESSMENT: 0-10

## 2024-05-17 ASSESSMENT — PAIN SCALES - GENERAL: PAINLEVEL_OUTOF10: 0 - NO PAIN

## 2024-05-17 NOTE — PROGRESS NOTES
Physical Therapy Treatment    Patient Name: Alexis Yap  MRN: [id number]  Today's Date: 5/17/2024  Time Calculation  Start Time: 1037  Stop Time: 1118  Time Calculation (min): 41 min  PT Therapeutic Procedures Time Entry  Manual Therapy Time Entry: 6  Neuromuscular Re-Education Time Entry: 10  Therapeutic Exercise Time Entry: 25,      Current Problem  1. Pain of right hip  Follow Up In Physical Therapy            Insurance:  Payor: [employer] [facility] / Plan: [employer] / Product Type: *No Product type* /   Number of Treatments Authorized: 6/40          Subjective   General  Reason for Referral: R hip pain/stiffness s/p ULYSSES on 2/27/24 with posterior approach  Referred By: Maria Teresa  Past Medical History Relevant to Rehab: L ULYSSES - august 2020; hx of knee pain, R foot bunyon removal; LBP; recent R shoulder pain following ULYSSES surgery (reviewed medical health history form - RLM, SM)  General Comment: PT STATES SHE IS SORE IN HER BUTTOCKS FROM LAST VISIT.  HER HIP FEELS FINE.    Performing HEP?: Yes    Precautions  Precautions  Precautions Comment: Posterior ULYSSES precautions - no fall risk  Pain  Pain Assessment: 0-10  Pain Score: 0 - No pain  Pain Location: Hip  Pain Orientation: Right    Objective   General Observation  General Observation: NON ANTALGIC GAIT       Treatments:    Therapeutic Exercise  Therapeutic Exercise Activity 1: SciFit Man L3 X 6 MIN  Therapeutic Exercise Activity 2: Dynamics SBA: high knees x2, butt kicks x 2, tin soldiers, fwd lunges mini x 40'  Therapeutic Exercise Activity 3: Heel raises x 1 min  Therapeutic Exercise Activity 4: GASTROC STRETCH X 2 MIN  Therapeutic Exercise Activity 5: SQUATS // BAR X 1 MIN  Therapeutic Exercise Activity 6: FOOTWORM ALONG // BAR YELLOW LOOP X 2 MIN    Balance/Neuromuscular Re-Education  Balance/Neuromuscular Re-Education Activity 1: AIREX BEAM BALANCING X 2 MIN  Balance/Neuromuscular Re-Education Activity 2: AIREX BEAM SIDE STEP // BAR X 2  "MIN  Balance/Neuromuscular Re-Education Activity 3: TILT BOARD F/B, S/S TAPS // BAR X 2 MIN EACH  Balance/Neuromuscular Re-Education Activity 4: 6\" FWD STEP UPS 2 X 1 MIN // BAR    Manual Therapy  Manual Therapy Activity 1: MFR R LEP PULL  Manual Therapy Activity 2: STRETCH R HAM  Manual Therapy Activity 3: PROM R HIP FLEX , ER                   OP EDUCATION:  Outpatient Education  Education Comment: CONTINUE WITH CURRENT HEP    Assessment:  PT Assessment  Assessment Comment: PT JOSE EX'S WELL. SHE CONTINUES TO PROGRESS WITH HER STRENGTH AND BALANCE.  NO C/O PAIN, JUST FATIGUE.  PT IS PROGRESSING TOWARDS GOALS.    Plan:  OP PT Plan  Treatment/Interventions: Cryotherapy, Education/ Instruction, Electrical stimulation, Gait training, Hot pack, Manual therapy, Neuromuscular re-education, Self care/ home management, Taping techniques, Therapeutic activities, Therapeutic exercises  PT Plan: Skilled PT (R hip mobility and LQ strengthening)  PT Frequency: 2 times per week  Duration: 8-10 weeks, reducing frequency as goals are met  Onset Date: 02/27/24  Number of Treatments Authorized: 6/40  Rehab Potential: Good  Plan of Care Agreement: Patient    Goals:  Active       R ULYSSES       STG/LTG       Start:  04/25/24    Expected End:  07/04/24       STG  1) Patient will improve LEFS score by 9 points in order to perform functional activities at home and in the community in 4 weeks.  2) Patient will be able to complete ADLs with pain less than 3/10 in hip in 4 weeks.  3) Pt will improve hip AROM flexion to 90 degrees to be able to complete ADLs with less difficulty in 4 weeks.   4) Patient will be independent with HEP to allow for continued improvement in daily tasks at home and in the community in 3 visits.   LTG  1) Patient will have >/=4+/5 strength in lateral hip musculature to aid in stability with ambulation on varied surfaces in community in 8 weeks.  2) Patient will be able to perform proper squatting technique in order to " prevent increased pain with daily tasks in 8 weeks.  3) Patient will be able to perform >10 seconds of SLS on even ground in order to allow for safe ambulation and reduced fall risk within the community in 8 weeks.   4) Patient will improve LEFS score by to >/=55/80 in order to perform functional activities at home and in the community by discharge.  5) Pt will return to gym or walking regimen for wellness per goal by discharge.               Miguel Baldwin, PTA

## 2024-05-20 ENCOUNTER — HOSPITAL ENCOUNTER (OUTPATIENT)
Dept: RADIOLOGY | Facility: EXTERNAL LOCATION | Age: 64
Discharge: HOME | End: 2024-05-20

## 2024-05-20 ENCOUNTER — OFFICE VISIT (OUTPATIENT)
Dept: ORTHOPEDIC SURGERY | Facility: CLINIC | Age: 64
End: 2024-05-20
Payer: COMMERCIAL

## 2024-05-20 DIAGNOSIS — M17.0 PRIMARY OSTEOARTHRITIS OF BOTH KNEES: Primary | ICD-10-CM

## 2024-05-20 PROCEDURE — 3048F LDL-C <100 MG/DL: CPT | Performed by: STUDENT IN AN ORGANIZED HEALTH CARE EDUCATION/TRAINING PROGRAM

## 2024-05-20 PROCEDURE — 3044F HG A1C LEVEL LT 7.0%: CPT | Performed by: STUDENT IN AN ORGANIZED HEALTH CARE EDUCATION/TRAINING PROGRAM

## 2024-05-20 PROCEDURE — 20611 DRAIN/INJ JOINT/BURSA W/US: CPT | Performed by: STUDENT IN AN ORGANIZED HEALTH CARE EDUCATION/TRAINING PROGRAM

## 2024-05-20 NOTE — PROGRESS NOTES
Large Jt Asp/Inj: bilateral knee on 5/20/2024 10:01 AM  Details: ultrasound-guided  Medications (Right): 20 mg sodium hyaluronate 10 mg/mL(mw 2.4 -3.6 million)  Medications (Left): 20 mg sodium hyaluronate 10 mg/mL(mw 2.4 -3.6 million)    Pre-Procedure Diagnosis: right and left knee pain, right and left knee OA  Post-Procedure Diagnosis: right and left knee pain, right and left knee OA     Procedure: US-guided right and left knee viscosupplementation injections 3/3     History of Present Illness: Alexis Yap is a pleasant 63 y.o. female with bilateral knee pain secondary to osteoarthritis who is here for the above procedure for improved pain control.     Medications and allergies were reviewed with the patient. No contraindications were identified.     Informed Consent:   Following denial of allergy and review of potential side effects and complications including but not necessarily limited to infection, allergic reaction, local tissue breakdown, injury to soft tissue and/or nerves and seizure, the patient indicated understanding and agreed to proceed. Written consent to treatment was obtained and the patient verbalized consent for the procedure.     Procedural Details  The use of direct ultrasound visualization of the needle (rather than a non-guided injection) was required to increase patient safety by excluding inadvertent intramuscular or intratendinous placement and minimizing bleeding by avoiding osteochondral or vascular injury from the needle.  Additionally, the increased accuracy of placement may increase clinical effectiveness and will allow higher diagnostic specificity when evaluating effectiveness of this injection. All ultrasound images were annotated and saved on the performing ultrasound machine and uploaded into PACS.     Using ultrasound, a pre-scan of the region was performed to identify the target structure.      The area was prepped with chlorhexidine, then re-examined using the same  transducer, a sterile ultrasound transducer cover, and sterile ultrasound gel.     Procedural pause conducted to verify: Correct patient identity, procedure to be performed, and as applicable, correct side and site, correct patient position, and availability of implants, special equipment, or special requirements.     Procedure: RIGHT KNEE  Transducer: Linear array transducer.  Patient position: Supine with the knee bent to 30 degrees.   Localization process: The suprapatellar recess was localized in a short axis view.   Local anesthesia: Cold spray.   Needle: A 25-gauge, 1.5-inch needle was used for the injectate.   Approach: A lateral to medial, in plane, approach was used to guide the needle tip into the right suprapatellar recess deep to the quadriceps tendon and superficial to the prefemoral fat pad.   Injection/Aspiration: 1 vial of Euflexxa was injected into the right knee joint without complication.      Procedure: LEFT KNEE  Transducer: Linear array transducer.  Patient position: Supine with the knee bent to 30 degrees.   Localization process: The suprapatellar recess was localized in a short axis view.   Local anesthesia: Cold spray.  Needle: A 25-gauge, 1.5-inch needle was used for the injectate.   Approach: A lateral to medial, in plane, approach was used to guide the needle tip into the left suprapatellar recess deep to the quadriceps tendon and superficial to the prefemoral fat pad.   Injection/Aspiration: 1 vial of Euflexxa was injected into the left knee joint without complication.      Post-procedural care: The patient tolerated the procedure well. The patient was asked to ice for improved pain control and avoid submerging the area in water for the next 48 hours to help reduce the risk of infection. The patient was instructed to call the office immediately if there are any questions or concerns.     PATIENT EDUCATION:  Education of the diagnosis and treatment plan was discussed at today's  appointment. A learning needs assessment was performed. The patient demonstrated understanding.

## 2024-05-23 ENCOUNTER — TREATMENT (OUTPATIENT)
Dept: PHYSICAL THERAPY | Facility: CLINIC | Age: 64
End: 2024-05-23
Payer: COMMERCIAL

## 2024-05-23 DIAGNOSIS — M25.551 PAIN OF RIGHT HIP: ICD-10-CM

## 2024-05-23 PROCEDURE — 97110 THERAPEUTIC EXERCISES: CPT | Mod: GP | Performed by: PHYSICAL THERAPIST

## 2024-05-23 PROCEDURE — 97140 MANUAL THERAPY 1/> REGIONS: CPT | Mod: GP | Performed by: PHYSICAL THERAPIST

## 2024-05-23 ASSESSMENT — PAIN - FUNCTIONAL ASSESSMENT: PAIN_FUNCTIONAL_ASSESSMENT: 0-10

## 2024-05-23 ASSESSMENT — PAIN SCALES - GENERAL: PAINLEVEL_OUTOF10: 0 - NO PAIN

## 2024-05-29 ENCOUNTER — TREATMENT (OUTPATIENT)
Dept: PHYSICAL THERAPY | Facility: CLINIC | Age: 64
End: 2024-05-29
Payer: COMMERCIAL

## 2024-05-29 DIAGNOSIS — M25.551 PAIN OF RIGHT HIP: ICD-10-CM

## 2024-05-29 PROCEDURE — 97140 MANUAL THERAPY 1/> REGIONS: CPT | Mod: GP | Performed by: PHYSICAL THERAPIST

## 2024-05-29 PROCEDURE — 97110 THERAPEUTIC EXERCISES: CPT | Mod: GP | Performed by: PHYSICAL THERAPIST

## 2024-05-29 PROCEDURE — 97112 NEUROMUSCULAR REEDUCATION: CPT | Mod: GP | Performed by: PHYSICAL THERAPIST

## 2024-05-29 ASSESSMENT — PAIN - FUNCTIONAL ASSESSMENT: PAIN_FUNCTIONAL_ASSESSMENT: 0-10

## 2024-05-29 ASSESSMENT — PAIN SCALES - GENERAL: PAINLEVEL_OUTOF10: 0 - NO PAIN

## 2024-05-29 NOTE — PROGRESS NOTES
"  Physical Therapy Treatment    Patient Name: Alexis Yap  MRN: [id number]  Today's Date: 5/29/2024  Time Calculation  Start Time: 1120  Stop Time: 1202  Time Calculation (min): 42 min  PT Therapeutic Procedures Time Entry  Manual Therapy Time Entry: 9  Neuromuscular Re-Education Time Entry: 8  Therapeutic Exercise Time Entry: 23       Current Problem  1. Pain of right hip  Follow Up In Physical Therapy          Insurance:  Number of Treatments Authorized: 8/40          Subjective   General  Reason for Referral: R hip pain/stiffness s/p ULYSSES on 2/27/24 with posterior approach  Referred By: Maria Teresa  Past Medical History Relevant to Rehab: L ULYSSES - august 2020; hx of knee pain, R foot bunyon removal; LBP; recent R shoulder pain following ULYSSES surgery (reviewed medical health history form - RLM, SM)  General Comment: Pt reports she fell on saturday and bruised left knee. Tripped on step with slippers. Hip felt fine and no pain following. Has been doing step ups and working on strengthening. Pt also has been having L foot pain since she dropped a pickle jar on her foot.    Performing HEP?: Yes    Precautions  Precautions  STEADI Fall Risk Score (The score of 4 or more indicates an increased risk of falling): 0  Precautions Comment: Posterior ULYSSES precautions - no fall risk  Pain  Pain Assessment: 0-10  Pain Score: 0 - No pain  Pain Location: Hip  Pain Orientation: Right    Objective   (-) R hip trendelenburg (more L hip trendelenburg)     Treatments:  Therapeutic Exercise  Therapeutic Exercise Activity 1: SciFit Man L3 x 5 min  Therapeutic Exercise Activity 2: R,L hamstring stretch x 45\" each  Therapeutic Exercise Activity 3: Dynamics: high knees, butt kicks, tin soldiers x 2, mini lunges x 2, x 40'  Therapeutic Exercise Activity 4: Sit<>stand from std chair without UE support x 1 min  Therapeutic Exercise Activity 5: R/L lateral step overs 12\" alena x 2 minutes without UE support  Therapeutic Exercise Activity 6: F/B " "step overs 12\" x 1 min  Therapeutic Exercise Activity 7: PROM R hip    Balance/Neuromuscular Re-Education  Balance/Neuromuscular Re-Education Activity 1: R,L SLS x 1 min each  Balance/Neuromuscular Re-Education Activity 2: R,L glute sets 3\" 30 sec x 2  Balance/Neuromuscular Re-Education Activity 3: R/L alt march without UE support x 1 min    Manual Therapy  Manual Therapy Activity 1: Scar mobilizations  Manual Therapy Activity 2: R glute, hip flexor and quad release    OP EDUCATION:  Outpatient Education  Education Comment: soft tissue massage - step ups    Assessment:  PT Assessment  Assessment Comment: Pt continues to progress well under POC with R hip, despite having fall 4 days ago. Some limitations with L knee and L foot pain. Challenged with glute strengthening exercises and SLS. Scar tissue is improving and glute tenderness reducing.    Plan:  OP PT Plan  Treatment/Interventions: Cryotherapy, Education/ Instruction, Electrical stimulation, Gait training, Hot pack, Manual therapy, Neuromuscular re-education, Self care/ home management, Taping techniques, Therapeutic activities, Therapeutic exercises  PT Plan: Skilled PT (R hip mobility and LQ strengthening)  PT Frequency: 1 time per week  Duration: 8-10 weeks, reducing frequency as goals are met (discharge in 2 visits)  Onset Date: 02/27/24  Number of Treatments Authorized: 8/40  Rehab Potential: Good  Plan of Care Agreement: Patient    Goals:  Active       R ULYSSES       STG/LTG (Progressing)       Start:  04/25/24    Expected End:  07/04/24       STG  1) Patient will improve LEFS score by 9 points in order to perform functional activities at home and in the community in 4 weeks.  2) Patient will be able to complete ADLs with pain less than 3/10 in hip in 4 weeks.  3) Pt will improve hip AROM flexion to 90 degrees to be able to complete ADLs with less difficulty in 4 weeks.   4) Patient will be independent with HEP to allow for continued improvement in daily " tasks at home and in the community in 3 visits.   LTG  1) Patient will have >/=4+/5 strength in lateral hip musculature to aid in stability with ambulation on varied surfaces in community in 8 weeks.  2) Patient will be able to perform proper squatting technique in order to prevent increased pain with daily tasks in 8 weeks.  3) Patient will be able to perform >10 seconds of SLS on even ground in order to allow for safe ambulation and reduced fall risk within the community in 8 weeks.   4) Patient will improve LEFS score by to >/=55/80 in order to perform functional activities at home and in the community by discharge.  5) Pt will return to gym or walking regimen for wellness per goal by discharge.               Ligia Goel, PT

## 2024-06-06 ENCOUNTER — TREATMENT (OUTPATIENT)
Dept: PHYSICAL THERAPY | Facility: CLINIC | Age: 64
End: 2024-06-06
Payer: COMMERCIAL

## 2024-06-06 DIAGNOSIS — M25.551 PAIN OF RIGHT HIP: ICD-10-CM

## 2024-06-06 PROCEDURE — 97110 THERAPEUTIC EXERCISES: CPT | Mod: GP | Performed by: PHYSICAL THERAPIST

## 2024-06-06 PROCEDURE — 97140 MANUAL THERAPY 1/> REGIONS: CPT | Mod: GP | Performed by: PHYSICAL THERAPIST

## 2024-06-06 PROCEDURE — 97530 THERAPEUTIC ACTIVITIES: CPT | Mod: GP | Performed by: PHYSICAL THERAPIST

## 2024-06-06 ASSESSMENT — PAIN - FUNCTIONAL ASSESSMENT: PAIN_FUNCTIONAL_ASSESSMENT: 0-10

## 2024-06-06 ASSESSMENT — PAIN SCALES - GENERAL: PAINLEVEL_OUTOF10: 0 - NO PAIN

## 2024-06-06 NOTE — PROGRESS NOTES
Physical Therapy Treatment    Patient Name: Alexis Yap  MRN: [id number]  Today's Date: 6/6/2024  Time Calculation  Start Time: 1130  Stop Time: 1215  Time Calculation (min): 45 min  PT Therapeutic Procedures Time Entry  Manual Therapy Time Entry: 11  Therapeutic Exercise Time Entry: 22  Therapeutic Activity Time Entry: 11       Current Problem  1. Pain of right hip  Follow Up In Physical Therapy          Insurance:  Number of Treatments Authorized: 9/40          Subjective   General  Reason for Referral: R hip pain/stiffness s/p ULYSSES on 2/27/24 with posterior approach  Referred By: Maria Teresa  Past Medical History Relevant to Rehab: L ULYSSES - august 2020; hx of knee pain, R foot bunyon removal; LBP; recent R shoulder pain following ULYSSES surgery (reviewed medical health history form - RLM, SM)  General Comment: Pt reports she's doing well overall - notes still has pain on top of L foot from dropping pickle jar on foot a few weeks ago. Notes hip is getting stronger with stairs.    Performing HEP?: Yes    Precautions  Precautions  STEADI Fall Risk Score (The score of 4 or more indicates an increased risk of falling): 0  Precautions Comment: Posterior ULYSSES precautions - no fall risk  Pain  Pain Assessment: 0-10  Pain Score: 0 - No pain  Pain Location: Hip  Pain Orientation: Right    Objective   Mild trendelenburg present - mod UE support needed with hip exercises and step ups    Treatments:  Therapeutic Exercise  Therapeutic Exercise Activity 1: SciFit Man L3 x 5 min  Therapeutic Exercise Activity 2: Dynamics: high knees, butt kicks, tin soldiers x 2, x 40'  Therapeutic Exercise Activity 3: Gastroc stretch slantboard x 1 min  Therapeutic Exercise Activity 4: Heel raises x 1 min  Therapeutic Exercise Activity 5: R/L hip abduction green loop at ankles x 1 min alt  Therapeutic Exercise Activity 6: R,L hip diagonal ext yellow loop at ankles x 30 sec each  Therapeutic Exercise Activity 7: R hip flexion knee straight x 30 sec  "yellow loop  Therapeutic Exercise Activity 8: TGL7 B LE squats x 3 min    Therapeutic Activity  Therapeutic Activity 1: R fwd 8\" step ups x 1 min  Therapeutic Activity 2: R lateral 8\" step ups x 1 min  Therapeutic Activity 3: Squats x 1 min; sit<>stands review for HEP  Therapeutic Activity 4: *gym and weight machines for wellness      Manual Therapy  Manual Therapy Activity 1: Scar mobilizations  Manual Therapy Activity 2: R glute, hip flexor and lateral quad release    OP EDUCATION:  Outpatient Education  Education Comment: Access Code: [email or web address]  URL: [email or web address]  Date: 06/06/2024  Prepared by: Ligia Goel    Exercises  - Hip Abduction with Resistance Loop  - 1 x daily - 3 x weekly - 2 sets - 10-15 reps  - Diagonal Hip Extension with Resistance  - 1 x daily - 3 x weekly - 2 sets - 10-15 reps  - Hip Extension with Resistance Loop  - 1 x daily - 3 x weekly - 2 sets - 10-15 reps    Assessment:  PT Assessment  Assessment Comment: Pt showing improvements with higher step ups tolerated. Challenged with hip strengthening with fatigue - reviewed and updated for HEP. Continues with some scar restriction, however much improved overall. Likely appropriate for d/c with HEP next visit.    Plan:  OP PT Plan  Treatment/Interventions: Cryotherapy, Education/ Instruction, Electrical stimulation, Gait training, Hot pack, Manual therapy, Neuromuscular re-education, Self care/ home management, Taping techniques, Therapeutic activities, Therapeutic exercises  PT Plan: Skilled PT (R hip mobility and LQ strengthening)  PT Frequency: 1 time per week  Duration: 8-10 weeks, reducing frequency as goals are met (likely d/c next visit)  Onset Date: 02/27/24  Number of Treatments Authorized: 9/40  Rehab Potential: Good  Plan of Care Agreement: Patient    Goals:  Active       R ULYSSES       STG/LTG (Progressing)       Start:  04/25/24    Expected End:  07/04/24       STG  1) Patient will improve LEFS score by 9 " points in order to perform functional activities at home and in the community in 4 weeks.  2) Patient will be able to complete ADLs with pain less than 3/10 in hip in 4 weeks.  3) Pt will improve hip AROM flexion to 90 degrees to be able to complete ADLs with less difficulty in 4 weeks.   4) Patient will be independent with HEP to allow for continued improvement in daily tasks at home and in the community in 3 visits.   LTG  1) Patient will have >/=4+/5 strength in lateral hip musculature to aid in stability with ambulation on varied surfaces in community in 8 weeks.  2) Patient will be able to perform proper squatting technique in order to prevent increased pain with daily tasks in 8 weeks.  3) Patient will be able to perform >10 seconds of SLS on even ground in order to allow for safe ambulation and reduced fall risk within the community in 8 weeks.   4) Patient will improve LEFS score by to >/=55/80 in order to perform functional activities at home and in the community by discharge.  5) Pt will return to gym or walking regimen for wellness per goal by discharge.               Ligia Goel, PT

## 2024-06-10 ENCOUNTER — TREATMENT (OUTPATIENT)
Dept: PHYSICAL THERAPY | Facility: CLINIC | Age: 64
End: 2024-06-10
Payer: COMMERCIAL

## 2024-06-10 DIAGNOSIS — M25.551 PAIN OF RIGHT HIP: ICD-10-CM

## 2024-06-10 PROCEDURE — 97530 THERAPEUTIC ACTIVITIES: CPT | Mod: GP | Performed by: PHYSICAL THERAPIST

## 2024-06-10 PROCEDURE — 97110 THERAPEUTIC EXERCISES: CPT | Mod: GP | Performed by: PHYSICAL THERAPIST

## 2024-06-10 ASSESSMENT — PAIN SCALES - GENERAL: PAINLEVEL_OUTOF10: 0 - NO PAIN

## 2024-06-10 ASSESSMENT — PAIN - FUNCTIONAL ASSESSMENT: PAIN_FUNCTIONAL_ASSESSMENT: 0-10

## 2024-06-10 NOTE — PROGRESS NOTES
Physical Therapy Progress Note and Discharge    Patient Name: Alexis Yap  MRN: [id number]  Today's Date: 6/10/2024  Time Calculation  Start Time: 1711  Stop Time: 1800  Time Calculation (min): 49 min  PT Therapeutic Procedures Time Entry  Manual Therapy Time Entry: 9  Therapeutic Exercise Time Entry: 26  Therapeutic Activity Time Entry: 12       Current Problem  1. Pain of right hip  Follow Up In Physical Therapy          Insurance:  Number of Treatments Authorized: 10/40 (discharge)          Subjective   General  Reason for Referral: R hip pain/stiffness s/p ULYSSES on 2/27/24 with posterior approach  Referred By: Maria Teresa  Past Medical History Relevant to Rehab: L ULYSSES - august 2020; hx of knee pain, R foot bunyon removal; LBP; recent R shoulder pain following ULYSSES surgery (reviewed medical health history form - RLM, SM)  General Comment: Pt reports she's doing really well with her hip. Notes some discomfort with her L foot and R shld still. Notes doing everything at home.    Performing HEP?: Yes    Precautions  Precautions  STEADI Fall Risk Score (The score of 4 or more indicates an increased risk of falling): 0  Precautions Comment: Posterior ULYSSES precautions - no fall risk  Pain  Pain Assessment: 0-10  Pain Score: 0 - No pain  Pain Location: Hip  Pain Orientation: Right    Objective   AROM Hip (degrees)     Flexion (R,L) 90, 110     Extension (R,L) 10, 10      IR (R,L) neutral, 35     ER (R,L) 35, 40      Abduction (R,L) 30, 45  (-) tenderness at scar   Limited with ROM d/t continued precautions    Outcome Measures:  Other Measures  Lower Extremity Funtional Score (LEFS): 55/80    Treatments:  Therapeutic Exercise  Therapeutic Exercise Activity 1: SciFit Man L3 x 5 min  Therapeutic Exercise Activity 2: Dynamics: high knees, butt kicks, tin soldiers, fwd lunges x 40'  Therapeutic Exercise Activity 3: Gastroc stretch slantboard x 1 min  Therapeutic Exercise Activity 4: Heel raises x 1 min  Therapeutic Exercise  "Activity 5: R,L hip abduction yellow loop at ankles x 30 sec each  Therapeutic Exercise Activity 6: R,L hip diagonal ext yellow loop at ankles x 30 sec each  Therapeutic Exercise Activity 7: R,L hip straight ext yellow loop at ankles x 30 sec each  Therapeutic Exercise Activity 8: TGL7 B LE squats x 3 min  Therapeutic Exercise Activity 9: R/L side stepping with yellow loop at ankles x 40' each    Therapeutic Activity  Therapeutic Activity 1: R fwd 8\" step ups x 1 min  Therapeutic Activity 2: R/L lateral step ups/over 6\" x 1 min  Therapeutic Activity 3: Modified golfers lift to ground demo - review of precautions  Therapeutic Activity 4: 4\" step downs R LE eccentric x 1 min with UE support    Manual Therapy  Manual Therapy Activity 1: Scar mobilizations  Manual Therapy Activity 2: R glute, hip flexor and lateral quad release    OP EDUCATION:  Outpatient Education  Education Comment: Reviewed    Assessment:   Patient is 63 year old evaluated and treated x 10 visits to physical therapy with signs and symptoms consistent with R hip pain and stiffness s/p R ULYSSES on 2/27/24. Progressed well with goals met at this time. Appropriate for discharge with HEP at this time.       Plan:  OP PT Plan  PT Plan: No Additional PT interventions required at this time  Onset Date: 02/27/24  Number of Treatments Authorized: 10/40 (discharge)  Rehab Potential: Good  Plan of Care Agreement: Patient    Goals:  Resolved       R ULYSSES       STG/LTG (Met)       Start:  04/25/24    Expected End:  07/04/24    Resolved:  06/10/24    STG  1) Patient will improve LEFS score by 9 points in order to perform functional activities at home and in the community in 4 weeks.  2) Patient will be able to complete ADLs with pain less than 3/10 in hip in 4 weeks.  3) Pt will improve hip AROM flexion to 90 degrees to be able to complete ADLs with less difficulty in 4 weeks.   4) Patient will be independent with HEP to allow for continued improvement in daily tasks " at home and in the community in 3 visits.   LTG  1) Patient will have >/=4+/5 strength in lateral hip musculature to aid in stability with ambulation on varied surfaces in community in 8 weeks.  2) Patient will be able to perform proper squatting technique in order to prevent increased pain with daily tasks in 8 weeks.  3) Patient will be able to perform >10 seconds of SLS on even ground in order to allow for safe ambulation and reduced fall risk within the community in 8 weeks.   4) Patient will improve LEFS score by to >/=55/80 in order to perform functional activities at home and in the community by discharge.  5) Pt will return to gym or walking regimen for wellness per goal by discharge.               Ligia Goel, PT

## 2024-06-23 NOTE — PROGRESS NOTES
Blanca Morel MD   Adult Reconstruction and Joint Replacement Surgery  Phone: [phone]     Fax: [phone]     HIP REPLACEMENT POSTOPERATIVE VISIT      Name: Alexis Yap  : 1960  Date of Visit: 2024    Procedure: right posterior total hip replacement  Date of Surgery: 2024  Diagnosis: Right hip arthritis    Chief Complaint: Right hip replacement surgery follow-up    History of Present Illness:    The patient is now 3 months 28 days status post right posterior total hip replacement surgery.    She reports excellent improvement in strength and range of motion after recent involvement physical therapy for her right hip replacement.    The patient has no pain.    Currently taking nothing for pain.     The patient has low stiffness.     The patient has no limp.    Patient is walking with nothing for assistive device.    The patient goes up and down stairs normally.    Patient can walk unlimited blocks.    The patient puts shoes and socks on with ease, though occasionally feels a pull deep in her hip.     The patient is doing outpatient physical therapy.    The patient does not have trochanteric pain.    No fevers or drainage from the incision.    There are no concerns.    Physical Exam:    The patient is well appearing, alert and oriented to person, place and time.    The incision is well healed.  There is no sign of wound complication.    There is no tenderness over the greater trochanter.    Range of motion is: full extension to 100 degrees of flexion.    The hip internally rotates to 15 degrees and externally rotates to 45 degrees.    Abduction is 40 degrees and adduction is 15 degrees.    There is no instability of the joint.    Homans sign is negative.    Neurologic, and vascular examinations are normal.    PROMs  Hoos Jr-Hip Disability And Osteoarthritis Outcome Score For Joint Replacement    2024 12:34 PM EST - Filed by Fatimah Leigh RN   Instructions    What amount of hip  pain have you experienced the last week during the following activities?   Going up or down stairs Severe   Walking on an uneven surface Moderate   The following questions concern your physical function. By this we mean your ability to move around and to look after yourself. For each of the following activities please indicate the degree of difficulty you have experienced in the last week due to your hip.   Rising from sitting Severe   Bending to floor/ an object Moderate   Lying in bed (turning over, maintaining hip position) Mild   Sitting Moderate   Hoos Jr Scoring (range: 0 - 100) 49.86     Ort [facility] 12 Item Health Survey (Vr-12)    2/21/2024 12:35 PM EST - Filed by Fatimah Leigh RN   In general, would you say your health is: Good   The following questions are about activities you might do during a typical day. Does your health now limit you in these activities?  If so, how much?   Moderate activities, such as moving a table, pushing a vacuum , bowling, or playing golf? Yes, Limited A Little   Climbing several flights of stairs? Yes, Limited A Lot   During the past 4 weeks, have you had any of the following problems with your work or other regular daily activities as a result of your physical health?   Accomplished less than you would like. Yes, Most Of The Time   Were limited in the kind of work or other activities. Yes, Most Of The Time   During the past 4 weeks, have you had any of the following problems with your work or other regular daily activities as a result of any emotional problems (such as feeling depressed or anxious)?   Accomplished less than you would like Yes, Some Of The Time   Didn't do work or other activities as carefully as usual Yes, Some Of The Time   During the past 4 weeks, how much did pain interfere with your normal work (including both work outside the home and house work)? Quite A Bit   How much of the time during the past 4 weeks:   Have you felt calm and  peaceful? Some Of The Time   Did you have a lot of energy? Some Of The Time   Have you felt downhearted and blue? Some Of The Time   During the past 4 weeks, how much of the time has your physical health or emotional problems interfered with your social activities (like visiting with friends, relatives, etc.)? Some Of The Time   Compared to one year ago, how would you rate your physical health in general now? About The Same   Compared to one year ago, how would you rate your emotional problems (such as feeling anxious, depressed or irritable) now? About The Same   Ort Vr-12 Question Pcs (range: 0 - 100) 28.03   Ort Vr-12 Question McS (range: 0 - 100) 40.9          Imaging:    X-rays were personally reviewed today and show implants in good position with no evidence of complication.    Impression and Plan:    64 y.o. female 3 months 28 days from right posterior total hip replacement.    The patient is doing well following total hip replacement surgery.  Antibiotic prophylaxis prior to dental procedures were reviewed.  Hip precautions were reviewed.  Long-term failure mechanisms were reviewed.  The patient was asked to contact the office sooner if there are any concerns. New physical therapy prescription was given and exercises reviewed with the patient in the office. Their questions were answered.     RTC: 1 year     X-rays at next visit: Postop ULYSSES series     _____________________  Blanca Morel MD   Attending Orthopaedic Surgeon  [facility]    [facility]    This office note was transcribed with dictation software.  Please excuse any typographical errors, program misunderstandings leading to inadvertent insertions or deletions of inappropriate wording, pronoun errors and other unintentional transcription errors not noticed on proof-reading.

## 2024-06-24 ENCOUNTER — HOSPITAL ENCOUNTER (OUTPATIENT)
Dept: RADIOLOGY | Facility: HOSPITAL | Age: 64
Discharge: HOME | End: 2024-06-24
Payer: COMMERCIAL

## 2024-06-24 ENCOUNTER — OFFICE VISIT (OUTPATIENT)
Dept: ORTHOPEDIC SURGERY | Facility: HOSPITAL | Age: 64
End: 2024-06-24
Payer: COMMERCIAL

## 2024-06-24 ENCOUNTER — APPOINTMENT (OUTPATIENT)
Dept: ORTHOPEDIC SURGERY | Facility: HOSPITAL | Age: 64
End: 2024-06-24
Payer: COMMERCIAL

## 2024-06-24 DIAGNOSIS — Z96.641 S/P TOTAL RIGHT HIP ARTHROPLASTY: Primary | ICD-10-CM

## 2024-06-24 DIAGNOSIS — M16.11 UNILATERAL PRIMARY OSTEOARTHRITIS, RIGHT HIP: ICD-10-CM

## 2024-06-24 PROCEDURE — 3044F HG A1C LEVEL LT 7.0%: CPT | Performed by: STUDENT IN AN ORGANIZED HEALTH CARE EDUCATION/TRAINING PROGRAM

## 2024-06-24 PROCEDURE — 99213 OFFICE O/P EST LOW 20 MIN: CPT | Performed by: STUDENT IN AN ORGANIZED HEALTH CARE EDUCATION/TRAINING PROGRAM

## 2024-06-24 PROCEDURE — 73502 X-RAY EXAM HIP UNI 2-3 VIEWS: CPT | Mod: RT

## 2024-06-24 PROCEDURE — 3048F LDL-C <100 MG/DL: CPT | Performed by: STUDENT IN AN ORGANIZED HEALTH CARE EDUCATION/TRAINING PROGRAM

## 2024-06-24 PROCEDURE — 73502 X-RAY EXAM HIP UNI 2-3 VIEWS: CPT | Mod: RIGHT SIDE | Performed by: RADIOLOGY

## 2024-06-24 ASSESSMENT — PAIN - FUNCTIONAL ASSESSMENT: PAIN_FUNCTIONAL_ASSESSMENT: NO/DENIES PAIN

## 2024-07-05 ENCOUNTER — OFFICE VISIT (OUTPATIENT)
Dept: PRIMARY CARE | Facility: CLINIC | Age: 64
End: 2024-07-05
Payer: COMMERCIAL

## 2024-07-05 VITALS
HEIGHT: 66 IN | SYSTOLIC BLOOD PRESSURE: 130 MMHG | BODY MASS INDEX: 40.02 KG/M2 | DIASTOLIC BLOOD PRESSURE: 82 MMHG | WEIGHT: 249 LBS

## 2024-07-05 DIAGNOSIS — R21 RASH: ICD-10-CM

## 2024-07-05 DIAGNOSIS — L03.90 CELLULITIS, UNSPECIFIED CELLULITIS SITE: Primary | ICD-10-CM

## 2024-07-05 PROBLEM — E05.90 HYPERTHYROIDISM: Status: ACTIVE | Noted: 2024-07-05

## 2024-07-05 PROBLEM — R07.9 CHEST PAIN: Status: ACTIVE | Noted: 2024-07-05

## 2024-07-05 PROCEDURE — 3048F LDL-C <100 MG/DL: CPT | Performed by: INTERNAL MEDICINE

## 2024-07-05 PROCEDURE — 3044F HG A1C LEVEL LT 7.0%: CPT | Performed by: INTERNAL MEDICINE

## 2024-07-05 PROCEDURE — 99214 OFFICE O/P EST MOD 30 MIN: CPT | Performed by: INTERNAL MEDICINE

## 2024-07-05 PROCEDURE — 3075F SYST BP GE 130 - 139MM HG: CPT | Performed by: INTERNAL MEDICINE

## 2024-07-05 PROCEDURE — 3079F DIAST BP 80-89 MM HG: CPT | Performed by: INTERNAL MEDICINE

## 2024-07-05 RX ORDER — DOXYCYCLINE 100 MG/1
100 CAPSULE ORAL 2 TIMES DAILY
Qty: 14 CAPSULE | Refills: 0 | Status: SHIPPED | OUTPATIENT
Start: 2024-07-05 | End: 2024-07-12

## 2024-07-05 RX ORDER — AMOXICILLIN AND CLAVULANATE POTASSIUM 875; 125 MG/1; MG/1
875 TABLET, FILM COATED ORAL 2 TIMES DAILY
Qty: 20 TABLET | Refills: 0 | Status: SHIPPED | OUTPATIENT
Start: 2024-07-05 | End: 2024-07-15

## 2024-07-05 ASSESSMENT — ENCOUNTER SYMPTOMS
DEPRESSION: 0
LOSS OF SENSATION IN FEET: 0
OCCASIONAL FEELINGS OF UNSTEADINESS: 0

## 2024-07-06 NOTE — PROGRESS NOTES
"Subjective   Patient ID: Melody Yap is a 64 y.o. female who presents for Cellulitis.    This young lady today came here for pain and swelling in right leg on shin, red, inflamed, tender, cellulitis present.  Mucoid discharge coming out.  She is concerned.  Overall okay.  No problem.    I have personally reviewed the patient's Past Medical History, Medications, Allergies, Social History, and Family History in the EMR.    Review of Systems   All other systems reviewed and are negative.    Objective   /82   Ht 1.676 m (5' 6\")   Wt 113 kg (249 lb)   BMI 40.19 kg/m²     Physical Exam  Vitals reviewed.   Cardiovascular:      Heart sounds: Normal heart sounds, S1 normal and S2 normal. No murmur heard.     No friction rub.   Pulmonary:      Effort: Pulmonary effort is normal.      Breath sounds: Normal breath sounds and air entry.   Abdominal:      Palpations: There is no hepatomegaly, splenomegaly or mass.   Musculoskeletal:      Right lower leg: No edema.      Left lower leg: No edema.   Lymphadenopathy:      Lower Body: No right inguinal adenopathy. No left inguinal adenopathy.   Skin:     Comments: Right leg below knee above ankle, shin area red, inflamed, tender, cellulitis.   Neurological:      Cranial Nerves: Cranial nerves 2-12 are intact.      Sensory: No sensory deficit.      Motor: Motor function is intact.      Deep Tendon Reflexes: Reflexes are normal and symmetric.     LAB WORK: Laboratory testing discussed.    Assessment/Plan   Problem List Items Addressed This Visit    None  Visit Diagnoses         Codes    Cellulitis, unspecified cellulitis site    -  Primary L03.90    Rash     R21    Relevant Medications    doxycycline (Vibramycin) 100 mg capsule    amoxicillin-pot clavulanate (Augmentin) 875-125 mg tablet        1. Cellulitis.  Soak in hot water, bacitracin, Augmentin, doxycycline.  Follow-up in 10 days if she is not better.  2. Continue to follow.    Scribe Attestation  By signing my name " below, I, Rivas Singh attest that this documentation has been prepared under the direction and in the presence of Elier Kruger MD.

## 2024-07-19 DIAGNOSIS — B37.9 YEAST INFECTION: ICD-10-CM

## 2024-07-19 DIAGNOSIS — E03.9 HYPOTHYROIDISM, UNSPECIFIED TYPE: ICD-10-CM

## 2024-07-19 DIAGNOSIS — E78.00 HYPERCHOLESTEREMIA: ICD-10-CM

## 2024-07-19 RX ORDER — FLUCONAZOLE 100 MG/1
100 TABLET ORAL DAILY
Qty: 6 TABLET | Refills: 0 | Status: SHIPPED | OUTPATIENT
Start: 2024-07-19 | End: 2024-07-25

## 2024-07-26 ENCOUNTER — PATIENT MESSAGE (OUTPATIENT)
Dept: ORTHOPEDIC SURGERY | Facility: HOSPITAL | Age: 64
End: 2024-07-26

## 2024-07-26 ENCOUNTER — APPOINTMENT (OUTPATIENT)
Dept: PRIMARY CARE | Facility: CLINIC | Age: 64
End: 2024-07-26
Payer: COMMERCIAL

## 2024-07-26 ENCOUNTER — LAB (OUTPATIENT)
Dept: LAB | Facility: LAB | Age: 64
End: 2024-07-26
Payer: COMMERCIAL

## 2024-07-26 DIAGNOSIS — I10 HYPERTENSION, UNSPECIFIED TYPE: ICD-10-CM

## 2024-07-26 DIAGNOSIS — E03.9 HYPOTHYROIDISM, UNSPECIFIED TYPE: ICD-10-CM

## 2024-07-26 DIAGNOSIS — E78.00 HYPERCHOLESTEREMIA: ICD-10-CM

## 2024-07-26 LAB
ALBUMIN SERPL BCP-MCNC: 4.5 G/DL (ref 3.4–5)
ALP SERPL-CCNC: 78 U/L (ref 33–136)
ALT SERPL W P-5'-P-CCNC: 23 U/L (ref 7–45)
ANION GAP SERPL CALC-SCNC: 13 MMOL/L (ref 10–20)
AST SERPL W P-5'-P-CCNC: 22 U/L (ref 9–39)
BILIRUB SERPL-MCNC: 0.6 MG/DL (ref 0–1.2)
BUN SERPL-MCNC: 22 MG/DL (ref 6–23)
CALCIUM SERPL-MCNC: 9.5 MG/DL (ref 8.6–10.6)
CHLORIDE SERPL-SCNC: 103 MMOL/L (ref 98–107)
CHOLEST SERPL-MCNC: 169 MG/DL (ref 0–199)
CHOLESTEROL/HDL RATIO: 3.4
CO2 SERPL-SCNC: 29 MMOL/L (ref 21–32)
CREAT SERPL-MCNC: 0.79 MG/DL (ref 0.5–1.05)
EGFRCR SERPLBLD CKD-EPI 2021: 85 ML/MIN/1.73M*2
ERYTHROCYTE [DISTWIDTH] IN BLOOD BY AUTOMATED COUNT: 12.8 % (ref 11.5–14.5)
GLUCOSE SERPL-MCNC: 144 MG/DL (ref 74–99)
HCT VFR BLD AUTO: 42.9 % (ref 36–46)
HDLC SERPL-MCNC: 49.4 MG/DL
HGB BLD-MCNC: 14.3 G/DL (ref 12–16)
LDLC SERPL CALC-MCNC: 82 MG/DL
MCH RBC QN AUTO: 30 PG (ref 26–34)
MCHC RBC AUTO-ENTMCNC: 33.3 G/DL (ref 32–36)
MCV RBC AUTO: 90 FL (ref 80–100)
NON HDL CHOLESTEROL: 120 MG/DL (ref 0–149)
NRBC BLD-RTO: 0 /100 WBCS (ref 0–0)
PLATELET # BLD AUTO: 306 X10*3/UL (ref 150–450)
POTASSIUM SERPL-SCNC: 4.3 MMOL/L (ref 3.5–5.3)
PROT SERPL-MCNC: 7 G/DL (ref 6.4–8.2)
RBC # BLD AUTO: 4.77 X10*6/UL (ref 4–5.2)
SODIUM SERPL-SCNC: 141 MMOL/L (ref 136–145)
TRIGL SERPL-MCNC: 187 MG/DL (ref 0–149)
TSH SERPL-ACNC: 2.47 MIU/L (ref 0.44–3.98)
VLDL: 37 MG/DL (ref 0–40)
WBC # BLD AUTO: 7.1 X10*3/UL (ref 4.4–11.3)

## 2024-07-26 PROCEDURE — 84443 ASSAY THYROID STIM HORMONE: CPT

## 2024-07-26 PROCEDURE — 85027 COMPLETE CBC AUTOMATED: CPT

## 2024-07-26 PROCEDURE — 80061 LIPID PANEL: CPT

## 2024-07-26 PROCEDURE — 36415 COLL VENOUS BLD VENIPUNCTURE: CPT

## 2024-07-26 PROCEDURE — 80053 COMPREHEN METABOLIC PANEL: CPT

## 2024-08-02 ENCOUNTER — APPOINTMENT (OUTPATIENT)
Dept: PRIMARY CARE | Facility: CLINIC | Age: 64
End: 2024-08-02
Payer: COMMERCIAL

## 2024-08-02 VITALS
DIASTOLIC BLOOD PRESSURE: 78 MMHG | WEIGHT: 244 LBS | BODY MASS INDEX: 39.21 KG/M2 | SYSTOLIC BLOOD PRESSURE: 130 MMHG | HEIGHT: 66 IN

## 2024-08-02 DIAGNOSIS — R60.9 EDEMA, UNSPECIFIED TYPE: Primary | ICD-10-CM

## 2024-08-02 DIAGNOSIS — E13.69 OTHER SPECIFIED DIABETES MELLITUS WITH OTHER SPECIFIED COMPLICATION, UNSPECIFIED WHETHER LONG TERM INSULIN USE (MULTI): ICD-10-CM

## 2024-08-02 DIAGNOSIS — E03.9 HYPOTHYROIDISM, UNSPECIFIED TYPE: ICD-10-CM

## 2024-08-02 DIAGNOSIS — E78.00 HYPERCHOLESTEREMIA: ICD-10-CM

## 2024-08-02 DIAGNOSIS — I10 HYPERTENSION, UNSPECIFIED TYPE: ICD-10-CM

## 2024-08-02 DIAGNOSIS — F41.9 ANXIETY: ICD-10-CM

## 2024-08-02 PROCEDURE — 3008F BODY MASS INDEX DOCD: CPT | Performed by: INTERNAL MEDICINE

## 2024-08-02 PROCEDURE — 3075F SYST BP GE 130 - 139MM HG: CPT | Performed by: INTERNAL MEDICINE

## 2024-08-02 PROCEDURE — 3044F HG A1C LEVEL LT 7.0%: CPT | Performed by: INTERNAL MEDICINE

## 2024-08-02 PROCEDURE — 99214 OFFICE O/P EST MOD 30 MIN: CPT | Performed by: INTERNAL MEDICINE

## 2024-08-02 PROCEDURE — 3078F DIAST BP <80 MM HG: CPT | Performed by: INTERNAL MEDICINE

## 2024-08-02 PROCEDURE — 3048F LDL-C <100 MG/DL: CPT | Performed by: INTERNAL MEDICINE

## 2024-08-02 RX ORDER — ALPRAZOLAM 0.5 MG/1
0.5 TABLET ORAL DAILY PRN
Qty: 20 TABLET | Refills: 0 | Status: SHIPPED | OUTPATIENT
Start: 2024-08-02 | End: 2025-03-30

## 2024-08-02 RX ORDER — BUPROPION HYDROCHLORIDE 300 MG/1
300 TABLET ORAL EVERY MORNING
Qty: 30 TABLET | Refills: 5 | Status: SHIPPED | OUTPATIENT
Start: 2024-08-02

## 2024-08-02 RX ORDER — LEVOTHYROXINE SODIUM 125 UG/1
125 TABLET ORAL DAILY
Qty: 90 TABLET | Refills: 0 | Status: SHIPPED | OUTPATIENT
Start: 2024-08-02

## 2024-08-02 RX ORDER — METFORMIN HYDROCHLORIDE 500 MG/1
500 TABLET ORAL
Qty: 60 TABLET | Refills: 0 | Status: SHIPPED | OUTPATIENT
Start: 2024-08-02

## 2024-08-02 RX ORDER — HYDROCHLOROTHIAZIDE 25 MG/1
25 TABLET ORAL DAILY
Qty: 90 TABLET | Refills: 0 | Status: SHIPPED | OUTPATIENT
Start: 2024-08-02

## 2024-08-02 RX ORDER — GLUCOSAMINE/CHONDRO SU A 500-400 MG
1 TABLET ORAL 3 TIMES DAILY
COMMUNITY

## 2024-08-02 ASSESSMENT — ENCOUNTER SYMPTOMS
OCCASIONAL FEELINGS OF UNSTEADINESS: 0
LOSS OF SENSATION IN FEET: 0
DEPRESSION: 0

## 2024-08-03 NOTE — PROGRESS NOTES
"Subjective   Patient ID: Melody Yap is a 64 y.o. female who presents for multiple medical issues.    Alexis Yap today came here for multiple medical issues.  Overall, she is a happy person.  Appetite and weight are okay.  No chest pain.  She is trying to control diabetes with diet alone.  Legs swell up.  She has a concern.    I have personally reviewed the patient's Past Medical History, Medications, Allergies, Social History, and Family History in the EMR.    Review of Systems   All other systems reviewed and are negative.    Objective   /78   Ht 1.676 m (5' 6\")   Wt 111 kg (244 lb)   BMI 39.38 kg/m²     Physical Exam  Vitals reviewed.   Cardiovascular:      Heart sounds: Normal heart sounds, S1 normal and S2 normal. No murmur heard.     No friction rub.   Pulmonary:      Effort: Pulmonary effort is normal.      Breath sounds: Normal breath sounds and air entry.   Abdominal:      Palpations: There is no hepatomegaly, splenomegaly or mass.   Musculoskeletal:      Right lower le+ Edema present.      Left lower le+ Edema present.   Lymphadenopathy:      Lower Body: No right inguinal adenopathy. No left inguinal adenopathy.   Neurological:      Cranial Nerves: Cranial nerves 2-12 are intact.      Sensory: No sensory deficit.      Motor: Motor function is intact.      Deep Tendon Reflexes: Reflexes are normal and symmetric.     LAB WORK:  Laboratory testing discussed.    Assessment/Plan   Problem List Items Addressed This Visit             ICD-10-CM       Cardiac and Vasculature    Hypercholesteremia E78.00    Relevant Orders    Comprehensive Metabolic Panel    HTN (hypertension) I10    Relevant Medications    hydroCHLOROthiazide (HYDRODiuril) 25 mg tablet       Endocrine/Metabolic    Hypothyroidism E03.9    Relevant Medications    levothyroxine (Synthroid, Levoxyl) 125 mcg tablet    Diabetes mellitus (Multi) E11.9    Relevant Medications    metFORMIN (Glucophage) 500 mg tablet    Other " Relevant Orders    Hemoglobin A1C       Mental Health    Anxiety F41.9    Relevant Medications    ALPRAZolam (Xanax) 0.5 mg tablet    buPROPion XL (Wellbutrin XL) 300 mg 24 hr tablet       Symptoms and Signs    Edema - Primary R60.9   1. Edema.  Hydrochlorothiazide and potassium okay.  2. Type 2 diabetes.  Diet alone is not doing it.  I gave metformin 500 mg twice a day.  3. Hypertension, okay.  4. High cholesterol, okay.  5. Thyroid, stable.  6. Hypothyroid, on Synthroid.  7. Follow up in six weeks with repeat test.  8. Continue to follow.  9. The patient was here, she told me [place] is [place].  She takes Xanax.  It is approved by a psychiatrist.  Prescription given.  Paper up to date.    Scribe Attestation  By signing my name below, Rosie LANDA Scribe attest that this documentation has been prepared under the direction and in the presence of Elier Kruger MD.

## 2024-08-15 NOTE — PROGRESS NOTES
"  Physical Therapy Treatment    Patient Name: Alexis Yap  MRN: [id number]  Today's Date: 5/8/2024  Time Calculation  Start Time: 1049  Stop Time: 1131  Time Calculation (min): 42 min   ,      Current Problem  1. Pain of right hip  Follow Up In Physical Therapy      2. 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  She noted increased soreness on Thursday and Friday.  Since then the pain has been much less since then.  Able to roll in bed.  Stair climbing is still challenging.  Had knees injected.    Performing HEP?: No - due to hosting company    Precautions  Precautions  STEADI Fall Risk Score (The score of 4 or more indicates an increased risk of falling): 0  Precautions Comment: Posterior ULYSSES precautions - no fall risk  Pain  Pain Assessment: 0-10  Pain Score: 0 - No pain  Pain Location: Hip  Pain Orientation: Right    Objective       Treatments:    Therapeutic Exercise  Therapeutic Exercise Activity 1: SciFit Man L2 x 5 min  Therapeutic Exercise Activity 2: incline g/s stretch x 1 min  Therapeutic Exercise Activity 3: incline heel raises x 10  Therapeutic Exercise Activity 4: YTB loop R/L hip ABD 2 x 10 ea  Therapeutic Exercise Activity 5: bridge with ADD 5\" 2 x 10  Therapeutic Exercise Activity 6: 3#/3# alt SAQ x 1 min         Manual Therapy  Manual Therapy Activity 1: R hip flexor/adductor/quad release and scar mobs  Manual Therapy Activity 2: gentle leg pull/ inf joint distraction  Manual Therapy " RN called patient/family and relayed provider's message. Patient/family verbalized understanding.     Ruby Mcnally RN, BSN  [facility]: [place]     "Activity 3: stretch R ADD, HS    Therapeutic Activity  Therapeutic Activity 1: 6\" FWD step ups R/L lead x 10 ea  Therapeutic Activity 2: 6\" lateral step overs x 1 min  Therapeutic Activity 3: 4\" step downs WB R LE x 1 min  Therapeutic Activity 4: sit to stand from chair with 2# ball press OH x 10                OP EDUCATION:       Assessment:  PT Assessment  Assessment Comment: Some mild knee soreness from WB ther ex.  Finished session on the plinth.  Improving bed mobility and transfers with less pain.  Scarr tissue is softening.    Plan:  OP PT Plan  Treatment/Interventions: Cryotherapy, Education/ Instruction, Electrical stimulation, Gait training, Hot pack, Manual therapy, Neuromuscular re-education, Self care/ home management, Taping techniques, Therapeutic activities, Therapeutic exercises  PT Plan: Skilled PT  PT Frequency: 2 times per week  Duration: 8-10 weeks, reducing frequency as goals are met  Onset Date: 02/27/24  Number of Treatments Authorized: 4/40  Rehab Potential: Good  Plan of Care Agreement: Patient    Goals:  Active       R ULYSSES       STG/LTG       Start:  04/25/24    Expected End:  07/04/24       STG  1) Patient will improve LEFS score by 9 points in order to perform functional activities at home and in the community in 4 weeks.  2) Patient will be able to complete ADLs with pain less than 3/10 in hip in 4 weeks.  3) Pt will improve hip AROM flexion to 90 degrees to be able to complete ADLs with less difficulty in 4 weeks.   4) Patient will be independent with HEP to allow for continued improvement in daily tasks at home and in the community in 3 visits.   LTG  1) Patient will have >/=4+/5 strength in lateral hip musculature to aid in stability with ambulation on varied surfaces in community in 8 weeks.  2) Patient will be able to perform proper squatting technique in order to prevent increased pain with daily tasks in 8 weeks.  3) Patient will be able to perform >10 seconds of SLS on even " ground in order to allow for safe ambulation and reduced fall risk within the community in 8 weeks.   4) Patient will improve LEFS score by to >/=55/80 in order to perform functional activities at home and in the community by discharge.  5) Pt will return to gym or walking regimen for wellness per goal by discharge.               Rosalie Roca, PTA

## 2024-09-18 ENCOUNTER — LAB (OUTPATIENT)
Dept: LAB | Facility: LAB | Age: 64
End: 2024-09-18
Payer: COMMERCIAL

## 2024-09-18 DIAGNOSIS — E78.00 HYPERCHOLESTEREMIA: ICD-10-CM

## 2024-09-18 DIAGNOSIS — E13.69 OTHER SPECIFIED DIABETES MELLITUS WITH OTHER SPECIFIED COMPLICATION, UNSPECIFIED WHETHER LONG TERM INSULIN USE (MULTI): ICD-10-CM

## 2024-09-18 LAB
ALBUMIN SERPL BCP-MCNC: 4.4 G/DL (ref 3.4–5)
ALP SERPL-CCNC: 80 U/L (ref 33–136)
ALT SERPL W P-5'-P-CCNC: 31 U/L (ref 7–45)
ANION GAP SERPL CALC-SCNC: 9 MMOL/L (ref 10–20)
AST SERPL W P-5'-P-CCNC: 28 U/L (ref 9–39)
BILIRUB SERPL-MCNC: 0.6 MG/DL (ref 0–1.2)
BUN SERPL-MCNC: 21 MG/DL (ref 6–23)
CALCIUM SERPL-MCNC: 9.7 MG/DL (ref 8.6–10.6)
CHLORIDE SERPL-SCNC: 104 MMOL/L (ref 98–107)
CO2 SERPL-SCNC: 35 MMOL/L (ref 21–32)
CREAT SERPL-MCNC: 0.7 MG/DL (ref 0.5–1.05)
EGFRCR SERPLBLD CKD-EPI 2021: >90 ML/MIN/1.73M*2
EST. AVERAGE GLUCOSE BLD GHB EST-MCNC: 126 MG/DL
GLUCOSE SERPL-MCNC: 149 MG/DL (ref 74–99)
HBA1C MFR BLD: 6 %
POTASSIUM SERPL-SCNC: 4.5 MMOL/L (ref 3.5–5.3)
PROT SERPL-MCNC: 7 G/DL (ref 6.4–8.2)
SODIUM SERPL-SCNC: 143 MMOL/L (ref 136–145)

## 2024-09-18 PROCEDURE — 36415 COLL VENOUS BLD VENIPUNCTURE: CPT

## 2024-09-20 ENCOUNTER — APPOINTMENT (OUTPATIENT)
Dept: PRIMARY CARE | Facility: CLINIC | Age: 64
End: 2024-09-20
Payer: COMMERCIAL

## 2024-09-20 ENCOUNTER — PHARMACY VISIT (OUTPATIENT)
Dept: PHARMACY | Facility: CLINIC | Age: 64
End: 2024-09-20
Payer: MEDICARE

## 2024-09-20 VITALS
DIASTOLIC BLOOD PRESSURE: 84 MMHG | SYSTOLIC BLOOD PRESSURE: 142 MMHG | BODY MASS INDEX: 39.7 KG/M2 | HEIGHT: 66 IN | WEIGHT: 247 LBS

## 2024-09-20 DIAGNOSIS — E03.9 HYPOTHYROIDISM, UNSPECIFIED TYPE: ICD-10-CM

## 2024-09-20 DIAGNOSIS — Z79.4 TYPE 2 DIABETES MELLITUS WITHOUT COMPLICATION, WITH LONG-TERM CURRENT USE OF INSULIN (MULTI): Primary | ICD-10-CM

## 2024-09-20 DIAGNOSIS — R42 DIZZINESS: ICD-10-CM

## 2024-09-20 DIAGNOSIS — I10 HYPERTENSION, UNSPECIFIED TYPE: ICD-10-CM

## 2024-09-20 DIAGNOSIS — E13.69 OTHER SPECIFIED DIABETES MELLITUS WITH OTHER SPECIFIED COMPLICATION, UNSPECIFIED WHETHER LONG TERM INSULIN USE (MULTI): ICD-10-CM

## 2024-09-20 DIAGNOSIS — Z96.641 S/P TOTAL RIGHT HIP ARTHROPLASTY: ICD-10-CM

## 2024-09-20 DIAGNOSIS — E11.9 TYPE 2 DIABETES MELLITUS WITHOUT COMPLICATION, WITH LONG-TERM CURRENT USE OF INSULIN (MULTI): Primary | ICD-10-CM

## 2024-09-20 DIAGNOSIS — E78.00 HIGH CHOLESTEROL: ICD-10-CM

## 2024-09-20 PROCEDURE — 3077F SYST BP >= 140 MM HG: CPT | Performed by: INTERNAL MEDICINE

## 2024-09-20 PROCEDURE — RXMED WILLOW AMBULATORY MEDICATION CHARGE

## 2024-09-20 PROCEDURE — 3044F HG A1C LEVEL LT 7.0%: CPT | Performed by: INTERNAL MEDICINE

## 2024-09-20 PROCEDURE — 3008F BODY MASS INDEX DOCD: CPT | Performed by: INTERNAL MEDICINE

## 2024-09-20 PROCEDURE — 3048F LDL-C <100 MG/DL: CPT | Performed by: INTERNAL MEDICINE

## 2024-09-20 PROCEDURE — 99213 OFFICE O/P EST LOW 20 MIN: CPT | Performed by: INTERNAL MEDICINE

## 2024-09-20 PROCEDURE — 3079F DIAST BP 80-89 MM HG: CPT | Performed by: INTERNAL MEDICINE

## 2024-09-20 RX ORDER — TIRZEPATIDE 7.5 MG/.5ML
INJECTION, SOLUTION SUBCUTANEOUS
Qty: 2 ML | Refills: 0 | OUTPATIENT
Start: 2024-09-20

## 2024-09-20 RX ORDER — TIRZEPATIDE 2.5 MG/.5ML
INJECTION, SOLUTION SUBCUTANEOUS
Qty: 2 ML | Refills: 0 | OUTPATIENT
Start: 2024-09-20

## 2024-09-20 RX ORDER — TIRZEPATIDE 10 MG/.5ML
INJECTION, SOLUTION SUBCUTANEOUS
Qty: 2 ML | Refills: 0 | OUTPATIENT
Start: 2024-09-20

## 2024-09-20 RX ORDER — MECLIZINE HYDROCHLORIDE 25 MG/1
25 TABLET ORAL 3 TIMES DAILY PRN
Qty: 30 TABLET | Refills: 0 | Status: SHIPPED | OUTPATIENT
Start: 2024-09-20 | End: 2025-09-20

## 2024-09-20 RX ORDER — TIRZEPATIDE 5 MG/.5ML
INJECTION, SOLUTION SUBCUTANEOUS
Qty: 2 ML | Refills: 0 | OUTPATIENT
Start: 2024-09-20

## 2024-09-20 RX ORDER — IBUPROFEN 800 MG/1
800 TABLET ORAL EVERY 8 HOURS PRN
Qty: 30 TABLET | Refills: 0 | Status: SHIPPED | OUTPATIENT
Start: 2024-09-20

## 2024-09-20 ASSESSMENT — ENCOUNTER SYMPTOMS
DEPRESSION: 0
LOSS OF SENSATION IN FEET: 0
OCCASIONAL FEELINGS OF UNSTEADINESS: 0

## 2024-09-21 NOTE — PROGRESS NOTES
"Subjective   Patient ID: Melody Yap is a 64 y.o. female who presents for Follow-up (Multiple medical issues).    Alexis today came here for multiple medical issues.  1. She is diabetic.  She does not like metformin.  She has to ______, this is the second time she is trying it.  2. Follow-up on other conditions.  She wants to lose weight.  Her sugar is high.  She tried everything.  She cannot lose weight.    I have personally reviewed the patient's Past Medical History, Medications, Allergies, Social History, and Family History in the EMR.    Review of Systems   All other systems reviewed and are negative.    Objective   /84   Ht 1.676 m (5' 6\")   Wt 112 kg (247 lb)   BMI 39.87 kg/m²     Physical Exam  Vitals reviewed.   Cardiovascular:      Heart sounds: Normal heart sounds, S1 normal and S2 normal. No murmur heard.     No friction rub.   Pulmonary:      Effort: Pulmonary effort is normal.      Breath sounds: Normal breath sounds and air entry.   Abdominal:      Palpations: There is no hepatomegaly, splenomegaly or mass.   Musculoskeletal:      Right lower leg: No edema.      Left lower leg: No edema.   Lymphadenopathy:      Lower Body: No right inguinal adenopathy. No left inguinal adenopathy.   Neurological:      Cranial Nerves: Cranial nerves 2-12 are intact.      Sensory: No sensory deficit.      Motor: Motor function is intact.      Deep Tendon Reflexes: Reflexes are normal and symmetric.     LAB WORK:  Laboratory testing discussed.    Assessment/Plan   Problem List Items Addressed This Visit             ICD-10-CM       Cardiac and Vasculature    HTN (hypertension) I10       Endocrine/Metabolic    Hypothyroidism E03.9    Diabetes mellitus (Multi) - Primary E11.9    Relevant Medications    semaglutide 0.25 mg or 0.5 mg (2 mg/3 mL) pen injector       Symptoms and Signs    Dizziness R42    Relevant Medications    meclizine (Antivert) 25 mg tablet     Other Visit Diagnoses         Codes    S/P total " right hip arthroplasty     Z96.641    Relevant Medications    ibuprofen 800 mg tablet    High cholesterol     E78.00        1. Type 2 diabetes, weight gain.  We will try Ozempic to see what insurance covers.  I told her every four weeks we will do up on the dose.  2. Hypertension, okay.  3. High cholesterol, okay.  4. Thyroid, okay.  5. Follow-up appointment with me in four weeks.    Scribe Attestation  By signing my name below, I, Rivas Malone attest that this documentation has been prepared under the direction and in the presence of Elier Kruger MD.

## 2024-10-16 ENCOUNTER — LAB (OUTPATIENT)
Dept: LAB | Facility: LAB | Age: 64
End: 2024-10-16
Payer: COMMERCIAL

## 2024-10-16 DIAGNOSIS — Z79.4 TYPE 2 DIABETES MELLITUS WITHOUT COMPLICATION, WITH LONG-TERM CURRENT USE OF INSULIN (MULTI): ICD-10-CM

## 2024-10-16 DIAGNOSIS — E78.00 HIGH CHOLESTEROL: ICD-10-CM

## 2024-10-16 DIAGNOSIS — E11.9 TYPE 2 DIABETES MELLITUS WITHOUT COMPLICATION, WITH LONG-TERM CURRENT USE OF INSULIN (MULTI): ICD-10-CM

## 2024-10-16 DIAGNOSIS — I10 HYPERTENSION, UNSPECIFIED TYPE: ICD-10-CM

## 2024-10-16 DIAGNOSIS — E03.9 HYPOTHYROIDISM, UNSPECIFIED TYPE: ICD-10-CM

## 2024-10-16 LAB
ALBUMIN SERPL BCP-MCNC: 4.7 G/DL (ref 3.4–5)
ALP SERPL-CCNC: 71 U/L (ref 33–136)
ALT SERPL W P-5'-P-CCNC: 25 U/L (ref 7–45)
ANION GAP SERPL CALC-SCNC: 14 MMOL/L (ref 10–20)
APPEARANCE UR: ABNORMAL
AST SERPL W P-5'-P-CCNC: 26 U/L (ref 9–39)
BILIRUB SERPL-MCNC: 0.6 MG/DL (ref 0–1.2)
BILIRUB UR STRIP.AUTO-MCNC: NEGATIVE MG/DL
BUN SERPL-MCNC: 16 MG/DL (ref 6–23)
CALCIUM SERPL-MCNC: 10 MG/DL (ref 8.6–10.6)
CHLORIDE SERPL-SCNC: 101 MMOL/L (ref 98–107)
CHOLEST SERPL-MCNC: 160 MG/DL (ref 0–199)
CHOLESTEROL/HDL RATIO: 3.5
CO2 SERPL-SCNC: 30 MMOL/L (ref 21–32)
COLOR UR: ABNORMAL
CREAT SERPL-MCNC: 0.75 MG/DL (ref 0.5–1.05)
EGFRCR SERPLBLD CKD-EPI 2021: 90 ML/MIN/1.73M*2
ERYTHROCYTE [DISTWIDTH] IN BLOOD BY AUTOMATED COUNT: 12 % (ref 11.5–14.5)
EST. AVERAGE GLUCOSE BLD GHB EST-MCNC: 128 MG/DL
GLUCOSE SERPL-MCNC: 100 MG/DL (ref 74–99)
GLUCOSE UR STRIP.AUTO-MCNC: NORMAL MG/DL
HBA1C MFR BLD: 6.1 %
HCT VFR BLD AUTO: 44.8 % (ref 36–46)
HDLC SERPL-MCNC: 45.2 MG/DL
HGB BLD-MCNC: 15.1 G/DL (ref 12–16)
KETONES UR STRIP.AUTO-MCNC: NEGATIVE MG/DL
LDLC SERPL CALC-MCNC: 88 MG/DL
LEUKOCYTE ESTERASE UR QL STRIP.AUTO: ABNORMAL
MCH RBC QN AUTO: 30.5 PG (ref 26–34)
MCHC RBC AUTO-ENTMCNC: 33.7 G/DL (ref 32–36)
MCV RBC AUTO: 91 FL (ref 80–100)
MUCOUS THREADS #/AREA URNS AUTO: ABNORMAL /LPF
NITRITE UR QL STRIP.AUTO: NEGATIVE
NON HDL CHOLESTEROL: 115 MG/DL (ref 0–149)
NRBC BLD-RTO: 0 /100 WBCS (ref 0–0)
PH UR STRIP.AUTO: 5.5 [PH]
PLATELET # BLD AUTO: 296 X10*3/UL (ref 150–450)
POTASSIUM SERPL-SCNC: 4.3 MMOL/L (ref 3.5–5.3)
PROT SERPL-MCNC: 7 G/DL (ref 6.4–8.2)
PROT UR STRIP.AUTO-MCNC: NEGATIVE MG/DL
RBC # BLD AUTO: 4.95 X10*6/UL (ref 4–5.2)
RBC # UR STRIP.AUTO: NEGATIVE /UL
RBC #/AREA URNS AUTO: ABNORMAL /HPF
SODIUM SERPL-SCNC: 141 MMOL/L (ref 136–145)
SP GR UR STRIP.AUTO: 1.02
SQUAMOUS #/AREA URNS AUTO: ABNORMAL /HPF
TRIGL SERPL-MCNC: 132 MG/DL (ref 0–149)
TSH SERPL-ACNC: 2.34 MIU/L (ref 0.44–3.98)
UROBILINOGEN UR STRIP.AUTO-MCNC: NORMAL MG/DL
VLDL: 26 MG/DL (ref 0–40)
WBC # BLD AUTO: 6.9 X10*3/UL (ref 4.4–11.3)
WBC #/AREA URNS AUTO: ABNORMAL /HPF

## 2024-10-16 PROCEDURE — 84443 ASSAY THYROID STIM HORMONE: CPT

## 2024-10-16 PROCEDURE — 80053 COMPREHEN METABOLIC PANEL: CPT

## 2024-10-16 PROCEDURE — 80061 LIPID PANEL: CPT

## 2024-10-16 PROCEDURE — 36415 COLL VENOUS BLD VENIPUNCTURE: CPT

## 2024-10-16 PROCEDURE — 81001 URINALYSIS AUTO W/SCOPE: CPT

## 2024-10-16 PROCEDURE — 85027 COMPLETE CBC AUTOMATED: CPT

## 2024-10-16 PROCEDURE — 83036 HEMOGLOBIN GLYCOSYLATED A1C: CPT

## 2024-10-18 ENCOUNTER — PHARMACY VISIT (OUTPATIENT)
Dept: PHARMACY | Facility: CLINIC | Age: 64
End: 2024-10-18
Payer: MEDICARE

## 2024-10-18 ENCOUNTER — APPOINTMENT (OUTPATIENT)
Dept: PRIMARY CARE | Facility: CLINIC | Age: 64
End: 2024-10-18
Payer: COMMERCIAL

## 2024-10-18 VITALS
BODY MASS INDEX: 38.25 KG/M2 | HEIGHT: 66 IN | WEIGHT: 238 LBS | SYSTOLIC BLOOD PRESSURE: 122 MMHG | DIASTOLIC BLOOD PRESSURE: 68 MMHG

## 2024-10-18 DIAGNOSIS — E78.00 HYPERCHOLESTEREMIA: ICD-10-CM

## 2024-10-18 DIAGNOSIS — R63.4 WEIGHT LOSS: Primary | ICD-10-CM

## 2024-10-18 DIAGNOSIS — Z79.4 TYPE 2 DIABETES MELLITUS WITHOUT COMPLICATION, WITH LONG-TERM CURRENT USE OF INSULIN (MULTI): ICD-10-CM

## 2024-10-18 DIAGNOSIS — E03.9 HYPOTHYROIDISM, UNSPECIFIED TYPE: ICD-10-CM

## 2024-10-18 DIAGNOSIS — I10 HYPERTENSION, UNSPECIFIED TYPE: ICD-10-CM

## 2024-10-18 DIAGNOSIS — R82.71 ASYMPTOMATIC BACTERIURIA: ICD-10-CM

## 2024-10-18 DIAGNOSIS — E11.9 TYPE 2 DIABETES MELLITUS WITHOUT COMPLICATION, WITH LONG-TERM CURRENT USE OF INSULIN (MULTI): ICD-10-CM

## 2024-10-18 PROCEDURE — 3074F SYST BP LT 130 MM HG: CPT | Performed by: INTERNAL MEDICINE

## 2024-10-18 PROCEDURE — 3044F HG A1C LEVEL LT 7.0%: CPT | Performed by: INTERNAL MEDICINE

## 2024-10-18 PROCEDURE — 99213 OFFICE O/P EST LOW 20 MIN: CPT | Performed by: INTERNAL MEDICINE

## 2024-10-18 PROCEDURE — 3048F LDL-C <100 MG/DL: CPT | Performed by: INTERNAL MEDICINE

## 2024-10-18 PROCEDURE — 3008F BODY MASS INDEX DOCD: CPT | Performed by: INTERNAL MEDICINE

## 2024-10-18 PROCEDURE — RXMED WILLOW AMBULATORY MEDICATION CHARGE

## 2024-10-18 PROCEDURE — 3078F DIAST BP <80 MM HG: CPT | Performed by: INTERNAL MEDICINE

## 2024-10-18 RX ORDER — TIRZEPATIDE 5 MG/.5ML
INJECTION, SOLUTION SUBCUTANEOUS
Qty: 2 ML | Refills: 0 | Status: SHIPPED | OUTPATIENT
Start: 2024-10-18

## 2024-10-18 NOTE — PROGRESS NOTES
"Subjective   Patient ID: Melody Yap is a 64 y.o. female who presents for Follow-up.    Alexis today came here for multiple medical issues.  Overall, she is a happy person.  Appetite and weight are okay.  No problem.  For first four weeks she lost about 8 pounds.  She is happy.  No appetite.  No side effects from medication.  She came here for follow-up.  She has a blood work taken.    I have personally reviewed the patient's Past Medical History, Medications, Allergies, Social History, and Family History in the EMR.    Review of Systems   All other systems reviewed and are negative.    Objective   /68   Ht 1.676 m (5' 6\")   Wt 108 kg (238 lb)   BMI 38.41 kg/m²     Physical Exam  Vitals reviewed.   Cardiovascular:      Heart sounds: Normal heart sounds, S1 normal and S2 normal. No murmur heard.     No friction rub.   Pulmonary:      Effort: Pulmonary effort is normal.      Breath sounds: Normal breath sounds and air entry.   Abdominal:      Palpations: There is no hepatomegaly, splenomegaly or mass.   Musculoskeletal:      Right lower leg: No edema.      Left lower leg: No edema.   Lymphadenopathy:      Lower Body: No right inguinal adenopathy. No left inguinal adenopathy.   Neurological:      Cranial Nerves: Cranial nerves 2-12 are intact.      Sensory: No sensory deficit.      Motor: Motor function is intact.      Deep Tendon Reflexes: Reflexes are normal and symmetric.     LAB WORK:  Laboratory testing discussed.    Assessment/Plan   Problem List Items Addressed This Visit             ICD-10-CM       Cardiac and Vasculature    Hypercholesteremia E78.00    HTN (hypertension) I10       Endocrine/Metabolic    Weight loss - Primary R63.4    Hypothyroidism E03.9    Diabetes mellitus (Multi) E11.9    Relevant Medications    tirzepatide (Mounjaro) 5 mg/0.5 mL pen injector     Other Visit Diagnoses         Codes    Asymptomatic bacteriuria     R82.71        1. Type 2 diabetes and weight loss.  Mounjaro " helping.  We will bump up dose to 5 mg.  Monitor.  2. Asymptomatic bacteriuria.  No UTI symptoms.  Keep an eye.  3. Hypertension, excellent.  4. Cholesterol, excellent.  5. Thyroid, good.  6. Follow up in a month.  7. I congratulated her on her good living habits, continue.  8. I have advised flu shot, she will think about it.    Scribe Attestation  By signing my name below, I, Rivas Singh attest that this documentation has been prepared under the direction and in the presence of Elier Kruger MD.

## 2024-10-20 NOTE — PROGRESS NOTES
Blanca Morel MD   Adult Reconstruction and Joint Replacement Surgery  Phone: [phone]     Fax: [phone]       Name: Alexis Yap  Age: 64 y.o.   : 1960   Date of Visit: 10/21/2024    HIP REPLACEMENT POSTOPERATIVE VISIT    Procedure: right posterior total hip replacement  Date of Surgery: 2024  Diagnosis: Right hip arthritis    Chief Complaint: Right hip replacement surgery follow-up    History of Present Illness:    The patient is now 7 months 24 days status post right posterior total hip replacement surgery. She is very happy with the outcome of her right hip replacement.  She has been staying busy with her 5 grandkids.    The patient has no pain.    Currently taking nothing for pain.     The patient has low stiffness. She continues to work on external rotation motion.     The patient has no limp.    Patient is walking with nothing for assistive device.    The patient goes up and down stairs normally.    Patient can walk unlimited blocks.    The patient puts shoes and socks on with ease.     The patient is doing HEP for physical therapy.    The patient does not have trochanteric pain.    No fevers or drainage from the incision.    There are no concerns.    Physical Exam:    The patient is well appearing, alert and oriented to person, place and time.    The incision is well healed.  There is no sign of wound complication.    There is no tenderness over the greater trochanter.    Range of motion is: full extension to 100 degrees of flexion.    The hip internally rotates to 20 degrees and externally rotates to 40 degrees.    Abduction is 40 degrees and adduction is 20 degrees.    There is no instability of the joint.    Homans sign is negative.    Neurologic, and vascular examinations are normal.    PROMs  Hoos Jr-Hip Disability And Osteoarthritis Outcome Score For Joint Replacement    2024 12:34 PM EST - Filed by Fatimah Leigh RN   Instructions    What amount of hip pain have you  experienced the last week during the following activities?   Going up or down stairs Severe   Walking on an uneven surface Moderate   The following questions concern your physical function. By this we mean your ability to move around and to look after yourself. For each of the following activities please indicate the degree of difficulty you have experienced in the last week due to your hip.   Rising from sitting Severe   Bending to floor/ an object Moderate   Lying in bed (turning over, maintaining hip position) Mild   Sitting Moderate   Hoos Jr Scoring (range: 0 - 100) 49.86     Ort [facility] 12 Item Health Survey (Vr-12)    2/21/2024 12:35 PM EST - Filed by Fatimah Leigh RN   In general, would you say your health is: Good   The following questions are about activities you might do during a typical day. Does your health now limit you in these activities?  If so, how much?   Moderate activities, such as moving a table, pushing a vacuum , bowling, or playing golf? Yes, Limited A Little   Climbing several flights of stairs? Yes, Limited A Lot   During the past 4 weeks, have you had any of the following problems with your work or other regular daily activities as a result of your physical health?   Accomplished less than you would like. Yes, Most Of The Time   Were limited in the kind of work or other activities. Yes, Most Of The Time   During the past 4 weeks, have you had any of the following problems with your work or other regular daily activities as a result of any emotional problems (such as feeling depressed or anxious)?   Accomplished less than you would like Yes, Some Of The Time   Didn't do work or other activities as carefully as usual Yes, Some Of The Time   During the past 4 weeks, how much did pain interfere with your normal work (including both work outside the home and house work)? Quite A Bit   How much of the time during the past 4 weeks:   Have you felt calm and peaceful? Some Of  The Time   Did you have a lot of energy? Some Of The Time   Have you felt downhearted and blue? Some Of The Time   During the past 4 weeks, how much of the time has your physical health or emotional problems interfered with your social activities (like visiting with friends, relatives, etc.)? Some Of The Time   Compared to one year ago, how would you rate your physical health in general now? About The Same   Compared to one year ago, how would you rate your emotional problems (such as feeling anxious, depressed or irritable) now? About The Same   Ort Vr-12 Question Pcs (range: 0 - 100) 28.03   Ort Vr-12 Question McS (range: 0 - 100) 40.9           Imaging:    X-rays were personally reviewed today and show implants in good position with no evidence of complication.    Impression and Plan:    This patient is 7 months 24 days from right posterior total hip replacement.    The patient is doing well following total hip replacement surgery.  Antibiotic prophylaxis prior to dental procedures were reviewed.  Hip precautions were reviewed.  Long-term failure mechanisms were reviewed.  The patient was asked to contact the office sooner if there are any concerns. New physical therapy prescription was given and exercises reviewed with the patient in the office. Their questions were answered.     RTC: 1 year     X-rays at next visit: Postop ULYSSES series -BILATERAL hips    _____________________  Blanca Morel MD   Attending Orthopaedic Surgeon  [facility]    [facility]    This office note was transcribed with dictation software.  Please excuse any typographical errors, program misunderstandings leading to inadvertent insertions or deletions of inappropriate wording, pronoun errors and other unintentional transcription errors not noticed on proof-reading.

## 2024-10-21 ENCOUNTER — HOSPITAL ENCOUNTER (OUTPATIENT)
Dept: RADIOLOGY | Facility: HOSPITAL | Age: 64
Discharge: HOME | End: 2024-10-21
Payer: COMMERCIAL

## 2024-10-21 ENCOUNTER — APPOINTMENT (OUTPATIENT)
Dept: PRIMARY CARE | Facility: CLINIC | Age: 64
End: 2024-10-21
Payer: COMMERCIAL

## 2024-10-21 ENCOUNTER — OFFICE VISIT (OUTPATIENT)
Dept: ORTHOPEDIC SURGERY | Facility: HOSPITAL | Age: 64
End: 2024-10-21
Payer: COMMERCIAL

## 2024-10-21 DIAGNOSIS — Z96.641 S/P TOTAL RIGHT HIP ARTHROPLASTY: ICD-10-CM

## 2024-10-21 DIAGNOSIS — Z96.641 S/P TOTAL RIGHT HIP ARTHROPLASTY: Primary | ICD-10-CM

## 2024-10-21 PROCEDURE — 99213 OFFICE O/P EST LOW 20 MIN: CPT | Performed by: STUDENT IN AN ORGANIZED HEALTH CARE EDUCATION/TRAINING PROGRAM

## 2024-10-21 PROCEDURE — 73502 X-RAY EXAM HIP UNI 2-3 VIEWS: CPT | Mod: RT

## 2024-10-21 PROCEDURE — 73502 X-RAY EXAM HIP UNI 2-3 VIEWS: CPT | Mod: RIGHT SIDE

## 2024-10-21 PROCEDURE — 3048F LDL-C <100 MG/DL: CPT | Performed by: STUDENT IN AN ORGANIZED HEALTH CARE EDUCATION/TRAINING PROGRAM

## 2024-10-21 PROCEDURE — 3044F HG A1C LEVEL LT 7.0%: CPT | Performed by: STUDENT IN AN ORGANIZED HEALTH CARE EDUCATION/TRAINING PROGRAM

## 2024-10-21 ASSESSMENT — PAIN - FUNCTIONAL ASSESSMENT: PAIN_FUNCTIONAL_ASSESSMENT: NO/DENIES PAIN

## 2024-10-25 ENCOUNTER — APPOINTMENT (OUTPATIENT)
Dept: ORTHOPEDIC SURGERY | Facility: CLINIC | Age: 64
End: 2024-10-25
Payer: COMMERCIAL

## 2024-11-07 ENCOUNTER — HOSPITAL ENCOUNTER (INPATIENT)
Facility: HOSPITAL | Age: 64
LOS: 1 days | Discharge: HOME | End: 2024-11-08
Attending: SURGERY | Admitting: SURGERY
Payer: COMMERCIAL

## 2024-11-07 ENCOUNTER — APPOINTMENT (OUTPATIENT)
Dept: RADIOLOGY | Facility: HOSPITAL | Age: 64
End: 2024-11-07
Payer: COMMERCIAL

## 2024-11-07 ENCOUNTER — ANESTHESIA EVENT (OUTPATIENT)
Dept: OPERATING ROOM | Facility: HOSPITAL | Age: 64
End: 2024-11-07
Payer: COMMERCIAL

## 2024-11-07 ENCOUNTER — ANESTHESIA (OUTPATIENT)
Dept: OPERATING ROOM | Facility: HOSPITAL | Age: 64
End: 2024-11-07
Payer: COMMERCIAL

## 2024-11-07 DIAGNOSIS — K35.30 ACUTE APPENDICITIS WITH LOCALIZED PERITONITIS, WITHOUT PERFORATION, ABSCESS, OR GANGRENE: Primary | ICD-10-CM

## 2024-11-07 DIAGNOSIS — K35.201 ACUTE APPENDICITIS WITH PERFORATION AND GENERALIZED PERITONITIS, WITHOUT ABSCESS OR GANGRENE: ICD-10-CM

## 2024-11-07 PROBLEM — K35.80 ACUTE APPENDICITIS: Status: ACTIVE | Noted: 2024-11-07

## 2024-11-07 LAB
ALBUMIN SERPL BCP-MCNC: 4.6 G/DL (ref 3.4–5)
ALP SERPL-CCNC: 65 U/L (ref 33–136)
ALT SERPL W P-5'-P-CCNC: 22 U/L (ref 7–45)
ANION GAP SERPL CALCULATED.3IONS-SCNC: 13 MMOL/L (ref 10–20)
APPEARANCE UR: CLEAR
AST SERPL W P-5'-P-CCNC: 21 U/L (ref 9–39)
BACTERIA #/AREA URNS AUTO: ABNORMAL /HPF
BASOPHILS # BLD AUTO: 0.03 X10*3/UL (ref 0–0.1)
BASOPHILS NFR BLD AUTO: 0.2 %
BILIRUB SERPL-MCNC: 0.7 MG/DL (ref 0–1.2)
BILIRUB UR STRIP.AUTO-MCNC: NEGATIVE MG/DL
BUN SERPL-MCNC: 16 MG/DL (ref 6–23)
CALCIUM SERPL-MCNC: 10.1 MG/DL (ref 8.6–10.3)
CARDIAC TROPONIN I PNL SERPL HS: 5 NG/L (ref 0–13)
CHLORIDE SERPL-SCNC: 101 MMOL/L (ref 98–107)
CO2 SERPL-SCNC: 27 MMOL/L (ref 21–32)
COLOR UR: ABNORMAL
CREAT SERPL-MCNC: 0.68 MG/DL (ref 0.5–1.05)
EGFRCR SERPLBLD CKD-EPI 2021: >90 ML/MIN/1.73M*2
EOSINOPHIL # BLD AUTO: 0.01 X10*3/UL (ref 0–0.7)
EOSINOPHIL NFR BLD AUTO: 0.1 %
ERYTHROCYTE [DISTWIDTH] IN BLOOD BY AUTOMATED COUNT: 12.3 % (ref 11.5–14.5)
GLUCOSE SERPL-MCNC: 105 MG/DL (ref 74–99)
GLUCOSE UR STRIP.AUTO-MCNC: NORMAL MG/DL
HCT VFR BLD AUTO: 43.2 % (ref 36–46)
HGB BLD-MCNC: 15.5 G/DL (ref 12–16)
HOLD SPECIMEN: NORMAL
IMM GRANULOCYTES # BLD AUTO: 0.04 X10*3/UL (ref 0–0.7)
IMM GRANULOCYTES NFR BLD AUTO: 0.3 % (ref 0–0.9)
KETONES UR STRIP.AUTO-MCNC: NEGATIVE MG/DL
LEUKOCYTE ESTERASE UR QL STRIP.AUTO: ABNORMAL
LIPASE SERPL-CCNC: 30 U/L (ref 9–82)
LYMPHOCYTES # BLD AUTO: 2.04 X10*3/UL (ref 1.2–4.8)
LYMPHOCYTES NFR BLD AUTO: 15.6 %
MCH RBC QN AUTO: 31.1 PG (ref 26–34)
MCHC RBC AUTO-ENTMCNC: 35.9 G/DL (ref 32–36)
MCV RBC AUTO: 87 FL (ref 80–100)
MONOCYTES # BLD AUTO: 0.92 X10*3/UL (ref 0.1–1)
MONOCYTES NFR BLD AUTO: 7 %
MUCOUS THREADS #/AREA URNS AUTO: ABNORMAL /LPF
NEUTROPHILS # BLD AUTO: 10.07 X10*3/UL (ref 1.2–7.7)
NEUTROPHILS NFR BLD AUTO: 76.8 %
NITRITE UR QL STRIP.AUTO: NEGATIVE
NRBC BLD-RTO: 0 /100 WBCS (ref 0–0)
PH UR STRIP.AUTO: 6 [PH]
PLATELET # BLD AUTO: 284 X10*3/UL (ref 150–450)
POTASSIUM SERPL-SCNC: 3.5 MMOL/L (ref 3.5–5.3)
PROT SERPL-MCNC: 7.6 G/DL (ref 6.4–8.2)
PROT UR STRIP.AUTO-MCNC: NEGATIVE MG/DL
RBC # BLD AUTO: 4.98 X10*6/UL (ref 4–5.2)
RBC # UR STRIP.AUTO: NEGATIVE /UL
RBC #/AREA URNS AUTO: ABNORMAL /HPF
SODIUM SERPL-SCNC: 137 MMOL/L (ref 136–145)
SP GR UR STRIP.AUTO: 1.01
SQUAMOUS #/AREA URNS AUTO: ABNORMAL /HPF
UROBILINOGEN UR STRIP.AUTO-MCNC: NORMAL MG/DL
WBC # BLD AUTO: 13.1 X10*3/UL (ref 4.4–11.3)
WBC #/AREA URNS AUTO: ABNORMAL /HPF

## 2024-11-07 PROCEDURE — 2500000005 HC RX 250 GENERAL PHARMACY W/O HCPCS: Performed by: ANESTHESIOLOGY

## 2024-11-07 PROCEDURE — 84075 ASSAY ALKALINE PHOSPHATASE: CPT

## 2024-11-07 PROCEDURE — 81001 URINALYSIS AUTO W/SCOPE: CPT

## 2024-11-07 PROCEDURE — 99285 EMERGENCY DEPT VISIT HI MDM: CPT | Mod: 25

## 2024-11-07 PROCEDURE — 2500000004 HC RX 250 GENERAL PHARMACY W/ HCPCS (ALT 636 FOR OP/ED)

## 2024-11-07 PROCEDURE — 2500000004 HC RX 250 GENERAL PHARMACY W/ HCPCS (ALT 636 FOR OP/ED): Performed by: SURGERY

## 2024-11-07 PROCEDURE — 9420000001 HC RT PATIENT EDUCATION 5 MIN

## 2024-11-07 PROCEDURE — 7100000009 HC PHASE TWO TIME - INITIAL BASE CHARGE: Performed by: SURGERY

## 2024-11-07 PROCEDURE — 2500000004 HC RX 250 GENERAL PHARMACY W/ HCPCS (ALT 636 FOR OP/ED): Mod: JZ

## 2024-11-07 PROCEDURE — 88304 TISSUE EXAM BY PATHOLOGIST: CPT | Mod: TC | Performed by: SURGERY

## 2024-11-07 PROCEDURE — 83690 ASSAY OF LIPASE: CPT

## 2024-11-07 PROCEDURE — 7100000011 HC EXTENDED STAY RECOVERY HOURLY - NURSING UNIT

## 2024-11-07 PROCEDURE — 36415 COLL VENOUS BLD VENIPUNCTURE: CPT

## 2024-11-07 PROCEDURE — 0DTJ4ZZ RESECTION OF APPENDIX, PERCUTANEOUS ENDOSCOPIC APPROACH: ICD-10-PCS | Performed by: SURGERY

## 2024-11-07 PROCEDURE — 96375 TX/PRO/DX INJ NEW DRUG ADDON: CPT

## 2024-11-07 PROCEDURE — 2550000001 HC RX 255 CONTRASTS

## 2024-11-07 PROCEDURE — 44970 LAPAROSCOPY APPENDECTOMY: CPT | Performed by: SURGERY

## 2024-11-07 PROCEDURE — 7100000010 HC PHASE TWO TIME - EACH INCREMENTAL 1 MINUTE: Performed by: SURGERY

## 2024-11-07 PROCEDURE — 96361 HYDRATE IV INFUSION ADD-ON: CPT

## 2024-11-07 PROCEDURE — 7100000002 HC RECOVERY ROOM TIME - EACH INCREMENTAL 1 MINUTE: Performed by: SURGERY

## 2024-11-07 PROCEDURE — 7100000001 HC RECOVERY ROOM TIME - INITIAL BASE CHARGE: Performed by: SURGERY

## 2024-11-07 PROCEDURE — 2780000003 HC OR 278 NO HCPCS: Performed by: SURGERY

## 2024-11-07 PROCEDURE — 85025 COMPLETE CBC W/AUTO DIFF WBC: CPT

## 2024-11-07 PROCEDURE — 3700000001 HC GENERAL ANESTHESIA TIME - INITIAL BASE CHARGE: Performed by: SURGERY

## 2024-11-07 PROCEDURE — 74177 CT ABD & PELVIS W/CONTRAST: CPT | Performed by: RADIOLOGY

## 2024-11-07 PROCEDURE — 3700000002 HC GENERAL ANESTHESIA TIME - EACH INCREMENTAL 1 MINUTE: Performed by: SURGERY

## 2024-11-07 PROCEDURE — 96374 THER/PROPH/DIAG INJ IV PUSH: CPT

## 2024-11-07 PROCEDURE — A4550 SURGICAL TRAYS: HCPCS | Performed by: SURGERY

## 2024-11-07 PROCEDURE — 74177 CT ABD & PELVIS W/CONTRAST: CPT

## 2024-11-07 PROCEDURE — 3600000009 HC OR TIME - EACH INCREMENTAL 1 MINUTE - PROCEDURE LEVEL FOUR: Performed by: SURGERY

## 2024-11-07 PROCEDURE — 2720000007 HC OR 272 NO HCPCS: Performed by: SURGERY

## 2024-11-07 PROCEDURE — 87086 URINE CULTURE/COLONY COUNT: CPT | Mod: WESLAB

## 2024-11-07 PROCEDURE — 84484 ASSAY OF TROPONIN QUANT: CPT

## 2024-11-07 PROCEDURE — 3600000004 HC OR TIME - INITIAL BASE CHARGE - PROCEDURE LEVEL FOUR: Performed by: SURGERY

## 2024-11-07 PROCEDURE — 2500000004 HC RX 250 GENERAL PHARMACY W/ HCPCS (ALT 636 FOR OP/ED): Performed by: ANESTHESIOLOGY

## 2024-11-07 PROCEDURE — 82565 ASSAY OF CREATININE: CPT

## 2024-11-07 PROCEDURE — 2500000001 HC RX 250 WO HCPCS SELF ADMINISTERED DRUGS (ALT 637 FOR MEDICARE OP): Performed by: SURGERY

## 2024-11-07 RX ORDER — METOPROLOL SUCCINATE 50 MG/1
50 TABLET, EXTENDED RELEASE ORAL DAILY
Status: DISCONTINUED | OUTPATIENT
Start: 2024-11-07 | End: 2024-11-07

## 2024-11-07 RX ORDER — FENTANYL CITRATE 50 UG/ML
50 INJECTION, SOLUTION INTRAMUSCULAR; INTRAVENOUS EVERY 5 MIN PRN
Status: DISCONTINUED | OUTPATIENT
Start: 2024-11-07 | End: 2024-11-07 | Stop reason: HOSPADM

## 2024-11-07 RX ORDER — DOCUSATE SODIUM 100 MG/1
100 CAPSULE, LIQUID FILLED ORAL 2 TIMES DAILY
Status: DISCONTINUED | OUTPATIENT
Start: 2024-11-07 | End: 2024-11-08 | Stop reason: HOSPADM

## 2024-11-07 RX ORDER — METOPROLOL TARTRATE 50 MG/1
50 TABLET ORAL 2 TIMES DAILY
Status: DISCONTINUED | OUTPATIENT
Start: 2024-11-07 | End: 2024-11-08 | Stop reason: HOSPADM

## 2024-11-07 RX ORDER — ONDANSETRON HYDROCHLORIDE 2 MG/ML
4 INJECTION, SOLUTION INTRAVENOUS ONCE AS NEEDED
Status: DISCONTINUED | OUTPATIENT
Start: 2024-11-07 | End: 2024-11-07 | Stop reason: HOSPADM

## 2024-11-07 RX ORDER — KETOROLAC TROMETHAMINE 30 MG/ML
INJECTION, SOLUTION INTRAMUSCULAR; INTRAVENOUS AS NEEDED
Status: DISCONTINUED | OUTPATIENT
Start: 2024-11-07 | End: 2024-11-07

## 2024-11-07 RX ORDER — ROCURONIUM BROMIDE 10 MG/ML
INJECTION, SOLUTION INTRAVENOUS AS NEEDED
Status: DISCONTINUED | OUTPATIENT
Start: 2024-11-07 | End: 2024-11-07

## 2024-11-07 RX ORDER — FENTANYL CITRATE 50 UG/ML
25 INJECTION, SOLUTION INTRAMUSCULAR; INTRAVENOUS EVERY 5 MIN PRN
Status: DISCONTINUED | OUTPATIENT
Start: 2024-11-07 | End: 2024-11-07 | Stop reason: HOSPADM

## 2024-11-07 RX ORDER — LIDOCAINE HYDROCHLORIDE 10 MG/ML
0.1 INJECTION, SOLUTION INFILTRATION; PERINEURAL ONCE
Status: DISCONTINUED | OUTPATIENT
Start: 2024-11-07 | End: 2024-11-07 | Stop reason: HOSPADM

## 2024-11-07 RX ORDER — SUCCINYLCHOLINE CHLORIDE 100 MG/5ML
SYRINGE (ML) INTRAVENOUS AS NEEDED
Status: DISCONTINUED | OUTPATIENT
Start: 2024-11-07 | End: 2024-11-07

## 2024-11-07 RX ORDER — LIDOCAINE HYDROCHLORIDE 10 MG/ML
INJECTION, SOLUTION INFILTRATION; PERINEURAL AS NEEDED
Status: DISCONTINUED | OUTPATIENT
Start: 2024-11-07 | End: 2024-11-07 | Stop reason: HOSPADM

## 2024-11-07 RX ORDER — GLYCOPYRROLATE 0.2 MG/ML
INJECTION INTRAMUSCULAR; INTRAVENOUS AS NEEDED
Status: DISCONTINUED | OUTPATIENT
Start: 2024-11-07 | End: 2024-11-07

## 2024-11-07 RX ORDER — NEOSTIGMINE METHYLSULFATE 1 MG/ML
INJECTION INTRAVENOUS AS NEEDED
Status: DISCONTINUED | OUTPATIENT
Start: 2024-11-07 | End: 2024-11-07

## 2024-11-07 RX ORDER — LABETALOL HYDROCHLORIDE 5 MG/ML
5 INJECTION, SOLUTION INTRAVENOUS ONCE AS NEEDED
Status: DISCONTINUED | OUTPATIENT
Start: 2024-11-07 | End: 2024-11-07 | Stop reason: HOSPADM

## 2024-11-07 RX ORDER — MIDAZOLAM HYDROCHLORIDE 1 MG/ML
INJECTION, SOLUTION INTRAMUSCULAR; INTRAVENOUS AS NEEDED
Status: DISCONTINUED | OUTPATIENT
Start: 2024-11-07 | End: 2024-11-07

## 2024-11-07 RX ORDER — MEPERIDINE HYDROCHLORIDE 25 MG/ML
12.5 INJECTION INTRAMUSCULAR; INTRAVENOUS; SUBCUTANEOUS EVERY 10 MIN PRN
Status: DISCONTINUED | OUTPATIENT
Start: 2024-11-07 | End: 2024-11-07 | Stop reason: HOSPADM

## 2024-11-07 RX ORDER — METRONIDAZOLE 500 MG/100ML
500 INJECTION, SOLUTION INTRAVENOUS ONCE
Status: COMPLETED | OUTPATIENT
Start: 2024-11-07 | End: 2024-11-07

## 2024-11-07 RX ORDER — NALOXONE HYDROCHLORIDE 0.4 MG/ML
0.2 INJECTION, SOLUTION INTRAMUSCULAR; INTRAVENOUS; SUBCUTANEOUS EVERY 5 MIN PRN
Status: DISCONTINUED | OUTPATIENT
Start: 2024-11-07 | End: 2024-11-08 | Stop reason: HOSPADM

## 2024-11-07 RX ORDER — ONDANSETRON HYDROCHLORIDE 2 MG/ML
4 INJECTION, SOLUTION INTRAVENOUS EVERY 8 HOURS PRN
Status: DISCONTINUED | OUTPATIENT
Start: 2024-11-07 | End: 2024-11-08 | Stop reason: HOSPADM

## 2024-11-07 RX ORDER — BUPIVACAINE HYDROCHLORIDE 5 MG/ML
INJECTION, SOLUTION PERINEURAL AS NEEDED
Status: DISCONTINUED | OUTPATIENT
Start: 2024-11-07 | End: 2024-11-07 | Stop reason: HOSPADM

## 2024-11-07 RX ORDER — FENTANYL CITRATE 50 UG/ML
INJECTION, SOLUTION INTRAMUSCULAR; INTRAVENOUS AS NEEDED
Status: DISCONTINUED | OUTPATIENT
Start: 2024-11-07 | End: 2024-11-07

## 2024-11-07 RX ORDER — KETOROLAC TROMETHAMINE 30 MG/ML
15 INJECTION, SOLUTION INTRAMUSCULAR; INTRAVENOUS ONCE
Status: COMPLETED | OUTPATIENT
Start: 2024-11-07 | End: 2024-11-07

## 2024-11-07 RX ORDER — OXYCODONE HYDROCHLORIDE 5 MG/1
5 TABLET ORAL EVERY 6 HOURS PRN
Status: DISCONTINUED | OUTPATIENT
Start: 2024-11-07 | End: 2024-11-08 | Stop reason: HOSPADM

## 2024-11-07 RX ORDER — SODIUM CHLORIDE AND POTASSIUM CHLORIDE 150; 900 MG/100ML; MG/100ML
75 INJECTION, SOLUTION INTRAVENOUS CONTINUOUS
Status: DISCONTINUED | OUTPATIENT
Start: 2024-11-07 | End: 2024-11-08 | Stop reason: HOSPADM

## 2024-11-07 RX ORDER — OXYCODONE HYDROCHLORIDE 5 MG/1
5 TABLET ORAL EVERY 4 HOURS PRN
Status: DISCONTINUED | OUTPATIENT
Start: 2024-11-07 | End: 2024-11-07 | Stop reason: HOSPADM

## 2024-11-07 RX ORDER — ONDANSETRON HYDROCHLORIDE 2 MG/ML
INJECTION, SOLUTION INTRAVENOUS AS NEEDED
Status: DISCONTINUED | OUTPATIENT
Start: 2024-11-07 | End: 2024-11-07

## 2024-11-07 RX ORDER — ONDANSETRON 4 MG/1
4 TABLET, ORALLY DISINTEGRATING ORAL EVERY 8 HOURS PRN
Status: DISCONTINUED | OUTPATIENT
Start: 2024-11-07 | End: 2024-11-08 | Stop reason: HOSPADM

## 2024-11-07 RX ORDER — ONDANSETRON HYDROCHLORIDE 2 MG/ML
4 INJECTION, SOLUTION INTRAVENOUS ONCE
Status: COMPLETED | OUTPATIENT
Start: 2024-11-07 | End: 2024-11-07

## 2024-11-07 RX ORDER — MORPHINE SULFATE 2 MG/ML
2 INJECTION, SOLUTION INTRAMUSCULAR; INTRAVENOUS EVERY 4 HOURS PRN
Status: DISCONTINUED | OUTPATIENT
Start: 2024-11-07 | End: 2024-11-08 | Stop reason: HOSPADM

## 2024-11-07 RX ORDER — CEFTRIAXONE 2 G/50ML
2 INJECTION, SOLUTION INTRAVENOUS ONCE
Status: COMPLETED | OUTPATIENT
Start: 2024-11-07 | End: 2024-11-07

## 2024-11-07 RX ORDER — ACETAMINOPHEN 325 MG/1
650 TABLET ORAL EVERY 4 HOURS PRN
Status: DISCONTINUED | OUTPATIENT
Start: 2024-11-07 | End: 2024-11-08 | Stop reason: HOSPADM

## 2024-11-07 RX ORDER — PROPOFOL 10 MG/ML
INJECTION, EMULSION INTRAVENOUS AS NEEDED
Status: DISCONTINUED | OUTPATIENT
Start: 2024-11-07 | End: 2024-11-07

## 2024-11-07 RX ORDER — PROCHLORPERAZINE MALEATE 10 MG
10 TABLET ORAL EVERY 6 HOURS PRN
Status: DISCONTINUED | OUTPATIENT
Start: 2024-11-07 | End: 2024-11-08 | Stop reason: HOSPADM

## 2024-11-07 RX ORDER — PROCHLORPERAZINE 25 MG/1
25 SUPPOSITORY RECTAL EVERY 12 HOURS PRN
Status: DISCONTINUED | OUTPATIENT
Start: 2024-11-07 | End: 2024-11-08 | Stop reason: HOSPADM

## 2024-11-07 RX ORDER — PROCHLORPERAZINE EDISYLATE 5 MG/ML
10 INJECTION INTRAMUSCULAR; INTRAVENOUS EVERY 6 HOURS PRN
Status: DISCONTINUED | OUTPATIENT
Start: 2024-11-07 | End: 2024-11-08 | Stop reason: HOSPADM

## 2024-11-07 SDOH — SOCIAL STABILITY: SOCIAL INSECURITY
WITHIN THE LAST YEAR, HAVE YOU BEEN KICKED, HIT, SLAPPED, OR OTHERWISE PHYSICALLY HURT BY YOUR PARTNER OR EX-PARTNER?: NO

## 2024-11-07 SDOH — ECONOMIC STABILITY: FOOD INSECURITY: WITHIN THE PAST 12 MONTHS, YOU WORRIED THAT YOUR FOOD WOULD RUN OUT BEFORE YOU GOT THE MONEY TO BUY MORE.: NEVER TRUE

## 2024-11-07 SDOH — SOCIAL STABILITY: SOCIAL INSECURITY: WITHIN THE LAST YEAR, HAVE YOU BEEN AFRAID OF YOUR PARTNER OR EX-PARTNER?: NO

## 2024-11-07 SDOH — ECONOMIC STABILITY: INCOME INSECURITY: IN THE PAST 12 MONTHS HAS THE ELECTRIC, GAS, OIL, OR WATER COMPANY THREATENED TO SHUT OFF SERVICES IN YOUR HOME?: NO

## 2024-11-07 SDOH — SOCIAL STABILITY: SOCIAL INSECURITY: WERE YOU ABLE TO COMPLETE ALL THE BEHAVIORAL HEALTH SCREENINGS?: YES

## 2024-11-07 SDOH — SOCIAL STABILITY: SOCIAL INSECURITY: ABUSE: ADULT

## 2024-11-07 SDOH — SOCIAL STABILITY: SOCIAL INSECURITY: DO YOU FEEL UNSAFE GOING BACK TO THE PLACE WHERE YOU ARE LIVING?: NO

## 2024-11-07 SDOH — ECONOMIC STABILITY: HOUSING INSECURITY: IN THE LAST 12 MONTHS, WAS THERE A TIME WHEN YOU WERE NOT ABLE TO PAY THE MORTGAGE OR RENT ON TIME?: NO

## 2024-11-07 SDOH — ECONOMIC STABILITY: FOOD INSECURITY: WITHIN THE PAST 12 MONTHS, THE FOOD YOU BOUGHT JUST DIDN'T LAST AND YOU DIDN'T HAVE MONEY TO GET MORE.: NEVER TRUE

## 2024-11-07 SDOH — ECONOMIC STABILITY: HOUSING INSECURITY: AT ANY TIME IN THE PAST 12 MONTHS, WERE YOU HOMELESS OR LIVING IN A SHELTER (INCLUDING NOW)?: NO

## 2024-11-07 SDOH — SOCIAL STABILITY: SOCIAL INSECURITY: WITHIN THE LAST YEAR, HAVE YOU BEEN HUMILIATED OR EMOTIONALLY ABUSED IN OTHER WAYS BY YOUR PARTNER OR EX-PARTNER?: NO

## 2024-11-07 SDOH — ECONOMIC STABILITY: FOOD INSECURITY: HOW HARD IS IT FOR YOU TO PAY FOR THE VERY BASICS LIKE FOOD, HOUSING, MEDICAL CARE, AND HEATING?: NOT HARD AT ALL

## 2024-11-07 SDOH — SOCIAL STABILITY: SOCIAL INSECURITY
WITHIN THE LAST YEAR, HAVE YOU BEEN RAPED OR FORCED TO HAVE ANY KIND OF SEXUAL ACTIVITY BY YOUR PARTNER OR EX-PARTNER?: NO

## 2024-11-07 SDOH — SOCIAL STABILITY: SOCIAL INSECURITY: DOES ANYONE TRY TO KEEP YOU FROM HAVING/CONTACTING OTHER FRIENDS OR DOING THINGS OUTSIDE YOUR HOME?: NO

## 2024-11-07 SDOH — SOCIAL STABILITY: SOCIAL INSECURITY: ARE THERE ANY APPARENT SIGNS OF INJURIES/BEHAVIORS THAT COULD BE RELATED TO ABUSE/NEGLECT?: NO

## 2024-11-07 SDOH — SOCIAL STABILITY: SOCIAL INSECURITY: HAVE YOU HAD THOUGHTS OF HARMING ANYONE ELSE?: NO

## 2024-11-07 SDOH — ECONOMIC STABILITY: TRANSPORTATION INSECURITY: IN THE PAST 12 MONTHS, HAS LACK OF TRANSPORTATION KEPT YOU FROM MEDICAL APPOINTMENTS OR FROM GETTING MEDICATIONS?: NO

## 2024-11-07 SDOH — SOCIAL STABILITY: SOCIAL INSECURITY: DO YOU FEEL ANYONE HAS EXPLOITED OR TAKEN ADVANTAGE OF YOU FINANCIALLY OR OF YOUR PERSONAL PROPERTY?: NO

## 2024-11-07 SDOH — SOCIAL STABILITY: SOCIAL INSECURITY: HAS ANYONE EVER THREATENED TO HURT YOUR FAMILY OR YOUR PETS?: NO

## 2024-11-07 SDOH — ECONOMIC STABILITY: HOUSING INSECURITY: IN THE PAST 12 MONTHS, HOW MANY TIMES HAVE YOU MOVED WHERE YOU WERE LIVING?: 0

## 2024-11-07 SDOH — HEALTH STABILITY: PHYSICAL HEALTH: ON AVERAGE, HOW MANY MINUTES DO YOU ENGAGE IN EXERCISE AT THIS LEVEL?: 30 MIN

## 2024-11-07 SDOH — SOCIAL STABILITY: SOCIAL INSECURITY: ARE YOU OR HAVE YOU BEEN THREATENED OR ABUSED PHYSICALLY, EMOTIONALLY, OR SEXUALLY BY ANYONE?: NO

## 2024-11-07 SDOH — HEALTH STABILITY: PHYSICAL HEALTH: ON AVERAGE, HOW MANY DAYS PER WEEK DO YOU ENGAGE IN MODERATE TO STRENUOUS EXERCISE (LIKE A BRISK WALK)?: 1 DAY

## 2024-11-07 SDOH — SOCIAL STABILITY: SOCIAL INSECURITY: HAVE YOU HAD ANY THOUGHTS OF HARMING ANYONE ELSE?: NO

## 2024-11-07 ASSESSMENT — COGNITIVE AND FUNCTIONAL STATUS - GENERAL
CLIMB 3 TO 5 STEPS WITH RAILING: A LITTLE
PATIENT BASELINE BEDBOUND: NO
STANDING UP FROM CHAIR USING ARMS: A LITTLE
CLIMB 3 TO 5 STEPS WITH RAILING: A LITTLE
MOBILITY SCORE: 21
DAILY ACTIVITIY SCORE: 24
WALKING IN HOSPITAL ROOM: A LITTLE
WALKING IN HOSPITAL ROOM: A LITTLE
MOBILITY SCORE: 21
DAILY ACTIVITIY SCORE: 24
STANDING UP FROM CHAIR USING ARMS: A LITTLE

## 2024-11-07 ASSESSMENT — ACTIVITIES OF DAILY LIVING (ADL)
LACK_OF_TRANSPORTATION: NO
ADEQUATE_TO_COMPLETE_ADL: YES
PATIENT'S MEMORY ADEQUATE TO SAFELY COMPLETE DAILY ACTIVITIES?: YES
ASSISTIVE_DEVICE: EYEGLASSES
JUDGMENT_ADEQUATE_SAFELY_COMPLETE_DAILY_ACTIVITIES: YES
DRESSING YOURSELF: INDEPENDENT
WALKS IN HOME: INDEPENDENT
HEARING - RIGHT EAR: FUNCTIONAL
LACK_OF_TRANSPORTATION: NO
GROOMING: INDEPENDENT
HEARING - LEFT EAR: FUNCTIONAL
BATHING: INDEPENDENT
FEEDING YOURSELF: INDEPENDENT
TOILETING: INDEPENDENT

## 2024-11-07 ASSESSMENT — LIFESTYLE VARIABLES
AUDIT-C TOTAL SCORE: 1
HOW MANY STANDARD DRINKS CONTAINING ALCOHOL DO YOU HAVE ON A TYPICAL DAY: 1 OR 2
SUBSTANCE_ABUSE_PAST_12_MONTHS: NO
PRESCIPTION_ABUSE_PAST_12_MONTHS: NO
SKIP TO QUESTIONS 9-10: 1
AUDIT-C TOTAL SCORE: 1
HOW OFTEN DO YOU HAVE 6 OR MORE DRINKS ON ONE OCCASION: NEVER
HOW OFTEN DO YOU HAVE A DRINK CONTAINING ALCOHOL: MONTHLY OR LESS

## 2024-11-07 ASSESSMENT — PATIENT HEALTH QUESTIONNAIRE - PHQ9
SUM OF ALL RESPONSES TO PHQ9 QUESTIONS 1 & 2: 3
1. LITTLE INTEREST OR PLEASURE IN DOING THINGS: NEARLY EVERY DAY
2. FEELING DOWN, DEPRESSED OR HOPELESS: NOT AT ALL

## 2024-11-07 ASSESSMENT — PAIN - FUNCTIONAL ASSESSMENT
PAIN_FUNCTIONAL_ASSESSMENT: FLACC (FACE, LEGS, ACTIVITY, CRY, CONSOLABILITY)
PAIN_FUNCTIONAL_ASSESSMENT: 0-10
PAIN_FUNCTIONAL_ASSESSMENT: WONG-BAKER FACES
PAIN_FUNCTIONAL_ASSESSMENT: FLACC (FACE, LEGS, ACTIVITY, CRY, CONSOLABILITY)
PAIN_FUNCTIONAL_ASSESSMENT: 0-10

## 2024-11-07 ASSESSMENT — PAIN DESCRIPTION - PAIN TYPE: TYPE: ACUTE PAIN

## 2024-11-07 ASSESSMENT — PAIN DESCRIPTION - DESCRIPTORS
DESCRIPTORS: STABBING
DESCRIPTORS: HEADACHE
DESCRIPTORS: SORE
DESCRIPTORS: ACHING

## 2024-11-07 ASSESSMENT — ENCOUNTER SYMPTOMS
BLOOD IN STOOL: 0
MUSCULOSKELETAL NEGATIVE: 1
ABDOMINAL PAIN: 1
CHILLS: 1
NAUSEA: 1
ENDOCRINE NEGATIVE: 1
RESPIRATORY NEGATIVE: 1
VOMITING: 1
PSYCHIATRIC NEGATIVE: 1
CARDIOVASCULAR NEGATIVE: 1
DIARRHEA: 1
FEVER: 0

## 2024-11-07 ASSESSMENT — COLUMBIA-SUICIDE SEVERITY RATING SCALE - C-SSRS
6. HAVE YOU EVER DONE ANYTHING, STARTED TO DO ANYTHING, OR PREPARED TO DO ANYTHING TO END YOUR LIFE?: NO
1. IN THE PAST MONTH, HAVE YOU WISHED YOU WERE DEAD OR WISHED YOU COULD GO TO SLEEP AND NOT WAKE UP?: NO
2. HAVE YOU ACTUALLY HAD ANY THOUGHTS OF KILLING YOURSELF?: NO

## 2024-11-07 ASSESSMENT — PAIN DESCRIPTION - LOCATION
LOCATION: HEAD
LOCATION: ABDOMEN

## 2024-11-07 ASSESSMENT — PAIN SCALES - GENERAL
PAINLEVEL_OUTOF10: 0 - NO PAIN
PAINLEVEL_OUTOF10: 0 - NO PAIN
PAINLEVEL_OUTOF10: 2
PAINLEVEL_OUTOF10: 2
PAINLEVEL_OUTOF10: 8
PAINLEVEL_OUTOF10: 0 - NO PAIN
PAINLEVEL_OUTOF10: 3
PAINLEVEL_OUTOF10: 0 - NO PAIN
PAINLEVEL_OUTOF10: 3
PAINLEVEL_OUTOF10: 0 - NO PAIN
PAINLEVEL_OUTOF10: 2

## 2024-11-07 ASSESSMENT — PAIN DESCRIPTION - PROGRESSION: CLINICAL_PROGRESSION: NOT CHANGED

## 2024-11-07 ASSESSMENT — PAIN DESCRIPTION - FREQUENCY: FREQUENCY: CONSTANT/CONTINUOUS

## 2024-11-07 ASSESSMENT — PAIN DESCRIPTION - ONSET: ONSET: ONGOING

## 2024-11-07 ASSESSMENT — PAIN DESCRIPTION - ORIENTATION: ORIENTATION: LOWER

## 2024-11-07 ASSESSMENT — PAIN SCALES - WONG BAKER: WONGBAKER_NUMERICALRESPONSE: NO HURT

## 2024-11-07 NOTE — PROGRESS NOTES
Spiritual Care Visit    Clinical Encounter Type  Visited With: Patient not available  Routine Visit: Introduction  Continue Visiting: Yes  Crisis Visit: ED         Values/Beliefs  Spiritual Requests During Hospitalization: Melody was not available for me to see her this afternoon in the ED unit.     Caesar Blunt

## 2024-11-07 NOTE — CARE PLAN
The patient's goals for the shift include comfort,rest, eat    The clinical goals for the shift include stable vitals, pain managment

## 2024-11-07 NOTE — ANESTHESIA POSTPROCEDURE EVALUATION
"Patient: Alexis Yap \"Melody\"    Procedure Summary       Date: 11/07/24 Room / Location: [facility] OR 11 / [facility]    Anesthesia Start: 1302 Anesthesia Stop: 1414    Procedure: Appendectomy Laparoscopy (Abdomen) Diagnosis:       Acute appendicitis with localized peritonitis, without perforation, abscess, or gangrene      (Acute appendicitis with localized peritonitis, without perforation, abscess, or gangrene [K35.30])    Surgeons: Miguel RIVERA MD Responsible Provider: Rufus Caceres MD    Anesthesia Type: general ASA Status: 3            Anesthesia Type: general    Vitals Value Taken Time   BP 90/69 11/07/24 1415   Temp 36.1 °C (97 °F) 11/07/24 1408   Pulse 82 11/07/24 1415   Resp 20 11/07/24 1415   SpO2 98 % 11/07/24 1415       Anesthesia Post Evaluation    Patient location during evaluation: PACU  Patient participation: complete - patient participated  Level of consciousness: awake  Pain management: adequate  Airway patency: patent  Cardiovascular status: acceptable  Respiratory status: acceptable  Hydration status: acceptable  Postoperative Nausea and Vomiting: none        No notable events documented.    "

## 2024-11-07 NOTE — ANESTHESIA PREPROCEDURE EVALUATION
"Patient: Alexis Yap \"Melody\"    Procedure Information       Date/Time: 11/07/24 1346    Procedure: Appendectomy Laparoscopy    Location: [facility] OR 11 / [facility]    Surgeons: Miguel RIVERA MD            Relevant Problems   Cardiac   (+) Abnormal EKG   (+) Chest pain   (+) HTN (hypertension)   (+) Hypercholesteremia      Pulmonary   (+) Lung nodules      Neuro   (+) Anxiety      GI   (+) Esophageal reflux   (+) Irritable bowel syndrome with diarrhea   (+) Rectal bleeding      /Renal   (+) Urinary tract infection      Endocrine   (+) Hyperthyroidism   (+) Hypothyroidism   (+) Morbid obesity (Multi)      Musculoskeletal   (+) Knee osteoarthritis   (+) Primary osteoarthritis of both hips   (+) Primary osteoarthritis of right hip   (+) Unilateral primary osteoarthritis, right hip      HEENT   (+) Acute sinusitis      ID   (+) Candidiasis   (+) Helicobacter pylori (H. pylori) infection   (+) Urinary tract infection       Clinical information reviewed:    Allergies                NPO Detail:  No data recorded     Physical Exam    Airway  Mallampati: III  TM distance: >3 FB  Neck ROM: full     Cardiovascular    Dental    Pulmonary    Abdominal            Anesthesia Plan    History of general anesthesia?: yes  History of complications of general anesthesia?: no    ASA 3     general     intravenous induction   Anesthetic plan and risks discussed with patient.    Plan discussed with CRNA.      "

## 2024-11-07 NOTE — ED PROVIDER NOTES
HPI   Chief Complaint   Patient presents with    Abdominal Pain     Has been on monjauro for last few weeks and feels like she is having abdominal issues. Diarrhea. Lower abdominal pain that will not go away. Has had vomiting start today. And has been nauseated everyday.       HPI  Patient is a 64-year-old female who presents ED for acute abdominal pain.  Her pain is located centrally in the abdomen, does not radiate, is constant.  She complains of associated nausea and vomiting.  She believes this may be secondary to taking Mounjaro.  Denies any fever chills, cough or congestion, headache or dizziness, chest pain shortness of breath, diarrhea, urinary symptoms.  She denies any history of abdominal surgeries.  Denies any recent hospitalizations or surgeries.  Denies any recent travel or sick contacts.      Patient History   Past Medical History:   Diagnosis Date    Acute sinusitis, unspecified 08/19/2013    Acute sinusitis    Allergy status to unspecified drugs, medicaments and biological substances     History of allergy    Anxiety     Arthritis     Chronic bronchitis (Multi)     Depression     Encounter for other general examination     Podiatry visit, routine    GERD (gastroesophageal reflux disease)     Hallux rigidus, unspecified foot     Hallux rigidus    Hyperlipidemia     Hypertension     Hypothyroidism     Irritable bowel syndrome     Other gastritis without bleeding 08/11/2016    Bile reflux gastritis    Other specified anxiety disorders     Depression with anxiety    Pain in left ankle and joints of left foot     Ankle pain, left    Personal history of other diseases of the musculoskeletal system and connective tissue 08/19/2013    History of backache    Personal history of other diseases of the musculoskeletal system and connective tissue     History of tendinitis    Personal history of other diseases of the nervous system and sense organs     History of migraine headaches    Personal history of other  diseases of the respiratory system 08/19/2013    History of acute bronchitis    Personal history of other endocrine, nutritional and metabolic disease 08/19/2013    History of hypothyroidism    Personal history of other specified conditions     History of urinary frequency     Past Surgical History:   Procedure Laterality Date    BLADDER SURGERY  09/20/2017    Bladder Surgery    BUNIONECTOMY Right 09/20/2017    Simple Bunion Exostectomy ([facility] Procedure)    GALLBLADDER SURGERY  09/20/2017    Gallbladder Surgery    HEMORRHOID SURGERY  09/20/2017    Hemorrhoidectomy    HYSTERECTOMY  09/20/2017    Hysterectomy    JOINT REPLACEMENT Left     THR    OTHER SURGICAL HISTORY      rectal polyp cancer/ removed    TONSILLECTOMY  08/19/2013    Tonsillectomy     Family History   Problem Relation Name Age of Onset    Colon cancer Mother       Social History     Tobacco Use    Smoking status: Never    Smokeless tobacco: Never   Vaping Use    Vaping status: Never Used   Substance Use Topics    Alcohol use: Never     Alcohol/week: 1.0 standard drink of alcohol     Types: 1 Glasses of wine per week     Comment: occ wine every few weeks    Drug use: Never       Physical Exam   ED Triage Vitals [11/07/24 0928]   Temp Heart Rate Resp BP   36.2 °C (97.2 °F) 87 20 143/81      SpO2 Temp Source Heart Rate Source Patient Position   100 % Temporal Monitor;Brachial Sitting      BP Location FiO2 (%)     Left arm --       Physical Exam  Vitals reviewed.   Constitutional:       General: She is not in acute distress.     Appearance: Normal appearance. She is not ill-appearing.   HENT:      Head: Normocephalic and atraumatic.   Eyes:      Extraocular Movements: Extraocular movements intact.   Cardiovascular:      Rate and Rhythm: Regular rhythm.      Heart sounds: Normal heart sounds.   Pulmonary:      Effort: Pulmonary effort is normal.      Breath sounds: Normal breath sounds.   Abdominal:      General: Abdomen is flat. There is no distension.       Palpations: Abdomen is soft.      Tenderness: There is abdominal tenderness. There is no guarding.   Musculoskeletal:         General: Normal range of motion.      Cervical back: Normal range of motion and neck supple.   Skin:     General: Skin is warm and dry.   Neurological:      General: No focal deficit present.      Mental Status: She is alert and oriented to person, place, and time.   Psychiatric:         Mood and Affect: Mood normal.         Behavior: Behavior normal.           ED Course & MDM   Diagnoses as of 11/07/24 1545   Acute appendicitis with localized peritonitis, without perforation, abscess, or gangrene                 No data recorded     Akbar Coma Scale Score: 15 (11/07/24 0929 : Roosevelt De La Cruz RN)                           Medical Decision Making  Parts of this chart have been completed using voice recognition software. Please excuse any errors of transcription.  My thought process and reason for plan has been formulated from the time that I saw the patient until the time of disposition and is not specific to one specific moment during their visit and furthermore my MDM encompasses this entire chart and not only this text box.    HPI:   A medically appropriate HPI was obtained, outlined above.    Alexis Yap is a  64 y.o. female    Chief Complaint   Patient presents with    Abdominal Pain     Has been on monjauro for last few weeks and feels like she is having abdominal issues. Diarrhea. Lower abdominal pain that will not go away. Has had vomiting start today. And has been nauseated everyday.       Past Medical History:   Diagnosis Date    Acute sinusitis, unspecified 08/19/2013    Acute sinusitis    Allergy status to unspecified drugs, medicaments and biological substances     History of allergy    Anxiety     Arthritis     Chronic bronchitis (Multi)     Depression     Encounter for other general examination     Podiatry visit, routine    GERD (gastroesophageal reflux disease)     Hallux  rigidus, unspecified foot     Hallux rigidus    Hyperlipidemia     Hypertension     Hypothyroidism     Irritable bowel syndrome     Other gastritis without bleeding 08/11/2016    Bile reflux gastritis    Other specified anxiety disorders     Depression with anxiety    Pain in left ankle and joints of left foot     Ankle pain, left    Personal history of other diseases of the musculoskeletal system and connective tissue 08/19/2013    History of backache    Personal history of other diseases of the musculoskeletal system and connective tissue     History of tendinitis    Personal history of other diseases of the nervous system and sense organs     History of migraine headaches    Personal history of other diseases of the respiratory system 08/19/2013    History of acute bronchitis    Personal history of other endocrine, nutritional and metabolic disease 08/19/2013    History of hypothyroidism    Personal history of other specified conditions     History of urinary frequency       Past Surgical History:   Procedure Laterality Date    BLADDER SURGERY  09/20/2017    Bladder Surgery    BUNIONECTOMY Right 09/20/2017    Simple Bunion Exostectomy ([facility] Procedure)    GALLBLADDER SURGERY  09/20/2017    Gallbladder Surgery    HEMORRHOID SURGERY  09/20/2017    Hemorrhoidectomy    HYSTERECTOMY  09/20/2017    Hysterectomy    JOINT REPLACEMENT Left     THR    OTHER SURGICAL HISTORY      rectal polyp cancer/ removed    TONSILLECTOMY  08/19/2013    Tonsillectomy       Social History     Tobacco Use    Smoking status: Never    Smokeless tobacco: Never   Vaping Use    Vaping status: Never Used   Substance Use Topics    Alcohol use: Never     Alcohol/week: 1.0 standard drink of alcohol     Types: 1 Glasses of wine per week     Comment: occ wine every few weeks    Drug use: Never       Family History   Problem Relation Name Age of Onset    Colon cancer Mother         Allergies   Allergen Reactions    Butalbital-Acetaminop-Caf-Cod  Shortness of breath     severe stomach pain-came into the ED  Oct 17 pt states he did go to ED but felt SOB and could not catch her breath    Butalbital-Acetaminophen-Caff Shortness of breath    Codeine Shortness of breath    Anaprox [Naproxen Sodium] Rash       Current Outpatient Medications   Medication Instructions    ALPRAZolam (XANAX) 0.5 mg, oral, Daily PRN    buPROPion XL (WELLBUTRIN XL) 300 mg, oral, Every morning, Do not crush, chew, or split.    cholecalciferol (VITAMIN D-3) 125 mcg, Daily    glucosamine-chondroitin 500-400 mg tablet 1 tablet, 3 times daily    hydroCHLOROthiazide (HYDRODIURIL) 25 mg, oral, Daily    ibuprofen 800 mg, oral, Every 8 hours PRN    levothyroxine (SYNTHROID, LEVOXYL) 125 mcg, oral, Daily    MAGNESIUM CITRATE, BULK, MISC No dose, route, or frequency recorded.    magnesium oxide (MAG-OX) 400 mg, Daily    meclizine (ANTIVERT) 25 mg, oral, 3 times daily PRN    metFORMIN (GLUCOPHAGE) 500 mg, oral, 2 times daily (morning and late afternoon)    metoprolol tartrate (Lopressor) 50 mg tablet TAKE 1 TABLET TWICE A DAY (NEED APPOINTMENT, LAST FILL)    pantoprazole (PROTONIX) 40 mg, oral, Daily before breakfast, Do not crush, chew, or split.    potassium chloride CR (Klor-Con M20) 20 mEq ER tablet TAKE 1 TABLET DAILY    rosuvastatin (CRESTOR) 10 mg, oral, Daily    saccharomyces boulardii (FLORASTOR) 250 mg, Daily    semaglutide 0.5 mg, subcutaneous, Weekly    tirzepatide (Mounjaro) 10 mg/0.5 mL pen injector Inject 1 pen (10 mg) under the skin once weekly    tirzepatide (Mounjaro) 2.5 mg/0.5 mL pen injector Inject 1 pen (2.5 mg) under the skin once a week for 4 doses.    tirzepatide (Mounjaro) 5 mg/0.5 mL pen injector Inject 1 pen (5 mg) under the skin once weekly    tirzepatide (Mounjaro) 7.5 mg/0.5 mL pen injector Inject 1 pen (7.5 mg) under the skin once weekly    TURMERIC ORAL 1 capsule, Daily    vit A/C/D3/zinc/a-cyst/quercet (IMMUNE ESSENTIALS DAILY ORAL) Take by mouth.       History  "obtained from:   Patient    Exam:   Patient Vitals for the past 24 hrs:   BP Temp Temp src Pulse Resp SpO2 Height Weight   11/07/24 1530 -- -- -- -- -- 98 % -- --   11/07/24 1508 121/58 36.3 °C (97.3 °F) Oral 71 16 99 % -- --   11/07/24 1445 110/61 -- -- 73 18 98 % -- --   11/07/24 1430 111/60 -- -- 78 16 99 % -- --   11/07/24 1415 90/69 -- -- 82 20 98 % -- --   11/07/24 1408 100/58 36.1 °C (97 °F) Temporal 88 15 95 % -- --   11/07/24 1229 147/82 35.4 °C (95.7 °F) Temporal 88 16 96 % -- --   11/07/24 0928 143/81 36.2 °C (97.2 °F) Temporal 87 20 100 % 1.676 m (5' 6\") 104 kg (230 lb)       A medically appropriate exam performed, outlined above, given the known history and presentation.    EKG/Cardiac monitor:     Social Determinants of Health considered during this visit:     Medications given during visit:  Medications   sodium chloride 0.9 % with KCl 20 mEq/L infusion (75 mL/hr intravenous New Bag 11/7/24 1524)   acetaminophen (Tylenol) tablet 650 mg (has no administration in time range)   oxyCODONE (Roxicodone) immediate release tablet 5 mg (has no administration in time range)   naloxone (Narcan) injection 0.2 mg (has no administration in time range)   morphine injection 2 mg (has no administration in time range)   ondansetron ODT (Zofran-ODT) disintegrating tablet 4 mg (has no administration in time range)     Or   ondansetron (Zofran) injection 4 mg (has no administration in time range)   prochlorperazine (Compazine) tablet 10 mg (has no administration in time range)     Or   prochlorperazine (Compazine) injection 10 mg (has no administration in time range)     Or   prochlorperazine (Compazine) suppository 25 mg (has no administration in time range)   docusate sodium (Colace) capsule 100 mg (has no administration in time range)   piperacillin-tazobactam (Zosyn) 3.375 g in dextrose (iso) IV 50 mL (3.375 g intravenous New Bag 11/7/24 1523)   ketorolac (Toradol) injection 15 mg (15 mg intravenous Given 11/7/24 " 1025)   ondansetron (Zofran) injection 4 mg (4 mg intravenous Given 11/7/24 1025)   sodium chloride 0.9 % bolus 1,000 mL (0 mL intravenous Stopped 11/7/24 1145)   iohexol (OMNIPaque) 350 mg iodine/mL solution 75 mL (75 mL intravenous Given 11/7/24 1043)   cefTRIAXone (Rocephin) 2 g in dextrose (iso) IV 50 mL (2 g intravenous New Bag 11/7/24 1143)   metroNIDAZOLE (Flagyl) 500 mg in sodium chloride (iso)  mL ( intravenous MAR Unhold 11/7/24 1323)        Diagnostic/tests:  Labs Reviewed   CBC WITH AUTO DIFFERENTIAL - Abnormal       Result Value    WBC 13.1 (*)     nRBC 0.0      RBC 4.98      Hemoglobin 15.5      Hematocrit 43.2      MCV 87      MCH 31.1      MCHC 35.9      RDW 12.3      Platelets 284      Neutrophils % 76.8      Immature Granulocytes %, Automated 0.3      Lymphocytes % 15.6      Monocytes % 7.0      Eosinophils % 0.1      Basophils % 0.2      Neutrophils Absolute 10.07 (*)     Immature Granulocytes Absolute, Automated 0.04      Lymphocytes Absolute 2.04      Monocytes Absolute 0.92      Eosinophils Absolute 0.01      Basophils Absolute 0.03     COMPREHENSIVE METABOLIC PANEL - Abnormal    Glucose 105 (*)     Sodium 137      Potassium 3.5      Chloride 101      Bicarbonate 27      Anion Gap 13      Urea Nitrogen 16      Creatinine 0.68      eGFR >90      Calcium 10.1      Albumin 4.6      Alkaline Phosphatase 65      Total Protein 7.6      AST 21      Bilirubin, Total 0.7      ALT 22     URINALYSIS WITH REFLEX CULTURE AND MICROSCOPIC - Abnormal    Color, Urine Light-Yellow      Appearance, Urine Clear      Specific Gravity, Urine 1.011      pH, Urine 6.0      Protein, Urine NEGATIVE      Glucose, Urine Normal      Blood, Urine NEGATIVE      Ketones, Urine NEGATIVE      Bilirubin, Urine NEGATIVE      Urobilinogen, Urine Normal      Nitrite, Urine NEGATIVE      Leukocyte Esterase, Urine 25 Tatyana/µL (*)    MICROSCOPIC ONLY, URINE - Abnormal    WBC, Urine 1-5      RBC, Urine NONE      Squamous Epithelial  Cells, Urine 1-9 (SPARSE)      Bacteria, Urine 1+ (*)     Mucus, Urine FEW     LIPASE - Normal    Lipase 30      Narrative:     Venipuncture immediately after or during the administration of Metamizole may lead to falsely low results. Testing should be performed immediately prior to Metamizole dosing.   TROPONIN I, HIGH SENSITIVITY - Normal    Troponin I, High Sensitivity 5      Narrative:     Less than 99th percentile of normal range cutoff-  Female and children under 18 years old <14 ng/L; Male <21 ng/L: Negative  Repeat testing should be performed if clinically indicated.     Female and children under 18 years old 14-50 ng/L; Male 21-50 ng/L:  Consistent with possible cardiac damage and possible increased clinical   risk. Serial measurements may help to assess extent of myocardial damage.     >50 ng/L: Consistent with cardiac damage, increased clinical risk and  myocardial infarction. Serial measurements may help assess extent of   myocardial damage.      NOTE: Children less than 1 year old may have higher baseline troponin   levels and results should be interpreted in conjunction with the overall   clinical context.     NOTE: Troponin I testing is performed using a different   testing methodology at [facility] than at other   [facility]. Direct result comparisons should only   be made within the same method.   URINE CULTURE   URINALYSIS WITH REFLEX CULTURE AND MICROSCOPIC    Narrative:     The following orders were created for panel order Urinalysis with Reflex Culture and Microscopic.  Procedure                               Abnormality         Status                     ---------                               -----------         ------                     Urinalysis with Reflex C...[[id number]]  Abnormal            Final result               Extra Urine Gray Tube[[id number]]                            In process                   Please view results for these tests on the individual orders.    EXTRA URINE GRAY TUBE   SURGICAL PATHOLOGY EXAM        CT abdomen pelvis w IV contrast   Final Result   Findings consistent with acute appendicitis. There is an   appendicolith at the ostium of the appendix. The appendix is   diffusely enlarged measuring up to 1.4 cm in diameter. There is mild   to moderate periappendiceal inflammatory changes. No evidence for   abscess.        Diverticulosis of the colon however no evidence for acute   diverticulitis.        Signed by: Lorenzo De Leon 11/7/2024 11:27 AM   Dictation workstation:   [id number]           [facility] Summary:  Admitted to the general surgery service for acute appendicitis.  Started on antibiotics in the ED.    Disposition:  ED Prescriptions    None           Procedure  Procedures     Hansel Lopez PA-C  11/07/24 1541

## 2024-11-07 NOTE — ANESTHESIA PROCEDURE NOTES
Airway  Date/Time: 11/7/2024 1:09 PM  Urgency: elective    Airway not difficult    Staffing  Performed: CRNA   Authorized by: Rufus Caceres MD    Performed by: NOELLE Suresh-EARL  Patient location during procedure: OR    Indications and Patient Condition  Indications for airway management: anesthesia  Sedation level: deep  Preoxygenated: yes  Patient position: sniffing  Mask difficulty assessment: 1 - vent by mask    Final Airway Details  Final airway type: endotracheal airway      Successful airway: ETT  Cuffed: yes   Successful intubation technique: direct laryngoscopy  Facilitating devices/methods: intubating stylet  Endotracheal tube insertion site: oral  Blade: Stuart  Blade size: #4  ETT size (mm): 7.0  Cormack-Lehane Classification: grade III - view of epiglottis only  Placement verified by: chest auscultation   Measured from: lips  ETT to lips (cm): 21  Number of attempts at approach: 1    Additional Comments  Atraumatic placement. Teeth intact

## 2024-11-07 NOTE — H&P
"History Of Present Illness  Alexis Yap \"Ted" is a 64 y.o. female with pertinent history of hypothyroidism presenting with lower abdominal pain for 1 week. She was concerned that her pain was associated with her most recent injection of Semaglutide. The pain has been constant and worsening since the beginning of the week with associated diarrhea. Last night she had vomited bilious material and has continued to have diarrhea. Today she states her pain feels better with the analgesics but is continuing to have diarrhea. States she also felt chills. CT shows findings consistent with acute appendicitis. There is an appendicolith at the ostium of the appendix, the appendix is diffusely enlarged. Labs show a leukocytosis of 13.1. Her last meal was yesterday afternoon. Anticoagulation with Asprin 81mg. Past surgical history of cholecystectomy and hysterectomy.     Past Medical History  Past Medical History:   Diagnosis Date    Acute sinusitis, unspecified 08/19/2013    Acute sinusitis    Allergy status to unspecified drugs, medicaments and biological substances     History of allergy    Anxiety     Arthritis     Chronic bronchitis (Multi)     Depression     Encounter for other general examination     Podiatry visit, routine    GERD (gastroesophageal reflux disease)     Hallux rigidus, unspecified foot     Hallux rigidus    Hyperlipidemia     Hypertension     Hypothyroidism     Irritable bowel syndrome     Other gastritis without bleeding 08/11/2016    Bile reflux gastritis    Other specified anxiety disorders     Depression with anxiety    Pain in left ankle and joints of left foot     Ankle pain, left    Personal history of other diseases of the musculoskeletal system and connective tissue 08/19/2013    History of backache    Personal history of other diseases of the musculoskeletal system and connective tissue     History of tendinitis    Personal history of other diseases of the nervous system and sense organs  "    History of migraine headaches    Personal history of other diseases of the respiratory system 08/19/2013    History of acute bronchitis    Personal history of other endocrine, nutritional and metabolic disease 08/19/2013    History of hypothyroidism    Personal history of other specified conditions     History of urinary frequency       Surgical History  Past Surgical History:   Procedure Laterality Date    BLADDER SURGERY  09/20/2017    Bladder Surgery    BUNIONECTOMY Right 09/20/2017    Simple Bunion Exostectomy ([facility] Procedure)    GALLBLADDER SURGERY  09/20/2017    Gallbladder Surgery    HEMORRHOID SURGERY  09/20/2017    Hemorrhoidectomy    HYSTERECTOMY  09/20/2017    Hysterectomy    JOINT REPLACEMENT Left     THR    OTHER SURGICAL HISTORY      rectal polyp cancer/ removed    TONSILLECTOMY  08/19/2013    Tonsillectomy        Social History  She reports that she has never smoked. She has never used smokeless tobacco. She reports that she does not drink alcohol and does not use drugs.    Family History  Family History   Problem Relation Name Age of Onset    Colon cancer Mother          Allergies  Butalbital-acetaminop-caf-cod, Butalbital-acetaminophen-caff, Codeine, and Anaprox [naproxen sodium]    Review of Systems   Constitutional:  Positive for chills. Negative for fever.   HENT: Negative.     Respiratory: Negative.     Cardiovascular: Negative.    Gastrointestinal:  Positive for abdominal pain, diarrhea, nausea and vomiting. Negative for blood in stool.   Endocrine: Negative.    Genitourinary: Negative.    Musculoskeletal: Negative.    Skin: Negative.    Psychiatric/Behavioral: Negative.          Physical Exam  Constitutional:       General: She is not in acute distress.  HENT:      Head: Normocephalic and atraumatic.      Nose: Nose normal.      Mouth/Throat:      Mouth: Mucous membranes are moist.   Eyes:      Extraocular Movements: Extraocular movements intact.      Conjunctiva/sclera: Conjunctivae  "normal.   Cardiovascular:      Rate and Rhythm: Normal rate.   Pulmonary:      Effort: Pulmonary effort is normal. No respiratory distress.   Abdominal:      Palpations: Abdomen is soft. There is no mass.      Tenderness: There is abdominal tenderness in the right lower quadrant and suprapubic area. There is no guarding or rebound.   Musculoskeletal:         General: Normal range of motion.      Cervical back: Normal range of motion.   Skin:     General: Skin is warm and dry.   Neurological:      Mental Status: She is alert and oriented to person, place, and time.   Psychiatric:         Mood and Affect: Mood normal.         Behavior: Behavior normal.          Last Recorded Vitals  Blood pressure 143/81, pulse 87, temperature 36.2 °C (97.2 °F), temperature source Temporal, resp. rate 20, height 1.676 m (5' 6\"), weight 104 kg (230 lb), SpO2 100%.    Relevant Results    Lab Results   Component Value Date    WBC 13.1 (H) 11/07/2024    HGB 15.5 11/07/2024    HCT 43.2 11/07/2024    MCV 87 11/07/2024     11/07/2024      Lab Results   Component Value Date    GLUCOSE 105 (H) 11/07/2024    CALCIUM 10.1 11/07/2024     11/07/2024    K 3.5 11/07/2024    CO2 27 11/07/2024     11/07/2024    BUN 16 11/07/2024    CREATININE 0.68 11/07/2024      === 11/07/24 ===    CT ABDOMEN PELVIS W IV CONTRAST    - Impression -  Findings consistent with acute appendicitis. There is an  appendicolith at the ostium of the appendix. The appendix is  diffusely enlarged measuring up to 1.4 cm in diameter. There is mild  to moderate periappendiceal inflammatory changes. No evidence for  abscess.    Diverticulosis of the colon however no evidence for acute  diverticulitis.    Signed by: Lorenzo De Leon 11/7/2024 11:27 AM  Dictation workstation:   [id number]        Assessment/Plan   Assessment & Plan  Acute appendicitis with localized peritonitis, without perforation, abscess, or gangrene      CT shows evidence of acute appendicitis. " Will plan to take patient to OR for laparoscopic appendectomy today. Keep patient NPO. IVF, IV antibiotics, PRN analgesics and antiemetics.     The procedure was discussed with the patient along with risks and benefits and patient is agreeable to move forward with surgery today.  Consent has been created. Discussed with Dr. Khan.         SERGIO Acosta         I was present with the PA student who participated in the documentation of this note. I have personally seen and re-examined the patient and performed the medical decision-making components (assessment and plan of care). I have reviewed the PA student documentation and verified the findings in the note as written with additions or exceptions as stated in the body of this note.    Lou Yeboah PA-C

## 2024-11-07 NOTE — OP NOTE
"Appendectomy Laparoscopy Operative Note     Date: 2024  OR Location: [facility]    Name: Alexis Yap \"Melody\", : 1960, Age: 64 y.o., MRN: [id number], Sex: female    Diagnosis  Pre-op Diagnosis      * Acute appendicitis with localized peritonitis, without perforation, abscess, or gangrene [K35.30] Post-op Diagnosis     * Acute appendicitis with localized peritonitis, without perforation, abscess, or gangrene [K35.30]     Procedures  Appendectomy Laparoscopy  24769 - MS LAPAROSCOPIC APPENDECTOMY      Surgeons      * Miguel Khan V - Primary    Resident/Fellow/Other Assistant:  Surgeons and Role:  * No surgeons found with a matching role *    Staff:   Arenulator: Felicity Junior Scrub: Sina Pegueroub Person: Josie  Surgical Assistant: Flo    Anesthesia Staff: Anesthesiologist: Rufus Caceres MD  CRNA: NOELLE Suresh-CRNA    Procedure Summary  Anesthesia: General  ASA: III  Estimated Blood Loss: 50 mL  Intra-op Medications:   Administrations occurring from 1346 to 1506 on 24:   Medication Name Total Dose   glycopyrrolate (Robinul) injection 0.2 mg/mL 0.8 mg   ketorolac (Toradol) 30 mg 30 mg   neostigmine (Bloxiverz) 1 mg/mL injection 4 mg   ondansetron 2 mg/mL 4 mg              Anesthesia Record               Intraprocedure I/O Totals          Intake    Propofol Drip 0.00 mL    The total shown is the total volume documented since Anesthesia Start was filed.    Total Intake 0 mL          Specimen:   ID Type Source Tests Collected by Time   1 : appendix Tissue APPENDIX SURGICAL PATHOLOGY EXAM Miguel RIVERA MD 2024 1345            Findings: Focal perforated appendicitis.  Inflamed mesentery.    Indications: Alexis Yap \"Ted" is an 64 y.o. female who is having surgery for Acute appendicitis with localized peritonitis, without perforation, abscess, or gangrene [K35.30].  She presented to the emergency department with 1 week of worsening abdominal pain.  Over the last 24 hours " the patient's pain became more localized in the right lower quadrant and was associated with nausea and emesis.  She went to the emergency department.  CT scan showed concerning findings for acute appendicitis with a large appendicolith at the base of the appendix.  I explained to her the risks and benefits of going to operating room for a laparoscopic appendectomy.  These risks included the risk of bleeding, infection, or injury to surround structures.  The patient agreed to proceed with the operation.    The patient was seen in the preoperative area. The risks, benefits, complications, treatment options, non-operative alternatives, expected recovery and outcomes were discussed with the patient. The possibilities of reaction to medication, pulmonary aspiration, injury to surrounding structures, bleeding, recurrent infection, the need for additional procedures, failure to diagnose a condition, and creating a complication requiring transfusion or operation were discussed with the patient. The patient concurred with the proposed plan, giving informed consent.  The site of surgery was properly noted/marked if necessary per policy. The patient has been actively warmed in preoperative area. Preoperative antibiotics have been ordered and given within 1 hours of incision. Venous thrombosis prophylaxis have been ordered including bilateral sequential compression devices    Procedure Details:   The patient was brought to the operating room, placed supine on the operating table, and general endotracheal anesthesia was induced.  Her abdomen was then prepped and draped sterilely.  We began by making a supraumbilical incision and carried this down to fascia.  The fascia was incised, a Velarde trocar was placed, and the abdomen was insufflated to 15 mmHg.  We placed additional 5 mm port in the suprapubic area, and another 5 mm port in the left lower quadrant.  Later in the case we placed an additional 5 mm port in the right lower  quadrant to help with exposure.  On inspection of the abdomen she was noted to have some omental adhesions from her prior hysterectomy.  These were taken down using a LigaSure device.  We then quickly identified an acutely inflamed appendix with some periappendiceal fluid.  We suctioned up some of this focal perforated fluid.  We then grasped the appendix.  We used a LigaSure device in order to dissect the mesentery of the appendix down to its base.  There was some bleeding from the mesentery, which was controlled with surgical clips and Surgicel.  Once we had dissected the appendix down to its base, we used a white load on an Endo SHAGGY stapler to transect the appendix off of the cecum.  We placed the appendix into an Endo Catch bag.  We used a suction  in order to suction out some of the blood and purulent fluid in the right lower quadrant.  We then desufflated the abdomen.  We closed the supraumbilical incision using 0 Vicryl suture.  We closed remains incisions using 3-0 Vicryl followed by 4-0 Monocryl, and Dermabond for the skin.  At the end of the procedure all needle, sponge, and instrument counts were correct.  The patient tolerated the procedure well and was brought to the postanesthesia care unit for recovery.      Complications:  None; patient tolerated the procedure well.    Disposition: PACU - hemodynamically stable.  Condition: stable     Attending Attestation: I was present and scrubbed for the entire procedure.    Miguel Khan V  Phone Number: [phone]

## 2024-11-07 NOTE — NURSING NOTE
Assumed care of pt. Pt transferred from PACU. No complaints or concerns at this time from pt. Bed alarm on for pt safety. Call light within reach.

## 2024-11-08 VITALS
HEIGHT: 66 IN | HEART RATE: 87 BPM | RESPIRATION RATE: 18 BRPM | OXYGEN SATURATION: 95 % | WEIGHT: 240.74 LBS | TEMPERATURE: 98 F | BODY MASS INDEX: 38.69 KG/M2 | DIASTOLIC BLOOD PRESSURE: 49 MMHG | SYSTOLIC BLOOD PRESSURE: 103 MMHG

## 2024-11-08 PROBLEM — K35.30 ACUTE APPENDICITIS WITH LOCALIZED PERITONITIS, WITHOUT PERFORATION, ABSCESS, OR GANGRENE: Status: RESOLVED | Noted: 2024-11-07 | Resolved: 2024-11-08

## 2024-11-08 PROBLEM — K35.30 ACUTE APPENDICITIS WITH LOCALIZED PERITONITIS, WITHOUT PERFORATION, ABSCESS, OR GANGRENE: Status: ACTIVE | Noted: 2024-11-08

## 2024-11-08 PROBLEM — K35.80 ACUTE APPENDICITIS: Status: RESOLVED | Noted: 2024-11-07 | Resolved: 2024-11-08

## 2024-11-08 LAB
ALBUMIN SERPL BCP-MCNC: 3.4 G/DL (ref 3.4–5)
ALP SERPL-CCNC: 54 U/L (ref 33–136)
ALT SERPL W P-5'-P-CCNC: 15 U/L (ref 7–45)
ANION GAP SERPL CALCULATED.3IONS-SCNC: 10 MMOL/L (ref 10–20)
AST SERPL W P-5'-P-CCNC: 16 U/L (ref 9–39)
BILIRUB SERPL-MCNC: 1.1 MG/DL (ref 0–1.2)
BUN SERPL-MCNC: 11 MG/DL (ref 6–23)
CALCIUM SERPL-MCNC: 8.2 MG/DL (ref 8.6–10.3)
CHLORIDE SERPL-SCNC: 107 MMOL/L (ref 98–107)
CO2 SERPL-SCNC: 25 MMOL/L (ref 21–32)
CREAT SERPL-MCNC: 0.74 MG/DL (ref 0.5–1.05)
EGFRCR SERPLBLD CKD-EPI 2021: >90 ML/MIN/1.73M*2
ERYTHROCYTE [DISTWIDTH] IN BLOOD BY AUTOMATED COUNT: 12.5 % (ref 11.5–14.5)
GLUCOSE SERPL-MCNC: 145 MG/DL (ref 74–99)
HCT VFR BLD AUTO: 36.5 % (ref 36–46)
HGB BLD-MCNC: 12.7 G/DL (ref 12–16)
MCH RBC QN AUTO: 30.7 PG (ref 26–34)
MCHC RBC AUTO-ENTMCNC: 34.8 G/DL (ref 32–36)
MCV RBC AUTO: 88 FL (ref 80–100)
NRBC BLD-RTO: 0 /100 WBCS (ref 0–0)
PLATELET # BLD AUTO: 227 X10*3/UL (ref 150–450)
POTASSIUM SERPL-SCNC: 3.2 MMOL/L (ref 3.5–5.3)
PROT SERPL-MCNC: 5.9 G/DL (ref 6.4–8.2)
RBC # BLD AUTO: 4.14 X10*6/UL (ref 4–5.2)
SODIUM SERPL-SCNC: 139 MMOL/L (ref 136–145)
WBC # BLD AUTO: 16.1 X10*3/UL (ref 4.4–11.3)

## 2024-11-08 PROCEDURE — 99024 POSTOP FOLLOW-UP VISIT: CPT | Performed by: NURSE PRACTITIONER

## 2024-11-08 PROCEDURE — 2500000002 HC RX 250 W HCPCS SELF ADMINISTERED DRUGS (ALT 637 FOR MEDICARE OP, ALT 636 FOR OP/ED): Performed by: SURGERY

## 2024-11-08 PROCEDURE — 2500000001 HC RX 250 WO HCPCS SELF ADMINISTERED DRUGS (ALT 637 FOR MEDICARE OP): Performed by: SURGERY

## 2024-11-08 PROCEDURE — 2500000001 HC RX 250 WO HCPCS SELF ADMINISTERED DRUGS (ALT 637 FOR MEDICARE OP): Performed by: NURSE PRACTITIONER

## 2024-11-08 PROCEDURE — 2500000004 HC RX 250 GENERAL PHARMACY W/ HCPCS (ALT 636 FOR OP/ED): Performed by: SURGERY

## 2024-11-08 PROCEDURE — 85027 COMPLETE CBC AUTOMATED: CPT | Performed by: SURGERY

## 2024-11-08 PROCEDURE — 1100000001 HC PRIVATE ROOM DAILY

## 2024-11-08 PROCEDURE — 36415 COLL VENOUS BLD VENIPUNCTURE: CPT | Performed by: SURGERY

## 2024-11-08 PROCEDURE — 80053 COMPREHEN METABOLIC PANEL: CPT | Performed by: SURGERY

## 2024-11-08 RX ORDER — METRONIDAZOLE 500 MG/1
500 TABLET ORAL 3 TIMES DAILY
Qty: 21 TABLET | Refills: 0 | Status: SHIPPED | OUTPATIENT
Start: 2024-11-08 | End: 2024-11-15

## 2024-11-08 RX ORDER — BUPROPION HYDROCHLORIDE 300 MG/1
300 TABLET ORAL DAILY
Status: DISCONTINUED | OUTPATIENT
Start: 2024-11-08 | End: 2024-11-08 | Stop reason: HOSPADM

## 2024-11-08 RX ORDER — OXYCODONE HYDROCHLORIDE 5 MG/1
5 TABLET ORAL EVERY 6 HOURS PRN
Qty: 15 TABLET | Refills: 0 | Status: SHIPPED | OUTPATIENT
Start: 2024-11-08

## 2024-11-08 RX ORDER — LEVOTHYROXINE SODIUM 125 UG/1
125 TABLET ORAL DAILY
Status: DISCONTINUED | OUTPATIENT
Start: 2024-11-08 | End: 2024-11-08 | Stop reason: HOSPADM

## 2024-11-08 RX ORDER — DOCUSATE SODIUM 100 MG/1
100 CAPSULE, LIQUID FILLED ORAL 2 TIMES DAILY
Qty: 30 CAPSULE | Refills: 0 | Status: SHIPPED | OUTPATIENT
Start: 2024-11-08

## 2024-11-08 RX ORDER — ACETAMINOPHEN 325 MG/1
650 TABLET ORAL EVERY 4 HOURS PRN
Qty: 30 TABLET | Refills: 0 | Status: SHIPPED | OUTPATIENT
Start: 2024-11-08

## 2024-11-08 RX ORDER — POTASSIUM CHLORIDE 1.5 G/1.58G
20 POWDER, FOR SOLUTION ORAL
Status: DISCONTINUED | OUTPATIENT
Start: 2024-11-08 | End: 2024-11-08 | Stop reason: HOSPADM

## 2024-11-08 RX ORDER — AMOXICILLIN AND CLAVULANATE POTASSIUM 875; 125 MG/1; MG/1
1 TABLET, FILM COATED ORAL 2 TIMES DAILY
Qty: 14 TABLET | Refills: 0 | Status: SHIPPED | OUTPATIENT
Start: 2024-11-08 | End: 2024-11-15

## 2024-11-08 ASSESSMENT — COGNITIVE AND FUNCTIONAL STATUS - GENERAL
WALKING IN HOSPITAL ROOM: A LITTLE
CLIMB 3 TO 5 STEPS WITH RAILING: A LITTLE
MOBILITY SCORE: 21
DAILY ACTIVITIY SCORE: 24
DAILY ACTIVITIY SCORE: 24
MOBILITY SCORE: 23
STANDING UP FROM CHAIR USING ARMS: A LITTLE
CLIMB 3 TO 5 STEPS WITH RAILING: A LITTLE

## 2024-11-08 ASSESSMENT — PAIN SCALES - GENERAL
PAINLEVEL_OUTOF10: 0 - NO PAIN
PAINLEVEL_OUTOF10: 5 - MODERATE PAIN
PAINLEVEL_OUTOF10: 5 - MODERATE PAIN

## 2024-11-08 ASSESSMENT — PAIN - FUNCTIONAL ASSESSMENT
PAIN_FUNCTIONAL_ASSESSMENT: 0-10

## 2024-11-08 ASSESSMENT — PAIN DESCRIPTION - LOCATION: LOCATION: ABDOMEN

## 2024-11-08 ASSESSMENT — PAIN DESCRIPTION - ORIENTATION: ORIENTATION: RIGHT

## 2024-11-08 NOTE — DISCHARGE SUMMARY
Discharge Diagnosis  Acute appendicitis with localized peritonitis, without perforation, abscess, or gangrene    Issues Requiring Follow-Up  Follow up with Dr. Khan as directed withing two weeks of surgery    Test Results Pending At Discharge  Pending Labs       Order Current Status    Surgical Pathology Exam In process    Urine Culture In process            Hospital Course   11/7/24 Underwent laparoscopic appendectomy for appendicitis and microperforation.     Pertinent Physical Exam At Time of Discharge  Physical Exam  Constitutional:       Appearance: Normal appearance.   HENT:      Head: Normocephalic and atraumatic.      Right Ear: Tympanic membrane normal.      Nose: Nose normal.      Mouth/Throat:      Mouth: Mucous membranes are moist.   Eyes:      Pupils: Pupils are equal, round, and reactive to light.   Cardiovascular:      Rate and Rhythm: Normal rate and regular rhythm.      Pulses: Normal pulses.   Pulmonary:      Effort: Pulmonary effort is normal.      Breath sounds: Normal breath sounds.   Abdominal:      General: Bowel sounds are normal. There is no distension.      Palpations: Abdomen is soft.      Tenderness: There is no abdominal tenderness. There is no guarding.      Hernia: No hernia is present.      Comments: Abdominal lap sites are CDI with skin glue. No peritonitis.    Genitourinary:     Rectum: Normal.   Musculoskeletal:         General: Normal range of motion.   Skin:     General: Skin is warm and dry.   Neurological:      General: No focal deficit present.      Mental Status: She is alert.   Psychiatric:         Mood and Affect: Mood normal.         Behavior: Behavior normal.         Home Medications     Medication List      START taking these medications     acetaminophen 325 mg tablet; Commonly known as: Tylenol; Take 2 tablets   (650 mg) by mouth every 4 hours if needed for moderate pain (4 - 6).   ALPRAZolam 0.5 mg tablet; Commonly known as: Xanax; Take 1 tablet (0.5   mg) by mouth  once daily as needed for anxiety.   amoxicillin-pot clavulanate 875-125 mg tablet; Commonly known as:   Augmentin; Take 1 tablet by mouth 2 times a day for 7 days.   docusate sodium 100 mg capsule; Commonly known as: Colace; Take 1   capsule (100 mg) by mouth 2 times a day.   metroNIDAZOLE 500 mg tablet; Commonly known as: Flagyl; Take 1 tablet   (500 mg) by mouth 3 times a day for 7 days.   oxyCODONE 5 mg immediate release tablet; Commonly known as: Roxicodone;   Take 1 tablet (5 mg) by mouth every 6 hours if needed for severe pain (7 -   10).     CONTINUE taking these medications     buPROPion  mg 24 hr tablet; Commonly known as: Wellbutrin XL; Take   1 tablet (300 mg) by mouth once daily in the morning. Do not crush, chew,   or split.   cholecalciferol 125 mcg (5000 UT) capsule; Commonly known as: Vitamin   D-3   glucosamine-chondroitin 500-400 mg tablet   hydroCHLOROthiazide 25 mg tablet; Commonly known as: HYDRODiuril; TAKE 1   TABLET DAILY   ibuprofen 800 mg tablet; Take 1 tablet (800 mg) by mouth every 8 hours   if needed for mild pain (1 - 3).   IMMUNE ESSENTIALS DAILY ORAL   levothyroxine 125 mcg tablet; Commonly known as: Synthroid, Levoxyl;   TAKE 1 TABLET DAILY   MAGNESIUM CITRATE (BULK) MISC   magnesium oxide 400 mg tablet; Commonly known as: Mag-Ox   meclizine 25 mg tablet; Commonly known as: Antivert; Take 1 tablet (25   mg) by mouth 3 times a day as needed for dizziness.   metFORMIN 500 mg tablet; Commonly known as: Glucophage; Take 1 tablet   (500 mg) by mouth 2 times daily (morning and late afternoon).   metoprolol tartrate 50 mg tablet; Commonly known as: Lopressor; TAKE 1   TABLET TWICE A DAY (NEED APPOINTMENT, LAST FILL)   * Mounjaro 2.5 mg/0.5 mL pen injector; Generic drug: tirzepatide; Inject   1 pen (2.5 mg) under the skin once a week for 4 doses.   * Mounjaro 7.5 mg/0.5 mL pen injector; Generic drug: tirzepatide; Inject   1 pen (7.5 mg) under the skin once weekly   * Mounjaro 10 mg/0.5  mL pen injector; Generic drug: tirzepatide; Inject   1 pen (10 mg) under the skin once weekly   * Mounjaro 5 mg/0.5 mL pen injector; Generic drug: tirzepatide; Inject 1   pen (5 mg) under the skin once weekly   pantoprazole 40 mg EC tablet; Commonly known as: ProtoNix; Take 1 tablet   (40 mg) by mouth once daily in the morning. Take before meals. Do not   crush, chew, or split.   potassium chloride CR 20 mEq ER tablet; Commonly known as: Klor-Con M20;   TAKE 1 TABLET DAILY   rosuvastatin 10 mg tablet; Commonly known as: Crestor; TAKE 1 TABLET   DAILY   saccharomyces boulardii 250 mg capsule; Commonly known as: Florastor   semaglutide 0.25 mg or 0.5 mg (2 mg/3 mL) pen injector; Inject 0.5 mg   under the skin 1 (one) time per week.   TURMERIC ORAL  * This list has 4 medication(s) that are the same as other medications   prescribed for you. Read the directions carefully, and ask your doctor or   other care provider to review them with you.       Outpatient Follow-Up  Future Appointments   Date Time Provider Department Center   11/14/2024 10:45 AM Elier Kruger MD [id number] [facility]   12/10/2024 10:40 AM Neal Tanner MD [id number] [facility]   12/13/2024  9:00 AM Elier Kruger MD [id number] [facility]   2/24/2025  2:30 PM MD KASIA [id number] [facility]   3/3/2025  9:00 AM MD SHAJI SternRTJuan [facility]       Dior Schwartz, APRN-CNP

## 2024-11-08 NOTE — CONSULTS
"Nutrition Assessement Note    Nutrition Assessment    Reason for Assessment: Admission nursing screening (MST 3)    Malnutrition Screening Tool (MST)  Have you recently lost weight without trying?: Yes  If yes, how much weight have you lost?: Lost 14 - 23 pounds  Weight Loss Score: 2  Have you been eating poorly because of a decreased appetite?: Yes  Malnutrition Score: 3  Nutrition Screen  Stage 3 or 4 Pressure Injury or Multiple Non-Healing Wounds: No  Home Tube Feeding or Total Parenteral Nutrition (TPN): No  Dietitian Consult Needed: Yes (Comment)  Reason for Consult: Poor appetite    Reason for Hospital Admission:  Alexis Yap \"Melody\" is a 64 y.o. female who is admitted for acute appendicitis.     Spoke with pt at bedside. Pt reported that she has lost weight and has had a poor PO intake due to taking Mounjuro. Pt reported that she drinks fairlife or premier protein once daily. Pt reported to be consuming 50% of her usual PO intake for the past 7 weeks. Pt had appendectomy on 11/7. Discussed carbohydrate control diet due to elevated blood glucose. Will provide Ensure high protein once daily.    Past Medical History:   Diagnosis Date    Acute sinusitis, unspecified 08/19/2013    Acute sinusitis    Allergy status to unspecified drugs, medicaments and biological substances     History of allergy    Anxiety     Arthritis     Chronic bronchitis (Multi)     Depression     Encounter for other general examination     Podiatry visit, routine    GERD (gastroesophageal reflux disease)     Hallux rigidus, unspecified foot     Hallux rigidus    Hyperlipidemia     Hypertension     Hypothyroidism     Irritable bowel syndrome     Other gastritis without bleeding 08/11/2016    Bile reflux gastritis    Other specified anxiety disorders     Depression with anxiety    Pain in left ankle and joints of left foot     Ankle pain, left    Personal history of other diseases of the musculoskeletal system and connective tissue " "08/19/2013    History of backache    Personal history of other diseases of the musculoskeletal system and connective tissue     History of tendinitis    Personal history of other diseases of the nervous system and sense organs     History of migraine headaches    Personal history of other diseases of the respiratory system 08/19/2013    History of acute bronchitis    Personal history of other endocrine, nutritional and metabolic disease 08/19/2013    History of hypothyroidism    Personal history of other specified conditions     History of urinary frequency      Past Surgical History:   Procedure Laterality Date    BLADDER SURGERY  09/20/2017    Bladder Surgery    BUNIONECTOMY Right 09/20/2017    Simple Bunion Exostectomy ([facility] Procedure)    GALLBLADDER SURGERY  09/20/2017    Gallbladder Surgery    HEMORRHOID SURGERY  09/20/2017    Hemorrhoidectomy    HYSTERECTOMY  09/20/2017    Hysterectomy    JOINT REPLACEMENT Left     THR    OTHER SURGICAL HISTORY      rectal polyp cancer/ removed    TONSILLECTOMY  08/19/2013    Tonsillectomy       Nutrition History:  Food and Nutrient History: Pt reported that she has not been eating well since on Mounjuro.  Energy Intake: Poor < 50 %     Food Allergies/Intolerances:  None  GI Symptoms: None  Oral Problems: None    Anthropometrics:  Ht: 167.6 cm (5' 6\"), Wt: 109 kg (240 lb 11.9 oz), BMI: 38.88  IBW/kg (Dietitian Calculated): 59.09 kg  Percent of IBW: 184.62 %  Adjusted Body Weight (kg): 71.82 kg    Weight Change:  Daily Weight  11/08/24 : 109 kg (240 lb 11.9 oz)  10/18/24 : 108 kg (238 lb)  09/20/24 : 112 kg (247 lb)  08/02/24 : 111 kg (244 lb)  07/05/24 : 113 kg (249 lb)  02/27/24 : 108 kg (237 lb 7 oz)  02/19/24 : 109 kg (241 lb)  02/15/24 : 110 kg (241 lb 13.5 oz)  01/26/24 : 111 kg (244 lb)  01/08/24 : 110 kg (243 lb)     Weight History / % Weight Change: Pt reported 16# weight loss due to Manjuro over 7 weeks             Nutrition Focused Physical Exam Findings:         "               Nutrition Significant Labs:  Lab Results   Component Value Date    WBC 16.1 (H) 11/08/2024    HGB 12.7 11/08/2024    HCT 36.5 11/08/2024     11/08/2024    CHOL 160 10/16/2024    TRIG 132 10/16/2024    HDL 45.2 10/16/2024    ALT 15 11/08/2024    AST 16 11/08/2024     11/08/2024    K 3.2 (L) 11/08/2024     11/08/2024    CREATININE 0.74 11/08/2024    BUN 11 11/08/2024    CO2 25 11/08/2024    TSH 2.34 10/16/2024    INR 0.9 02/15/2024    HGBA1C 6.1 (H) 10/16/2024     Nutrition Specific Medications:  buPROPion XL, 300 mg, oral, Daily  docusate sodium, 100 mg, oral, BID  levothyroxine, 125 mcg, oral, Daily  metoprolol tartrate, 50 mg, oral, BID  piperacillin-tazobactam, 3.375 g, intravenous, q6h  potassium chloride, 20 mEq, oral, BID      potassium chloride in 0.9%NaCl, 75 mL/hr, Last Rate: Stopped (11/08/24 0710)        Dietary Orders (From admission, onward)       Start     Ordered    11/07/24 1557  May Participate in Room Service  ( ROOM SERVICE MAY PARTICIPATE)  Once        Question:  .  Answer:  Yes    11/07/24 1556    11/07/24 1459  Adult diet Regular  Diet effective now        Question:  Diet type  Answer:  Regular    11/07/24 1458                  Estimated Needs:   Estimated Energy Needs  Total Energy Estimated Needs (kCal): 1796 kCal  Total Estimated Energy Need per Day (kCal/kg): 25 kCal/kg  Method for Estimating Needs: ABW    Estimated Protein Needs  Total Protein Estimated Needs (g): 72 g  Total Protein Estimated Needs (g/kg): 1 g/kg  Method for Estimating Needs: ABW    Estimated Fluid Needs  Total Fluid Estimated Needs (mL): 1796 mL  Method for Estimating Needs: 1 mL/kcal        Nutrition Diagnosis   Nutrition Diagnosis:       Nutrition Diagnosis  Patient has Nutrition Diagnosis: Yes  Diagnosis Status (1): New  Nutrition Diagnosis 1: Inadequate energy intake  Related to (1): decreased ability to consume sufficient energy  As Evidenced by (1): poor PO intake  Additional  Nutrition Diagnosis: Diagnosis 2  Diagnosis Status (2): New  Nutrition Diagnosis 2: Food and nutrition related knowledge deficit  Related to (2): needs review of diet education  As Evidenced by (2): pt request       Nutrition Interventions/Recommendations   Nutrition Interventions and Recommendations:    Nutrition Prescription:  Individualized Nutrition Prescription Provided for : 1796 kcals, 72 g protein via diet    Nutrition Interventions:   Food and/or Nutrient Delivery Interventions  Interventions: Meals and snacks, Medical food supplement  Meals and Snacks: General healthful diet  Goal: recommend a 60 gm carbohydrate control diet  Medical Food Supplement: Commercial beverage  Goal: ensure high protein once daily to provide 160 kcals and 16g protein    Education Documentation  No documentation found.           Nutrition Monitoring and Evaluation   Monitoring/Evaluation:   Food/Nutrient Related History Monitoring  Monitoring and Evaluation Plan: Energy intake  Energy Intake: Estimated energy intake  Criteria: pt to consume >/= 75% estimated needs  Additional Plans: pt will plan meals within prescribed guidelines         Biochemical Data, Medical Tests and Procedures  Monitoring and Evaluation Plan: Glucose/endocrine profile  Glucose/Endocrine Profile: Glucose, casual, Glucose, fasting, Hemoglobin A1c (HgbA1c)  Criteria: labs will trend towards desirable range         Time Spent/Follow-up:   Follow Up  Time Spent (min): 30 minutes  Last Date of Nutrition Visit: 11/08/24  Nutrition Follow-Up Needed?: 3-5 days  Follow up Comment: 11/13/24

## 2024-11-08 NOTE — NURSING NOTE
Assumed care of pt. Pt sitting in chair. Dr. Blanco at bedside. No complaints or concerns addressed at this time. Pt to continue on IV antibiotics per order. Call light within pt's reach.

## 2024-11-08 NOTE — NURSING NOTE
Pt walked to bathroom and sitting in chair. No complaints or concerns addressed at this time. O2 at 2L per NC per order. PT continues on continuous SPO2 per order. Pt continues on IV fluids per order. Call light within reach.

## 2024-11-08 NOTE — DISCHARGE INSTRUCTIONS
"Wound Care  Do not submerge incisions in pool, bath, or hot tub  Do not peel off Dermabond -it will gradually loosen and fall off  ° You may shower with your back to the water and pat incisions dry  Do not apply any lotions, creams, or ointments to your incisions  Activity  Do not push, pull, or lift anything.  Avoid excessive bending and twisting at the waist.  You may walk stairs.  You may sleep in any position that feels comfortable.  You must get up and walk every hour during the day.  If you plan to take a nap during the day, set an alarm for one hour so you will get up and walk around.  Potential Complications   Wound Infection - redness, increased warmth, pain, thick drainage, fever  Blood Clot/Pulmonary Embolism - calf pain or swelling, chest pain, shortness of breath, racing heart, fast breathing  CALL 9-1-1 WITH ANY CHEST PAIN OR SHORTNESS OF BREATH, otherwise call the office with any concerns at [phone].     No bending, twisting, pulling, pushing,  or lifting over 15 pounds for 4 more weeks.   Appendectomy - Discharge instructions    The Basics  Written by the doctors and editors at [facility]  What are discharge instructions? -- Discharge instructions are information about how to take care of yourself after getting medical care for a health problem.  What is appendectomy? -- Appendectomy is the main treatment for appendicitis (when the appendix gets infected and inflamed). This surgery can be done in 2 ways:  ? Open surgery - During an open surgery, the doctor makes a cut, or \"incision,\" in the skin. This allows them to see directly inside the body when they do the surgery.  ? Minimally invasive surgery - \"Minimally invasive\" surgery lets the doctor make smaller cuts in the skin. They insert long, thin tools through the cuts. One of the tools has a camera (called a \"laparoscope\") on the end, which sends pictures to a TV screen. The doctor can look at the screen to see inside the body. Then, they use " "the long tools to do the surgery. They can control the tools directly, or with the help of a robot (this is called \"robot-assisted\" surgery).  You might be able to return to normal activities sooner if you had minimally invasive surgery than if you had an open surgery.  If your appendix has burst, your surgery will probably be more complicated than it would be if it had not burst. Your doctor needs to wash away the material that spills out when the appendix burst. As a result, your incisions might be larger or you might spend more time in surgery. Your recovery might also take longer.  How can I care for myself at home? -- Ask the doctor or nurse what you should do when you go home. Make sure that you understand exactly what you need to do to care for yourself. Ask questions if there is anything you do not understand.  You should also:  ? Take care of your incision - You might have stitches, skin staples, surgical glue, or a special skin tape on your incision. If you had minimally invasive surgery, you might have more than 1 incision.  ? Keep your incision dry and covered with a bandage for the first 1 to 2 days after surgery. Your doctor or nurse will tell you exactly how long to keep your incision dry.  ? Once you no longer need to keep your incision dry, gently wash it with soap and water whenever you take a shower. Do not put your incision underwater, such as in a bath, pool, or lake. Getting it too wet can slow healing and raise your chance of getting an infection.  ? After you wash your incision, pat it dry. Your doctor or nurse will tell you if you need to put an antibiotic ointment on the incision. They will also tell you if you need to cover your incision with a bandage or gauze.  ? Always wash your hands before and after you touch your incision or bandage.  ? Increase your activity slowly - Start with short walks around your home, and walk a little more each day.  ? Keep coughing and doing deep breathing " exercises for 7 to 10 days after you go home. This will help prevent lung infections. When you cough, sneeze, or do deep breathing exercises, press a pillow across your incision to support the wound and ease pain.  ? Avoid heavy lifting, sports, and swimming for at least a week or 2. (Your doctor or nurse will tell you exactly how long to avoid these activities.)  ? Eat when you are hungry - If you have an upset stomach, it might help to start with clear liquids and foods that are easy to digest, like soup, pudding, toast, or eggs. You can eat other types of foods when you feel ready.  ? Use a stool softener to help prevent constipation, if needed. This is a common problem if you take opioid pain medicines. Follow all instructions for taking your pain medicines.  ? Be aware that if you had minimally invasive surgery, you might have some pain in your shoulder. This is from the gas the doctor put into your belly for the surgery. Walking and moving around will help reduce the gas and ease the pain.  What follow-up care do I need? -- The doctor will want to see you again after surgery to check on your progress. Go to these appointments.  If you have stitches or staples, you will need to have them taken out. Your doctor will often want to do this in 1 to 2 weeks. If the doctor used skin glue or tape, it will fall off on its own. Do not pick at it or try to remove it yourself.  When should I call the doctor? -- Call for advice if:  ? You have a fever of 100.4°F (38°C) or higher, or chills.  ? You have redness or swelling around the incisions from your surgery.  ? You have nausea or vomiting for more than 2 days after going home.  ? You have any symptoms that worry you.  All topics are updated as new evidence becomes available and our peer review process is complete.  This topic retrieved from [employer] on: May 30, 2024.  Topic 468473 Version 1.0  Release: 32.5.3 - C32.150  © 2024 UpToDate, Inc. and/or its affiliates. All  rights reserved.  Consumer Information Use and Disclaimer  Disclaimer: This generalized information is a limited summary of diagnosis, treatment, and/or medication information. It is not meant to be comprehensive and should be used as a tool to help the user understand and/or assess potential diagnostic and treatment options. It does NOT include all information about conditions, treatments, medications, side effects, or risks that may apply to a specific patient. It is not intended to be medical advice or a substitute for the medical advice, diagnosis, or treatment of a health care provider based on the health care provider's examination and assessment of a patient's specific and unique circumstances. Patients must speak with a health care provider for complete information about their health, medical questions, and treatment options, including any risks or benefits regarding use of medications. This information does not endorse any treatments or medications as safe, effective, or approved for treating a specific patient. UpToDate, Inc. and its affiliates disclaim any warranty or liability relating to this information or the use thereof.The use of this information is governed by the Terms of Use, available at https://www.woltersQuote Rolleruwer.com/en/know/clinical-effectiveness-terms. 2024© UpToDate, Inc. and its affiliates and/or licensors. All rights reserved.  © 2024 [employer], Inc. and/or its affiliates. All rights reserved.

## 2024-11-08 NOTE — CARE PLAN
The patient's goals for the shift include comfort, stable O2 levels, sit in chair for meals.    The clinical goals for the shift include pain management, ambulation, stable vital signs, safety.

## 2024-11-08 NOTE — NURSING NOTE
Messaged Dr. Khan via secure chat to ask if we can stop IV fluids. Per secure chat message ok to stop.

## 2024-11-08 NOTE — NURSING NOTE
Assumed patient care. Patient is sitting up in the chair. Fluids are running at 75ml. Patient is asking if she will be able to take her metoprolol tonight. Secure chatted Dr Khan for medication to be added.

## 2024-11-09 LAB — BACTERIA UR CULT: NORMAL

## 2024-11-12 LAB
LABORATORY COMMENT REPORT: NORMAL
PATH REPORT.FINAL DX SPEC: NORMAL
PATH REPORT.GROSS SPEC: NORMAL
PATH REPORT.RELEVANT HX SPEC: NORMAL
PATH REPORT.TOTAL CANCER: NORMAL

## 2024-11-14 ENCOUNTER — HOSPITAL ENCOUNTER (OUTPATIENT)
Dept: RADIOLOGY | Facility: CLINIC | Age: 64
Discharge: HOME | End: 2024-11-14
Payer: COMMERCIAL

## 2024-11-14 ENCOUNTER — APPOINTMENT (OUTPATIENT)
Dept: PRIMARY CARE | Facility: CLINIC | Age: 64
End: 2024-11-14
Payer: COMMERCIAL

## 2024-11-14 VITALS
DIASTOLIC BLOOD PRESSURE: 82 MMHG | SYSTOLIC BLOOD PRESSURE: 132 MMHG | WEIGHT: 228 LBS | BODY MASS INDEX: 36.64 KG/M2 | HEIGHT: 66 IN

## 2024-11-14 DIAGNOSIS — I26.99 PULMONARY EMBOLISM, UNSPECIFIED CHRONICITY, UNSPECIFIED PULMONARY EMBOLISM TYPE, UNSPECIFIED WHETHER ACUTE COR PULMONALE PRESENT (MULTI): ICD-10-CM

## 2024-11-14 DIAGNOSIS — Z90.49 STATUS POST APPENDECTOMY: Primary | ICD-10-CM

## 2024-11-14 DIAGNOSIS — L30.9 DERMATITIS: ICD-10-CM

## 2024-11-14 DIAGNOSIS — Z79.4 TYPE 2 DIABETES MELLITUS WITHOUT COMPLICATION, WITH LONG-TERM CURRENT USE OF INSULIN (MULTI): ICD-10-CM

## 2024-11-14 DIAGNOSIS — E03.9 HYPOTHYROIDISM, UNSPECIFIED TYPE: ICD-10-CM

## 2024-11-14 DIAGNOSIS — E78.00 HYPERCHOLESTEREMIA: ICD-10-CM

## 2024-11-14 DIAGNOSIS — E11.9 TYPE 2 DIABETES MELLITUS WITHOUT COMPLICATION, WITH LONG-TERM CURRENT USE OF INSULIN (MULTI): ICD-10-CM

## 2024-11-14 DIAGNOSIS — B37.9 YEAST INFECTION: ICD-10-CM

## 2024-11-14 DIAGNOSIS — I10 HYPERTENSION, UNSPECIFIED TYPE: ICD-10-CM

## 2024-11-14 PROCEDURE — 3079F DIAST BP 80-89 MM HG: CPT | Performed by: INTERNAL MEDICINE

## 2024-11-14 PROCEDURE — 3008F BODY MASS INDEX DOCD: CPT | Performed by: INTERNAL MEDICINE

## 2024-11-14 PROCEDURE — 71275 CT ANGIOGRAPHY CHEST: CPT | Performed by: RADIOLOGY

## 2024-11-14 PROCEDURE — 71275 CT ANGIOGRAPHY CHEST: CPT

## 2024-11-14 PROCEDURE — 3075F SYST BP GE 130 - 139MM HG: CPT | Performed by: INTERNAL MEDICINE

## 2024-11-14 PROCEDURE — 99214 OFFICE O/P EST MOD 30 MIN: CPT | Performed by: INTERNAL MEDICINE

## 2024-11-14 PROCEDURE — 3044F HG A1C LEVEL LT 7.0%: CPT | Performed by: INTERNAL MEDICINE

## 2024-11-14 PROCEDURE — 3048F LDL-C <100 MG/DL: CPT | Performed by: INTERNAL MEDICINE

## 2024-11-14 PROCEDURE — 2550000001 HC RX 255 CONTRASTS: Performed by: INTERNAL MEDICINE

## 2024-11-14 RX ORDER — FLUCONAZOLE 150 MG/1
150 TABLET ORAL ONCE
Qty: 1 TABLET | Refills: 0 | Status: SHIPPED | OUTPATIENT
Start: 2024-11-14 | End: 2024-11-14

## 2024-11-14 RX ORDER — POTASSIUM CHLORIDE 20 MEQ/1
TABLET, EXTENDED RELEASE ORAL
Qty: 90 TABLET | Refills: 1 | Status: SHIPPED | OUTPATIENT
Start: 2024-11-14

## 2024-11-19 NOTE — PROGRESS NOTES
"Subjective   Patient ID: Melody Yap is a 64 y.o. female who presents for Follow-up.    Ms. Yap today came here for abdominal pain, cramping, not feeling good.  She was ______ emergency room, found to have acute appendicitis.  Surgery was done.  She is worried that Mounjaro might have caused it.  She is constipated.  She came for follow-up.    I have personally reviewed the patient's Past Medical History, Medications, Allergies, Social History, and Family History in the EMR.    Review of Systems   All other systems reviewed and are negative.    Objective   /82   Ht 1.676 m (5' 6\")   Wt 103 kg (228 lb)   BMI 36.80 kg/m²     Physical Exam  Vitals reviewed.   Cardiovascular:      Heart sounds: Normal heart sounds, S1 normal and S2 normal. No murmur heard.     No friction rub.   Pulmonary:      Effort: Pulmonary effort is normal.      Breath sounds: Normal breath sounds and air entry.   Abdominal:      Palpations: There is no hepatomegaly, splenomegaly or mass.      Tenderness: There is abdominal tenderness in the left lower quadrant.   Musculoskeletal:      Right lower leg: No edema.      Left lower leg: No edema.   Lymphadenopathy:      Lower Body: No right inguinal adenopathy. No left inguinal adenopathy.   Neurological:      Cranial Nerves: Cranial nerves 2-12 are intact.      Sensory: No sensory deficit.      Motor: Motor function is intact.      Deep Tendon Reflexes: Reflexes are normal and symmetric.     LAB WORK: Laboratory testing discussed.    Assessment/Plan   Problem List Items Addressed This Visit             ICD-10-CM       Cardiac and Vasculature    Hypercholesteremia E78.00    Relevant Orders    Comprehensive Metabolic Panel    Lipid Panel    HTN (hypertension) I10    Relevant Orders    CBC       Endocrine/Metabolic    Hypothyroidism E03.9    Diabetes mellitus (Multi) E11.9    Relevant Orders    Hemoglobin A1C    Thyroid Stimulating Hormone    Urinalysis with Reflex Microscopic     Other " Visit Diagnoses         Codes    Status post appendectomy    -  Primary Z90.49    Dermatitis     L30.9    Relevant Medications    potassium chloride CR (Klor-Con M20) 20 mEq ER tablet    Pulmonary embolism, unspecified chronicity, unspecified pulmonary embolism type, unspecified whether acute cor pulmonale present (Multi)     I26.99    Relevant Orders    CT angio chest for pulmonary embolism (Completed)    Yeast infection     B37.9          1. Status post appendix surgery.  Monitor.  2. Question whether Mounjaro caused it.  I think Mounjaro definitely caused her lot of constipation, that might have induced sequence of event to lead it.  We are not going to give Mounjaro for next several months and monitor her.  3. Hypertension, okay.  4. High cholesterol, stable.  5. Hypothyroid.  Monitor.  6. Blood work ordered.  7. I will see her in a month.  8. Reassured her.  9. She told me she has pain in right lower chest, one-week old, there is pleuritic nature to it.  I ordered a CT scan to rule out PE.  ______ ordered.  10. Vaginal yeast.  Diflucan given.    Scribe Attestation  By signing my name below, I, Rivas Malone attest that this documentation has been prepared under the direction and in the presence of Elier Kruger MD.

## 2024-11-22 ENCOUNTER — APPOINTMENT (OUTPATIENT)
Dept: PRIMARY CARE | Facility: CLINIC | Age: 64
End: 2024-11-22
Payer: COMMERCIAL

## 2024-11-25 ENCOUNTER — APPOINTMENT (OUTPATIENT)
Dept: SURGERY | Facility: CLINIC | Age: 64
End: 2024-11-25
Payer: COMMERCIAL

## 2024-11-25 VITALS — BODY MASS INDEX: 36.64 KG/M2 | HEIGHT: 66 IN | WEIGHT: 228 LBS

## 2024-11-25 DIAGNOSIS — K35.30 ACUTE APPENDICITIS WITH LOCALIZED PERITONITIS, WITHOUT PERFORATION, ABSCESS, OR GANGRENE: Primary | ICD-10-CM

## 2024-11-25 PROCEDURE — 3044F HG A1C LEVEL LT 7.0%: CPT | Performed by: SURGERY

## 2024-11-25 PROCEDURE — 3008F BODY MASS INDEX DOCD: CPT | Performed by: SURGERY

## 2024-11-25 PROCEDURE — 1036F TOBACCO NON-USER: CPT | Performed by: SURGERY

## 2024-11-25 PROCEDURE — 3048F LDL-C <100 MG/DL: CPT | Performed by: SURGERY

## 2024-11-25 PROCEDURE — 99024 POSTOP FOLLOW-UP VISIT: CPT | Performed by: SURGERY

## 2024-11-25 ASSESSMENT — ENCOUNTER SYMPTOMS
SHORTNESS OF BREATH: 0
DIAPHORESIS: 0
CHILLS: 0
FEVER: 0
CONFUSION: 0
DIZZINESS: 0
AGITATION: 0
APPETITE CHANGE: 0
ACTIVITY CHANGE: 0

## 2024-11-25 NOTE — PROGRESS NOTES
"Subjective   Patient ID: Alexis Yap \"Melody\" is a 64 y.o. female who presents for No chief complaint on file..  The patient is 2 weeks postop from a laparoscopic appendectomy for acute, focally perforated appendicitis.  She has been healing fairly well since the surgery.  She did have some constipation both leading up to the appendicitis, and a little bit afterwards.  This may have been due to her being on Mounjaro.  She discussed with Dr. Kruger and they are going to hold off on restarting the medication for at least 3 months.  She has otherwise been doing well.  She is eating more normally now, and having regular bowel function.  She denies any fevers or chills.  Her incisions have been healing well.    Review of Systems   Constitutional:  Negative for activity change, appetite change, chills, diaphoresis and fever.   Respiratory:  Negative for shortness of breath.    Cardiovascular:  Negative for chest pain.   Neurological:  Negative for dizziness.   Psychiatric/Behavioral:  Negative for agitation and confusion.    All other systems reviewed and are negative.      Objective   Gen: Alert and Oriented, no distress  Pulm: Breathing Comfortably on room air   CV: RRR, non-tachycardic   Abd: Soft, appropriately tender to palpation.  Incisions well approximated      Assessment/Plan   The patient presents for 2-week follow-up from a laparoscopic appendectomy for acute, focally perforated appendicitis.  She has been healing well since the surgery.  She initially had some issues with constipation.  Dr. Kruger is going to hold on restarting her Mounjaro.  I also reviewed her pathology with her, which was consistent with acute appendicitis.  I advised her to be careful about not doing any heavy lifting, nothing more than 20 pounds for the first month after surgery.  She can follow-up with us as needed.         Miguel Khan MD 11/25/24 9:09 AM   "
Never smoker

## 2024-12-06 ENCOUNTER — APPOINTMENT (OUTPATIENT)
Dept: OBSTETRICS AND GYNECOLOGY | Facility: CLINIC | Age: 64
End: 2024-12-06
Payer: COMMERCIAL

## 2024-12-10 ENCOUNTER — APPOINTMENT (OUTPATIENT)
Dept: OBSTETRICS AND GYNECOLOGY | Facility: CLINIC | Age: 64
End: 2024-12-10
Payer: COMMERCIAL

## 2024-12-13 ENCOUNTER — APPOINTMENT (OUTPATIENT)
Dept: PRIMARY CARE | Facility: CLINIC | Age: 64
End: 2024-12-13
Payer: COMMERCIAL

## 2024-12-13 VITALS
HEIGHT: 66 IN | SYSTOLIC BLOOD PRESSURE: 136 MMHG | WEIGHT: 231 LBS | BODY MASS INDEX: 37.12 KG/M2 | DIASTOLIC BLOOD PRESSURE: 72 MMHG

## 2024-12-13 DIAGNOSIS — B37.9 YEAST INFECTION: ICD-10-CM

## 2024-12-13 DIAGNOSIS — E78.00 HYPERCHOLESTEREMIA: ICD-10-CM

## 2024-12-13 DIAGNOSIS — J02.9 PHARYNGITIS, UNSPECIFIED ETIOLOGY: ICD-10-CM

## 2024-12-13 DIAGNOSIS — J32.9 RHINOSINUSITIS: Primary | ICD-10-CM

## 2024-12-13 DIAGNOSIS — I10 HYPERTENSION, UNSPECIFIED TYPE: ICD-10-CM

## 2024-12-13 DIAGNOSIS — Z87.19 HISTORY OF APPENDICITIS: ICD-10-CM

## 2024-12-13 DIAGNOSIS — Z79.4 TYPE 2 DIABETES MELLITUS WITHOUT COMPLICATION, WITH LONG-TERM CURRENT USE OF INSULIN (MULTI): ICD-10-CM

## 2024-12-13 DIAGNOSIS — R91.1 PULMONARY NODULE: ICD-10-CM

## 2024-12-13 DIAGNOSIS — R05.9 COUGH, UNSPECIFIED TYPE: ICD-10-CM

## 2024-12-13 DIAGNOSIS — E11.9 TYPE 2 DIABETES MELLITUS WITHOUT COMPLICATION, WITH LONG-TERM CURRENT USE OF INSULIN (MULTI): ICD-10-CM

## 2024-12-13 DIAGNOSIS — J40 BRONCHITIS: ICD-10-CM

## 2024-12-13 DIAGNOSIS — L30.9 DERMATITIS: ICD-10-CM

## 2024-12-13 PROCEDURE — 3075F SYST BP GE 130 - 139MM HG: CPT | Performed by: INTERNAL MEDICINE

## 2024-12-13 PROCEDURE — 3008F BODY MASS INDEX DOCD: CPT | Performed by: INTERNAL MEDICINE

## 2024-12-13 PROCEDURE — 3044F HG A1C LEVEL LT 7.0%: CPT | Performed by: INTERNAL MEDICINE

## 2024-12-13 PROCEDURE — 99214 OFFICE O/P EST MOD 30 MIN: CPT | Performed by: INTERNAL MEDICINE

## 2024-12-13 PROCEDURE — 3048F LDL-C <100 MG/DL: CPT | Performed by: INTERNAL MEDICINE

## 2024-12-13 PROCEDURE — 3078F DIAST BP <80 MM HG: CPT | Performed by: INTERNAL MEDICINE

## 2024-12-13 RX ORDER — POTASSIUM CHLORIDE 20 MEQ/1
TABLET, EXTENDED RELEASE ORAL
Qty: 90 TABLET | Refills: 1 | Status: SHIPPED | OUTPATIENT
Start: 2024-12-13

## 2024-12-13 RX ORDER — FLUCONAZOLE 100 MG/1
100 TABLET ORAL DAILY
Qty: 6 TABLET | Refills: 0 | Status: SHIPPED | OUTPATIENT
Start: 2024-12-13 | End: 2024-12-19

## 2024-12-13 RX ORDER — ALBUTEROL SULFATE 90 UG/1
2 INHALANT RESPIRATORY (INHALATION) EVERY 4 HOURS PRN
Qty: 6.7 G | Refills: 0 | Status: SHIPPED | OUTPATIENT
Start: 2024-12-13 | End: 2025-12-13

## 2024-12-13 RX ORDER — BENZONATATE 100 MG/1
100 CAPSULE ORAL 3 TIMES DAILY PRN
Qty: 42 CAPSULE | Refills: 0 | Status: SHIPPED | OUTPATIENT
Start: 2024-12-13 | End: 2025-01-12

## 2024-12-13 RX ORDER — HYDROCHLOROTHIAZIDE 25 MG/1
25 TABLET ORAL DAILY
Qty: 90 TABLET | Refills: 1 | Status: SHIPPED | OUTPATIENT
Start: 2024-12-13

## 2024-12-13 RX ORDER — AMOXICILLIN AND CLAVULANATE POTASSIUM 500; 125 MG/1; MG/1
500 TABLET, FILM COATED ORAL 3 TIMES DAILY
Qty: 30 TABLET | Refills: 0 | Status: SHIPPED | OUTPATIENT
Start: 2024-12-13 | End: 2024-12-23

## 2024-12-13 ASSESSMENT — ENCOUNTER SYMPTOMS
LOSS OF SENSATION IN FEET: 0
OCCASIONAL FEELINGS OF UNSTEADINESS: 0
DEPRESSION: 0

## 2024-12-13 NOTE — PROGRESS NOTES
"Subjective   Patient ID: Melody Yap is a 64 y.o. female who presents for Illness.    1.  This young lady today came here for cough, yellow sputum, sinus congestion, bronchitis, headache, not feeling well.  She ______ not completely better.  2. Follow-up after emergency appendix surgery and CT scan.  She came here for follow-up on various conditions.    I have personally reviewed the patient's Past Medical History, Medications, Allergies, Social History, and Family History in the EMR.    Review of Systems   All other systems reviewed and are negative.    Objective   /72   Ht 1.676 m (5' 6\")   Wt 105 kg (231 lb)   BMI 37.28 kg/m²     Physical Exam  Vitals reviewed.   HENT:      Nose: Congestion present.      Comments: Postnasal drip     Mouth/Throat:      Comments: Throat congested.  Cardiovascular:      Heart sounds: Normal heart sounds, S1 normal and S2 normal. No murmur heard.     No friction rub.   Pulmonary:      Effort: Pulmonary effort is normal.      Breath sounds: Normal breath sounds and air entry.   Abdominal:      Palpations: There is no hepatomegaly, splenomegaly or mass.   Musculoskeletal:      Right lower leg: No edema.      Left lower leg: No edema.   Lymphadenopathy:      Lower Body: No right inguinal adenopathy. No left inguinal adenopathy.   Neurological:      Cranial Nerves: Cranial nerves 2-12 are intact.      Sensory: No sensory deficit.      Motor: Motor function is intact.      Deep Tendon Reflexes: Reflexes are normal and symmetric.     LAB WORK: Laboratory testing discussed.    Assessment/Plan   Problem List Items Addressed This Visit             ICD-10-CM       Cardiac and Vasculature    Hypercholesteremia E78.00    Relevant Orders    Comprehensive Metabolic Panel    Lipid Panel    HTN (hypertension) I10    Relevant Medications    hydroCHLOROthiazide (HYDRODiuril) 25 mg tablet    Other Relevant Orders    CBC    Thyroid Stimulating Hormone       Endocrine/Metabolic    Diabetes " mellitus (Multi) E11.9    Relevant Orders    Hemoglobin A1C       Pulmonary and Pneumonias    Cough R05.9    Relevant Medications    amoxicillin-pot clavulanate (Augmentin) 500-125 mg tablet    albuterol (Proventil HFA) 90 mcg/actuation inhaler    benzonatate (Tessalon) 100 mg capsule     Other Visit Diagnoses         Codes    Rhinosinusitis    -  Primary J32.9    Dermatitis     L30.9    Relevant Medications    potassium chloride CR (Klor-Con M20) 20 mEq ER tablet    Pulmonary nodule     R91.1    Relevant Orders    CT chest wo IV contrast    Yeast infection     B37.9    Pharyngitis, unspecified etiology     J02.9    Bronchitis     J40    History of appendicitis     Z87.19        1. Rhinosinusitis, pharyngitis, and bronchitis.  Azithromycin, Claritin, Robitussin, Flonase, albuterol, and Tessalon.  2. Pulmonary nodule.  I think it is safe to do CT scan in six months.  The patient is nonsmoker.  3. Status post appendicitis.  Emergency surgery, doing good.  4. Hypertension, okay.  5. Cholesterol, stable.  6. Weight loss.  Mounjaro was helping, but high dose caused a problem.  We are going to take a three months break and review the situation again in March.    Sudhiribe Attestation  By signing my name below, I, Rivas Singh attest that this documentation has been prepared under the direction and in the presence of Elier Kruger MD.   All medical record entries made by the scribe were personally dictated by me I have reviewed the chart and agree the record accurately reflects my personal performance of his history physical examination and management

## 2024-12-20 ENCOUNTER — APPOINTMENT (OUTPATIENT)
Dept: OBSTETRICS AND GYNECOLOGY | Facility: CLINIC | Age: 64
End: 2024-12-20
Payer: COMMERCIAL

## 2024-12-20 VITALS
DIASTOLIC BLOOD PRESSURE: 80 MMHG | SYSTOLIC BLOOD PRESSURE: 138 MMHG | BODY MASS INDEX: 36.96 KG/M2 | HEIGHT: 66 IN | WEIGHT: 230 LBS

## 2024-12-20 DIAGNOSIS — Z01.419 WELL WOMAN EXAM: Primary | ICD-10-CM

## 2024-12-20 PROCEDURE — 3048F LDL-C <100 MG/DL: CPT | Performed by: OBSTETRICS & GYNECOLOGY

## 2024-12-20 PROCEDURE — 3075F SYST BP GE 130 - 139MM HG: CPT | Performed by: OBSTETRICS & GYNECOLOGY

## 2024-12-20 PROCEDURE — 3079F DIAST BP 80-89 MM HG: CPT | Performed by: OBSTETRICS & GYNECOLOGY

## 2024-12-20 PROCEDURE — 99396 PREV VISIT EST AGE 40-64: CPT | Performed by: OBSTETRICS & GYNECOLOGY

## 2024-12-20 PROCEDURE — 3008F BODY MASS INDEX DOCD: CPT | Performed by: OBSTETRICS & GYNECOLOGY

## 2024-12-20 PROCEDURE — 3044F HG A1C LEVEL LT 7.0%: CPT | Performed by: OBSTETRICS & GYNECOLOGY

## 2024-12-20 PROCEDURE — 1036F TOBACCO NON-USER: CPT | Performed by: OBSTETRICS & GYNECOLOGY

## 2024-12-20 NOTE — PROGRESS NOTES
"Subjective   Patient ID: Alexis Yap \"Melody\" is a 64 y.o. female who presents for Well woman visit.  Review of my annual note from a year ago,     Neal Tanner MD  Physician  Obstetrics     Progress Notes     Signed     Encounter Date: 11/22/2023    Signed     Expand All Collapse All       Subjective  Patient ID: Alexis Yap is a 63 y.o. female who presents for Well woman visit.  Established patient annual exam.  63 years old.  2 children.     He stopped her Premarin cream and Myrbetriq.  She is doing Kegels and using natural supplements.  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  She has 3 grandchildren.  Non-smoker.  No vaginal bleeding     Last Pap 2022 was negative negative.     On exam breast abdominal pelvic exam is normal.     Well woman exam.  Order for mammogram           Review of Systems   All other systems reviewed and are negative.           Objective  Physical Exam  Constitutional:       Appearance: Normal appearance. She is obese.   Chest:   Breasts:     Right: Normal.      Left: Normal.   Genitourinary:     General: Normal vulva.      Vagina: Normal.      Cervix: Normal.      Uterus: Normal.       Adnexa: Right adnexa normal and left adnexa normal.   Neurological:      Mental Status: She is alert.               Assessment/Plan  Well woman exam.  Order mammogram.  Follow-up 1 year          Prior supracervical hysterectomy with removal of right adnexa.  She was on Mounjaro ended up having appendicitis needing laparoscopic appendectomy.  Has discontinued Mounjaro no new worries or concerns today.    On exam breast abdominal pelvic exams normal.  Well woman exam.  Order for mammogram.  Follow-up 1 year        Review of Systems   All other systems reviewed and are negative.      Objective   Physical Exam  Constitutional:       Appearance: Normal appearance. She is obese.   Chest:   Breasts:     Right: Normal.      Left: Normal.   Genitourinary:     " General: Normal vulva.      Vagina: Normal.      Cervix: Normal.   Neurological:      Mental Status: She is alert.         Assessment/Plan   Well woman exam.  Had a couple of surgeries in the past year including hip and laparoscopic appendectomy.  Has discontinued Mounjaro.  The appendicitis was thought to be a side effect of the Mounjaro.  Mammogram ordered.  Follow-up 1 year         Neal Tanner MD 12/20/24 9:44 AM

## 2024-12-30 ENCOUNTER — APPOINTMENT (OUTPATIENT)
Dept: RADIOLOGY | Facility: HOSPITAL | Age: 64
End: 2024-12-30
Payer: COMMERCIAL

## 2025-01-03 ENCOUNTER — HOSPITAL ENCOUNTER (OUTPATIENT)
Dept: RADIOLOGY | Facility: HOSPITAL | Age: 65
Discharge: HOME | End: 2025-01-03
Payer: COMMERCIAL

## 2025-01-03 DIAGNOSIS — Z12.31 SCREENING MAMMOGRAM FOR BREAST CANCER: ICD-10-CM

## 2025-01-03 PROCEDURE — 77063 BREAST TOMOSYNTHESIS BI: CPT

## 2025-02-24 ENCOUNTER — APPOINTMENT (OUTPATIENT)
Dept: ORTHOPEDIC SURGERY | Facility: HOSPITAL | Age: 65
End: 2025-02-24

## 2025-03-03 ENCOUNTER — APPOINTMENT (OUTPATIENT)
Dept: ORTHOPEDIC SURGERY | Facility: HOSPITAL | Age: 65
End: 2025-03-03
Payer: COMMERCIAL

## 2025-03-10 DIAGNOSIS — F41.9 ANXIETY: ICD-10-CM

## 2025-03-10 DIAGNOSIS — I10 HYPERTENSION, UNSPECIFIED TYPE: ICD-10-CM

## 2025-03-10 RX ORDER — HYDROCHLOROTHIAZIDE 25 MG/1
25 TABLET ORAL DAILY
Qty: 90 TABLET | Refills: 0 | Status: SHIPPED | OUTPATIENT
Start: 2025-03-10

## 2025-03-10 RX ORDER — BUPROPION HYDROCHLORIDE 300 MG/1
300 TABLET ORAL EVERY MORNING
Qty: 90 TABLET | Refills: 0 | Status: SHIPPED | OUTPATIENT
Start: 2025-03-10

## 2025-03-18 LAB
ALBUMIN SERPL-MCNC: 4.1 G/DL (ref 3.6–5.1)
ALP SERPL-CCNC: 70 U/L (ref 37–153)
ALT SERPL-CCNC: 18 U/L (ref 6–29)
ANION GAP SERPL CALCULATED.4IONS-SCNC: 7 MMOL/L (CALC) (ref 7–17)
APPEARANCE UR: ABNORMAL
AST SERPL-CCNC: 20 U/L (ref 10–35)
BACTERIA #/AREA URNS HPF: ABNORMAL /HPF
BILIRUB SERPL-MCNC: 0.5 MG/DL (ref 0.2–1.2)
BILIRUB UR QL STRIP: NEGATIVE
BUN SERPL-MCNC: 21 MG/DL (ref 7–25)
CALCIUM SERPL-MCNC: 9 MG/DL (ref 8.6–10.4)
CHLORIDE SERPL-SCNC: 104 MMOL/L (ref 98–110)
CHOLEST SERPL-MCNC: 171 MG/DL
CHOLEST/HDLC SERPL: 3.8 (CALC)
CO2 SERPL-SCNC: 30 MMOL/L (ref 20–32)
COLOR UR: ABNORMAL
CREAT SERPL-MCNC: 0.69 MG/DL (ref 0.5–1.05)
EGFRCR SERPLBLD CKD-EPI 2021: 98 ML/MIN/1.73M2
ERYTHROCYTE [DISTWIDTH] IN BLOOD BY AUTOMATED COUNT: 12.1 % (ref 11–15)
EST. AVERAGE GLUCOSE BLD GHB EST-MCNC: 140 MG/DL
EST. AVERAGE GLUCOSE BLD GHB EST-SCNC: 7.7 MMOL/L
GLUCOSE SERPL-MCNC: 124 MG/DL (ref 65–99)
GLUCOSE UR QL STRIP: NEGATIVE
HBA1C MFR BLD: 6.5 % OF TOTAL HGB
HCT VFR BLD AUTO: 41.6 % (ref 35–45)
HDLC SERPL-MCNC: 45 MG/DL
HGB BLD-MCNC: 13.9 G/DL (ref 11.7–15.5)
HGB UR QL STRIP: NEGATIVE
HYALINE CASTS #/AREA URNS LPF: ABNORMAL /LPF
KETONES UR QL STRIP: NEGATIVE
LDLC SERPL CALC-MCNC: 98 MG/DL (CALC)
LEUKOCYTE ESTERASE UR QL STRIP: ABNORMAL
MCH RBC QN AUTO: 30.3 PG (ref 27–33)
MCHC RBC AUTO-ENTMCNC: 33.4 G/DL (ref 32–36)
MCV RBC AUTO: 90.8 FL (ref 80–100)
NITRITE UR QL STRIP: NEGATIVE
NONHDLC SERPL-MCNC: 126 MG/DL (CALC)
PH UR STRIP: 5.5 [PH] (ref 5–8)
PLATELET # BLD AUTO: 244 THOUSAND/UL (ref 140–400)
PMV BLD REES-ECKER: 10.8 FL (ref 7.5–12.5)
POTASSIUM SERPL-SCNC: 4.1 MMOL/L (ref 3.5–5.3)
PROT SERPL-MCNC: 6.7 G/DL (ref 6.1–8.1)
PROT UR QL STRIP: ABNORMAL
RBC # BLD AUTO: 4.58 MILLION/UL (ref 3.8–5.1)
RBC #/AREA URNS HPF: ABNORMAL /HPF
SERVICE CMNT-IMP: ABNORMAL
SODIUM SERPL-SCNC: 141 MMOL/L (ref 135–146)
SP GR UR STRIP: 1.03 (ref 1–1.03)
SQUAMOUS #/AREA URNS HPF: ABNORMAL /HPF
TRIGL SERPL-MCNC: 187 MG/DL
TSH SERPL-ACNC: 2.57 MIU/L (ref 0.4–4.5)
WBC # BLD AUTO: 5.8 THOUSAND/UL (ref 3.8–10.8)
WBC #/AREA URNS HPF: ABNORMAL /HPF

## 2025-03-21 ENCOUNTER — APPOINTMENT (OUTPATIENT)
Dept: PRIMARY CARE | Facility: CLINIC | Age: 65
End: 2025-03-21
Payer: COMMERCIAL

## 2025-03-21 VITALS
BODY MASS INDEX: 37.45 KG/M2 | WEIGHT: 233 LBS | DIASTOLIC BLOOD PRESSURE: 80 MMHG | SYSTOLIC BLOOD PRESSURE: 126 MMHG | HEIGHT: 66 IN

## 2025-03-21 DIAGNOSIS — J45.909 ACUTE ASTHMATIC BRONCHITIS (HHS-HCC): ICD-10-CM

## 2025-03-21 DIAGNOSIS — E03.9 HYPOTHYROIDISM, UNSPECIFIED TYPE: ICD-10-CM

## 2025-03-21 DIAGNOSIS — E78.49 OTHER HYPERLIPIDEMIA: ICD-10-CM

## 2025-03-21 DIAGNOSIS — R82.71 ASYMPTOMATIC BACTERIURIA: ICD-10-CM

## 2025-03-21 DIAGNOSIS — L30.9 DERMATITIS: ICD-10-CM

## 2025-03-21 DIAGNOSIS — F41.9 ANXIETY: ICD-10-CM

## 2025-03-21 DIAGNOSIS — E11.9 TYPE 2 DIABETES MELLITUS WITHOUT COMPLICATION, WITH LONG-TERM CURRENT USE OF INSULIN: ICD-10-CM

## 2025-03-21 DIAGNOSIS — E78.00 HIGH CHOLESTEROL: Primary | ICD-10-CM

## 2025-03-21 DIAGNOSIS — I10 PRIMARY HYPERTENSION: ICD-10-CM

## 2025-03-21 DIAGNOSIS — E13.69 OTHER SPECIFIED DIABETES MELLITUS WITH OTHER SPECIFIED COMPLICATION, UNSPECIFIED WHETHER LONG TERM INSULIN USE (MULTI): ICD-10-CM

## 2025-03-21 DIAGNOSIS — Z79.899 MEDICATION MANAGEMENT: ICD-10-CM

## 2025-03-21 DIAGNOSIS — Z79.4 TYPE 2 DIABETES MELLITUS WITHOUT COMPLICATION, WITH LONG-TERM CURRENT USE OF INSULIN: ICD-10-CM

## 2025-03-21 PROCEDURE — 3079F DIAST BP 80-89 MM HG: CPT | Performed by: INTERNAL MEDICINE

## 2025-03-21 PROCEDURE — 3074F SYST BP LT 130 MM HG: CPT | Performed by: INTERNAL MEDICINE

## 2025-03-21 PROCEDURE — 3008F BODY MASS INDEX DOCD: CPT | Performed by: INTERNAL MEDICINE

## 2025-03-21 PROCEDURE — 99214 OFFICE O/P EST MOD 30 MIN: CPT | Performed by: INTERNAL MEDICINE

## 2025-03-21 RX ORDER — LEVOTHYROXINE SODIUM 125 UG/1
125 TABLET ORAL DAILY
Qty: 90 TABLET | Refills: 0 | Status: SHIPPED | OUTPATIENT
Start: 2025-03-21

## 2025-03-21 RX ORDER — METOPROLOL TARTRATE 50 MG/1
TABLET ORAL
Qty: 180 TABLET | Refills: 0 | Status: SHIPPED | OUTPATIENT
Start: 2025-03-21

## 2025-03-21 RX ORDER — POTASSIUM CHLORIDE 20 MEQ/1
TABLET, EXTENDED RELEASE ORAL
Qty: 90 TABLET | Refills: 1 | Status: SHIPPED | OUTPATIENT
Start: 2025-03-21

## 2025-03-21 RX ORDER — ALPRAZOLAM 0.5 MG/1
0.5 TABLET ORAL DAILY PRN
Qty: 20 TABLET | Refills: 0 | Status: SHIPPED | OUTPATIENT
Start: 2025-03-21 | End: 2025-11-16

## 2025-03-21 RX ORDER — ROSUVASTATIN CALCIUM 10 MG/1
10 TABLET, COATED ORAL DAILY
Qty: 90 TABLET | Refills: 0 | Status: SHIPPED | OUTPATIENT
Start: 2025-03-21

## 2025-03-22 NOTE — PROGRESS NOTES
"Subjective   Patient ID: Melody Yap is a 64 y.o. female who presents for Follow-up (Multiple medical issues).    Ms. Alexis Yap today came here for multiple medical issues.  1. She is very unhappy because of Mounjaro gave her abdominal pain ______ she is struck with the bills.  She is not happy with that.  2. Follow-up on blood work and other conditions.  She has no symptoms of UTI.  3. She is taking flight.  She wants some Xanax.  She came here for follow-up on various conditions.    I have personally reviewed the patient's Past Medical History, Medications, Allergies, Social History, and Family History in the EMR.    Review of Systems   All other systems reviewed and are negative.    Objective   /80   Ht 1.676 m (5' 6\")   Wt 106 kg (233 lb)   BMI 37.61 kg/m²     Physical Exam  Vitals reviewed.   Cardiovascular:      Heart sounds: Normal heart sounds, S1 normal and S2 normal. No murmur heard.     No friction rub.   Pulmonary:      Effort: Pulmonary effort is normal.      Breath sounds: Normal breath sounds and air entry.   Abdominal:      Palpations: There is no hepatomegaly, splenomegaly or mass.   Musculoskeletal:      Right lower leg: No edema.      Left lower leg: No edema.   Lymphadenopathy:      Lower Body: No right inguinal adenopathy. No left inguinal adenopathy.   Neurological:      Cranial Nerves: Cranial nerves 2-12 are intact.      Sensory: No sensory deficit.      Motor: Motor function is intact.      Deep Tendon Reflexes: Reflexes are normal and symmetric.     LAB WORK:  Laboratory testing discussed.    Assessment/Plan   Problem List Items Addressed This Visit             ICD-10-CM       Cardiac and Vasculature    HTN (hypertension) I10    Relevant Medications    metoprolol tartrate (Lopressor) 50 mg tablet    Other Relevant Orders    CBC    Urinalysis with Reflex Microscopic       Endocrine/Metabolic    Hypothyroidism E03.9    Relevant Medications    levothyroxine (Synthroid, " Levoxyl) 125 mcg tablet    Other Relevant Orders    Thyroid Stimulating Hormone    Diabetes mellitus (Multi) E11.9    Relevant Orders    Hemoglobin A1C       Mental Health    Anxiety F41.9    Relevant Medications    ALPRAZolam (Xanax) 0.5 mg tablet     Other Visit Diagnoses         Codes    High cholesterol    -  Primary E78.00    Dermatitis     L30.9    Relevant Medications    potassium chloride CR (Klor-Con M20) 20 mEq ER tablet    Other hyperlipidemia     E78.49    Relevant Medications    rosuvastatin (Crestor) 10 mg tablet    Other Relevant Orders    Comprehensive Metabolic Panel    Lipid Panel    Medication management     Z79.899    Relevant Orders    Drug Screen, Urine With Reflex to Confirmation    Asymptomatic bacteriuria     R82.71        1. Status post ______ (appendix surgery), doing okay.  2. Type 2 diabetes.  Blood sugar.  Diet controlled.  She cannot tolerate metformin.  3. Hypertension, okay.  4. High cholesterol, stable.  5. Flight anxiety.  Xanax given.  6. Asymptomatic bacteriuria.  We will keep an eye    Scribe Attestation  By signing my name below, ILoerna Scribe attest that this documentation has been prepared under the direction and in the presence of Elier Kruger MD.     All medical record entries made by the scribe were personally dictated by me I have reviewed the chart and agree the record accurately reflects my personal performance of his history physical examination and management

## 2025-03-23 PROBLEM — E13.69 OTHER SPECIFIED DIABETES MELLITUS WITH OTHER SPECIFIED COMPLICATION, UNSPECIFIED WHETHER LONG TERM INSULIN USE (MULTI): Status: ACTIVE | Noted: 2024-01-26

## 2025-03-23 PROBLEM — J45.909 ACUTE ASTHMATIC BRONCHITIS (HHS-HCC): Status: ACTIVE | Noted: 2025-03-23

## 2025-05-22 ENCOUNTER — APPOINTMENT (OUTPATIENT)
Dept: SURGERY | Facility: CLINIC | Age: 65
End: 2025-05-22
Payer: COMMERCIAL

## 2025-05-22 VITALS — WEIGHT: 233 LBS | BODY MASS INDEX: 37.61 KG/M2

## 2025-05-22 DIAGNOSIS — R10.13 ABDOMINAL PAIN, EPIGASTRIC: ICD-10-CM

## 2025-05-22 DIAGNOSIS — Z01.818 ENCOUNTER FOR PREOPERATIVE EXAMINATION FOR GENERAL SURGICAL PROCEDURE: ICD-10-CM

## 2025-05-22 DIAGNOSIS — K35.30 ACUTE APPENDICITIS WITH LOCALIZED PERITONITIS, WITHOUT PERFORATION, ABSCESS, OR GANGRENE: Primary | ICD-10-CM

## 2025-05-22 PROCEDURE — 1036F TOBACCO NON-USER: CPT | Performed by: SURGERY

## 2025-05-22 PROCEDURE — 99213 OFFICE O/P EST LOW 20 MIN: CPT | Performed by: SURGERY

## 2025-05-22 ASSESSMENT — ENCOUNTER SYMPTOMS
ABDOMINAL PAIN: 1
NAUSEA: 0
FEVER: 0
APPETITE CHANGE: 0
CONSTIPATION: 0
CHILLS: 0
ABDOMINAL DISTENTION: 0

## 2025-05-22 NOTE — PROGRESS NOTES
"Subjective   Patient ID: Alexis Yap \"Melody\" is a 64 y.o. female who presents for Follow-up.    The patient is a 64-year-old woman who is 6 months postop from a laparoscopic appendectomy for acute, focally perforated appendicitis.  She has overall been healing well, but she continues to have right lower quadrant crampy abdominal pains.  These have slowly improved, but they are persisting despite her being 6 months out from the surgery.  She otherwise denies any issues with her bowels, has daily bowel movements.  She is also tolerating a regular diet.  She denies any nausea or vomiting.  She was on Mounjaro prior to the appendectomy, and feels like this gave her a lot of GI issues that she is still dealing with.  Currently she is off of Mounjaro.    Review of Systems   Constitutional:  Negative for appetite change, chills and fever.   Gastrointestinal:  Positive for abdominal pain. Negative for abdominal distention, constipation and nausea.       Objective   Physical Exam  Constitutional:       Appearance: Normal appearance.   Abdominal:      General: Abdomen is flat.      Palpations: Abdomen is soft.      Tenderness: There is abdominal tenderness.      Hernia: No hernia is present.   Neurological:      General: No focal deficit present.      Mental Status: She is alert.   Psychiatric:         Mood and Affect: Mood normal.         Assessment/Plan   The patient is a 64-year-old woman who is 6 months postop from a laparoscopic appendectomy for acute, focally perforated appendicitis.  She continues to have right lower quadrant abdominal pains and cramping.  We will plan to get a follow-up CT scan to evaluate for possible abscess given that she did have a perforation.  We will follow-up with her after the CT scan.         Miguel Khan MD 05/22/25 9:48 AM   "

## 2025-05-31 NOTE — PROGRESS NOTES
[facility]  Hand and Upper Extremity Service  Follow up visit         New Problem: Left ring finger     Interval History: She presents today for left ring finger triggering that have been present for about one year but she has been avoiding treatment of it. She did well following a right ring finger trigger release over a year ago and did have two injections on the right hand before ultimately having surgery.               Past medical history, medications, allergies, surgical history and review of systems are reviewed and otherwise unchanged when compared to last visit on 11/6/23         Examination:  Constitutional: Oriented to person, place, and time.  Appears well-developed and well-nourished.  Head: Normocephalic and atraumatic.  Eyes: Pupils are equal, round, and reactive to light.  Cardiovascular: Intact distal pulses.  Pulmonary/Chest/Breast: Effort normal. No respiratory distress.  Neurological: Alert and oriented to person, place, and time.  Skin: Skin is warm and dry.  Psychiatric: normal mood and affect.  Behavior is normal.  Musculoskeletal: Left hand reveals tenderness to palpation ring finger A1 pulley. Triggering with terminal finger flexion but no locking.        Impression: Left ring finger trigger finger       Plan: She is familiar of treatment options of either injections or surgery. She would like to start with injections today and is not able to consider surgery at this time because of summer plans. She received a left ring finger trigger injection today and will return as needed for recurrence of symptoms.       In Office Procedures Performed: Left ring finger trigger injection   Hand / UE Inj/Asp: L ring A1 for trigger finger on 6/2/2025 12:52 PM  Indications: tendon swelling and pain  Details: 25 G needle, volar approach  Medications: 0.5 mL lidocaine 10 mg/mL (1 %); 20 mg triamcinolone acetonide 40 mg/mL  Outcome: tolerated well, no immediate  complications  Procedure, treatment alternatives, risks and benefits explained, specific risks discussed. Consent was given by the patient. Immediately prior to procedure a time out was called to verify the correct patient, procedure, equipment, support staff and site/side marked as required. Patient was prepped and draped in the usual sterile fashion.             Follow up: As needed             Enio Roldan MD  [facility]  Department of Orthopaedic Surgery  Hand and Upper Extremity Reconstruction    Scribe Attestation  By signing my name below, I, Francisco Alicia , Scribjose antonio   attest that this documentation has been prepared under the direction and in the presence of Dr. Enio Roldan.    Dictation performed with the use of voice recognition software.  Syntax and grammatical errors may exist.

## 2025-06-02 ENCOUNTER — APPOINTMENT (OUTPATIENT)
Dept: ORTHOPEDIC SURGERY | Facility: CLINIC | Age: 65
End: 2025-06-02
Payer: COMMERCIAL

## 2025-06-02 VITALS — BODY MASS INDEX: 37.45 KG/M2 | HEIGHT: 66 IN | WEIGHT: 233 LBS

## 2025-06-02 DIAGNOSIS — M65.342 TRIGGER FINGER, LEFT RING FINGER: Primary | ICD-10-CM

## 2025-06-02 RX ORDER — TRIAMCINOLONE ACETONIDE 40 MG/ML
20 INJECTION, SUSPENSION INTRA-ARTICULAR; INTRAMUSCULAR
Status: COMPLETED | OUTPATIENT
Start: 2025-06-02 | End: 2025-06-02

## 2025-06-02 RX ORDER — LIDOCAINE HYDROCHLORIDE 10 MG/ML
0.5 INJECTION, SOLUTION INFILTRATION; PERINEURAL
Status: COMPLETED | OUTPATIENT
Start: 2025-06-02 | End: 2025-06-02

## 2025-06-02 RX ADMIN — LIDOCAINE HYDROCHLORIDE 0.5 ML: 10 INJECTION, SOLUTION INFILTRATION; PERINEURAL at 12:52

## 2025-06-02 RX ADMIN — TRIAMCINOLONE ACETONIDE 20 MG: 40 INJECTION, SUSPENSION INTRA-ARTICULAR; INTRAMUSCULAR at 12:52

## 2025-06-02 ASSESSMENT — PAIN - FUNCTIONAL ASSESSMENT: PAIN_FUNCTIONAL_ASSESSMENT: 0-10

## 2025-06-02 ASSESSMENT — PAIN SCALES - GENERAL: PAINLEVEL_OUTOF10: 5 - MODERATE PAIN

## 2025-06-02 ASSESSMENT — PAIN DESCRIPTION - DESCRIPTORS: DESCRIPTORS: ACHING;SORE

## 2025-06-05 DIAGNOSIS — E03.9 HYPOTHYROIDISM, UNSPECIFIED TYPE: ICD-10-CM

## 2025-06-05 DIAGNOSIS — F41.9 ANXIETY: ICD-10-CM

## 2025-06-09 LAB
CREAT SERPL-MCNC: 0.72 MG/DL (ref 0.5–1.05)
EGFRCR SERPLBLD CKD-EPI 2021: 94 ML/MIN/1.73M2

## 2025-06-16 ENCOUNTER — HOSPITAL ENCOUNTER (OUTPATIENT)
Dept: RADIOLOGY | Facility: CLINIC | Age: 65
Discharge: HOME | End: 2025-06-16
Payer: COMMERCIAL

## 2025-06-16 DIAGNOSIS — R10.13 ABDOMINAL PAIN, EPIGASTRIC: ICD-10-CM

## 2025-06-16 PROCEDURE — 2550000001 HC RX 255 CONTRASTS: Performed by: SURGERY

## 2025-06-16 PROCEDURE — 74177 CT ABD & PELVIS W/CONTRAST: CPT | Performed by: RADIOLOGY

## 2025-06-16 PROCEDURE — 74177 CT ABD & PELVIS W/CONTRAST: CPT

## 2025-06-16 RX ADMIN — IOHEXOL 75 ML: 350 INJECTION, SOLUTION INTRAVENOUS at 14:27

## 2025-06-21 DIAGNOSIS — E11.9 TYPE 2 DIABETES MELLITUS WITHOUT COMPLICATION, WITH LONG-TERM CURRENT USE OF INSULIN: ICD-10-CM

## 2025-06-21 DIAGNOSIS — I10 PRIMARY HYPERTENSION: ICD-10-CM

## 2025-06-21 DIAGNOSIS — E78.49 OTHER HYPERLIPIDEMIA: ICD-10-CM

## 2025-06-21 DIAGNOSIS — Z79.4 TYPE 2 DIABETES MELLITUS WITHOUT COMPLICATION, WITH LONG-TERM CURRENT USE OF INSULIN: ICD-10-CM

## 2025-06-21 DIAGNOSIS — E03.9 HYPOTHYROIDISM, UNSPECIFIED TYPE: ICD-10-CM

## 2025-06-23 ENCOUNTER — APPOINTMENT (OUTPATIENT)
Dept: SURGERY | Facility: CLINIC | Age: 65
End: 2025-06-23
Payer: COMMERCIAL

## 2025-06-23 DIAGNOSIS — K35.30 ACUTE APPENDICITIS WITH LOCALIZED PERITONITIS, WITHOUT PERFORATION, ABSCESS, OR GANGRENE: ICD-10-CM

## 2025-06-23 DIAGNOSIS — R10.13 ABDOMINAL PAIN, EPIGASTRIC: Primary | ICD-10-CM

## 2025-06-23 PROCEDURE — 99213 OFFICE O/P EST LOW 20 MIN: CPT | Performed by: SURGERY

## 2025-06-23 ASSESSMENT — ENCOUNTER SYMPTOMS
NAUSEA: 0
CONSTIPATION: 0
ABDOMINAL PAIN: 1
CHILLS: 0
FEVER: 0
ABDOMINAL DISTENTION: 0
APPETITE CHANGE: 0

## 2025-06-23 NOTE — PROGRESS NOTES
"Subjective   Patient ID: Alexis Yap \"Ted" is a 65 y.o. female who presents for No chief complaint on file..    The patient is a 64-year-old woman who is 6 months postop from a laparoscopic appendectomy for acute, focally perforated appendicitis.  She has overall been healing well, but she continues to have right lower quadrant crampy abdominal pains.  These have slowly improved, but they are persisting despite her being 6 months out from the surgery.  She otherwise denies any issues with her bowels, has daily bowel movements.  She is also tolerating a regular diet.  She denies any nausea or vomiting.  We got a repeat CT scan, which shows some thickening along the terminal ileum along with a small calcification within the lumen.  She is due for a follow-up colonoscopy, as her last surveillance one was 5 years ago.    Review of Systems   Constitutional:  Negative for appetite change, chills and fever.   Gastrointestinal:  Positive for abdominal pain. Negative for abdominal distention, constipation and nausea.       Objective   Physical Exam  Constitutional:       Appearance: Normal appearance.   Abdominal:      General: Abdomen is flat.      Palpations: Abdomen is soft.      Tenderness: There is abdominal tenderness.      Hernia: No hernia is present.   Neurological:      General: No focal deficit present.      Mental Status: She is alert.   Psychiatric:         Mood and Affect: Mood normal.         Assessment/Plan   The patient is a 64-year-old woman who is 6 months postop from a laparoscopic appendectomy for acute, focally perforated appendicitis.  She continues to have right lower quadrant abdominal pains and cramping.  CT scan does not show any abscess.  She does have some thickening of the terminal ileum along with a small calcification within the lumen.  She is due for a repeat colonoscopy this year, and will reach out to her GI to schedule a repeat and to evaluate the terminal ilium          Miguel RIVERA " Reason for Call:  Medication or medication refill:    Do you use a  [facility]?  Name of the pharmacy and phone number for the current request:  [facility]    Name of the medication requested:   escitalopram (LEXAPRO) 10 MG tablet 90 tablet 3 1/17/2022  No   Sig - Route: Take 1 tablet (10 mg) by mouth daily - Oral         Other request: pt is calling and his water bottle opened and it also got all his pills wet(he thinks he had like 75 left or so) and dissolved in his backpack.  Can this be refilled early?  Please let pt know.    Can we leave a detailed message on this number? YES    Phone number patient can be reached at: Cell number on file:    Telephone Information:   Mobile [phone]       Best Time: any    Call taken on 6/2/2022 at 8:33 AM by Pam J. Behr     MD Bill 06/23/25 12:55 PM

## 2025-08-11 DIAGNOSIS — I10 HYPERTENSION, UNSPECIFIED TYPE: ICD-10-CM

## 2025-08-11 DIAGNOSIS — F41.9 ANXIETY: ICD-10-CM

## 2025-08-11 DIAGNOSIS — E03.9 HYPOTHYROIDISM, UNSPECIFIED TYPE: ICD-10-CM

## 2025-08-11 DIAGNOSIS — E78.49 OTHER HYPERLIPIDEMIA: ICD-10-CM

## 2025-08-12 RX ORDER — HYDROCHLOROTHIAZIDE 25 MG/1
25 TABLET ORAL DAILY
Qty: 90 TABLET | Refills: 1 | Status: SHIPPED | OUTPATIENT
Start: 2025-08-12

## 2025-08-12 RX ORDER — ROSUVASTATIN CALCIUM 10 MG/1
10 TABLET, COATED ORAL DAILY
Qty: 90 TABLET | Refills: 1 | Status: SHIPPED | OUTPATIENT
Start: 2025-08-12

## 2025-08-12 RX ORDER — LEVOTHYROXINE SODIUM 125 UG/1
TABLET ORAL
Qty: 90 TABLET | Refills: 1 | Status: SHIPPED | OUTPATIENT
Start: 2025-08-12

## 2025-08-12 RX ORDER — BUPROPION HYDROCHLORIDE 300 MG/1
300 TABLET ORAL
Qty: 90 TABLET | Refills: 1 | Status: SHIPPED | OUTPATIENT
Start: 2025-08-12

## 2025-08-22 ENCOUNTER — APPOINTMENT (OUTPATIENT)
Facility: CLINIC | Age: 65
End: 2025-08-22
Payer: COMMERCIAL

## 2025-08-22 VITALS
HEIGHT: 66 IN | HEART RATE: 66 BPM | SYSTOLIC BLOOD PRESSURE: 126 MMHG | DIASTOLIC BLOOD PRESSURE: 84 MMHG | OXYGEN SATURATION: 99 % | WEIGHT: 237 LBS | BODY MASS INDEX: 38.09 KG/M2

## 2025-08-22 DIAGNOSIS — Z11.59 ENCOUNTER FOR HEPATITIS C SCREENING TEST FOR LOW RISK PATIENT: ICD-10-CM

## 2025-08-22 DIAGNOSIS — Z79.4 TYPE 2 DIABETES MELLITUS WITHOUT COMPLICATION, WITH LONG-TERM CURRENT USE OF INSULIN: ICD-10-CM

## 2025-08-22 DIAGNOSIS — N39.46 MIXED STRESS AND URGE URINARY INCONTINENCE: ICD-10-CM

## 2025-08-22 DIAGNOSIS — Z12.11 COLON CANCER SCREENING: ICD-10-CM

## 2025-08-22 DIAGNOSIS — R60.0 LOCALIZED EDEMA: ICD-10-CM

## 2025-08-22 DIAGNOSIS — F41.9 ANXIETY: ICD-10-CM

## 2025-08-22 DIAGNOSIS — Z23 NEED FOR SHINGLES VACCINE: ICD-10-CM

## 2025-08-22 DIAGNOSIS — E03.9 HYPOTHYROIDISM, UNSPECIFIED TYPE: ICD-10-CM

## 2025-08-22 DIAGNOSIS — R39.9 LOWER URINARY TRACT SYMPTOMS (LUTS): ICD-10-CM

## 2025-08-22 DIAGNOSIS — I10 PRIMARY HYPERTENSION: ICD-10-CM

## 2025-08-22 DIAGNOSIS — I10 HYPERTENSION, UNSPECIFIED TYPE: ICD-10-CM

## 2025-08-22 DIAGNOSIS — Z00.00 ENCOUNTER FOR PREVENTIVE HEALTH EXAMINATION: Primary | ICD-10-CM

## 2025-08-22 DIAGNOSIS — F41.0 PANIC ATTACKS: ICD-10-CM

## 2025-08-22 DIAGNOSIS — R35.0 URINARY FREQUENCY: ICD-10-CM

## 2025-08-22 DIAGNOSIS — Z96.643 HISTORY OF ARTHROPLASTY OF BOTH HIPS: ICD-10-CM

## 2025-08-22 DIAGNOSIS — E55.9 HYPOVITAMINOSIS D: ICD-10-CM

## 2025-08-22 DIAGNOSIS — E11.9 TYPE 2 DIABETES MELLITUS WITHOUT COMPLICATION, WITH LONG-TERM CURRENT USE OF INSULIN: ICD-10-CM

## 2025-08-22 DIAGNOSIS — E78.00 HYPERCHOLESTEREMIA: ICD-10-CM

## 2025-08-22 PROBLEM — Z96.649 HISTORY OF TOTAL HIP REPLACEMENT: Status: RESOLVED | Noted: 2023-10-11 | Resolved: 2025-08-22

## 2025-08-22 PROBLEM — R73.03 PRE-DIABETES: Status: RESOLVED | Noted: 2023-10-11 | Resolved: 2025-08-22

## 2025-08-22 PROBLEM — M16.0 PRIMARY OSTEOARTHRITIS OF BOTH HIPS: Status: RESOLVED | Noted: 2023-10-11 | Resolved: 2025-08-22

## 2025-08-22 PROBLEM — M22.2X1 PATELLOFEMORAL SYNDROME, RIGHT: Status: RESOLVED | Noted: 2023-10-11 | Resolved: 2025-08-22

## 2025-08-22 PROBLEM — R03.0 BORDERLINE HYPERTENSION: Status: RESOLVED | Noted: 2023-10-11 | Resolved: 2025-08-22

## 2025-08-22 PROBLEM — M16.10 HIP ARTHRITIS: Status: RESOLVED | Noted: 2023-10-11 | Resolved: 2025-08-22

## 2025-08-22 PROBLEM — J45.909 ACUTE ASTHMATIC BRONCHITIS (HHS-HCC): Status: RESOLVED | Noted: 2025-03-23 | Resolved: 2025-08-22

## 2025-08-22 PROBLEM — L30.9 DERMATITIS: Status: ACTIVE | Noted: 2025-08-22

## 2025-08-22 PROBLEM — M16.11 PRIMARY OSTEOARTHRITIS OF RIGHT HIP: Status: RESOLVED | Noted: 2024-02-27 | Resolved: 2025-08-22

## 2025-08-22 PROBLEM — N89.8 PRURITUS OF VAGINA: Status: RESOLVED | Noted: 2023-10-11 | Resolved: 2025-08-22

## 2025-08-22 PROBLEM — M25.559 HIP PAIN: Status: RESOLVED | Noted: 2023-10-11 | Resolved: 2025-08-22

## 2025-08-22 PROBLEM — J01.90 ACUTE SINUSITIS: Status: RESOLVED | Noted: 2024-01-26 | Resolved: 2025-08-22

## 2025-08-22 PROBLEM — R07.9 CHEST PAIN: Status: RESOLVED | Noted: 2024-07-05 | Resolved: 2025-08-22

## 2025-08-22 PROBLEM — B37.9 CANDIDIASIS: Status: RESOLVED | Noted: 2024-01-26 | Resolved: 2025-08-22

## 2025-08-22 PROBLEM — N39.0 URINARY TRACT INFECTION: Status: RESOLVED | Noted: 2024-01-26 | Resolved: 2025-08-22

## 2025-08-22 PROBLEM — M79.643 PAIN OF HAND: Status: RESOLVED | Noted: 2024-01-26 | Resolved: 2025-08-22

## 2025-08-22 RX ORDER — HYDROCHLOROTHIAZIDE 25 MG/1
25 TABLET ORAL DAILY
Qty: 90 TABLET | Refills: 1 | Status: SHIPPED | OUTPATIENT
Start: 2025-08-22 | End: 2025-08-22

## 2025-08-22 RX ORDER — HYDROXYZINE HYDROCHLORIDE 25 MG/1
25 TABLET, FILM COATED ORAL 2 TIMES DAILY
Qty: 60 TABLET | Refills: 0 | Status: SHIPPED | OUTPATIENT
Start: 2025-08-22 | End: 2025-09-21

## 2025-08-22 RX ORDER — HYDROCHLOROTHIAZIDE 25 MG/1
25 TABLET ORAL DAILY
Qty: 90 TABLET | Refills: 1 | Status: SHIPPED | OUTPATIENT
Start: 2025-08-22

## 2025-08-22 RX ORDER — POTASSIUM CHLORIDE 20 MEQ/1
20 TABLET, EXTENDED RELEASE ORAL DAILY
Qty: 90 TABLET | Refills: 1 | Status: SHIPPED | OUTPATIENT
Start: 2025-08-22 | End: 2026-02-18

## 2025-08-22 RX ORDER — BUPROPION HYDROCHLORIDE 300 MG/1
300 TABLET ORAL
Qty: 90 TABLET | Refills: 1 | Status: SHIPPED | OUTPATIENT
Start: 2025-08-22

## 2025-08-22 RX ORDER — METOPROLOL SUCCINATE 50 MG/1
50 TABLET, EXTENDED RELEASE ORAL DAILY
Qty: 90 TABLET | Refills: 1 | Status: SHIPPED | OUTPATIENT
Start: 2025-08-22 | End: 2025-08-22

## 2025-08-22 RX ORDER — LEVOTHYROXINE SODIUM 125 UG/1
125 TABLET ORAL DAILY
Qty: 90 TABLET | Refills: 1 | Status: SHIPPED | OUTPATIENT
Start: 2025-08-22 | End: 2026-02-18

## 2025-08-22 RX ORDER — BUPROPION HYDROCHLORIDE 300 MG/1
300 TABLET ORAL
Qty: 90 TABLET | Refills: 1 | Status: SHIPPED | OUTPATIENT
Start: 2025-08-22 | End: 2025-08-22

## 2025-08-22 RX ORDER — LEVOTHYROXINE SODIUM 125 UG/1
125 TABLET ORAL DAILY
Qty: 90 TABLET | Refills: 1 | Status: SHIPPED | OUTPATIENT
Start: 2025-08-22 | End: 2025-08-22

## 2025-08-22 RX ORDER — METOPROLOL SUCCINATE 50 MG/1
50 TABLET, EXTENDED RELEASE ORAL DAILY
Qty: 90 TABLET | Refills: 1 | Status: SHIPPED | OUTPATIENT
Start: 2025-08-22 | End: 2026-02-18

## 2025-08-22 RX ORDER — ROSUVASTATIN CALCIUM 10 MG/1
10 TABLET, COATED ORAL DAILY
Qty: 90 TABLET | Refills: 1 | Status: SHIPPED | OUTPATIENT
Start: 2025-08-22 | End: 2025-08-22

## 2025-08-22 RX ORDER — POTASSIUM CHLORIDE 20 MEQ/1
20 TABLET, EXTENDED RELEASE ORAL DAILY
Qty: 90 TABLET | Refills: 1 | Status: SHIPPED | OUTPATIENT
Start: 2025-08-22 | End: 2025-08-22

## 2025-08-22 RX ORDER — ROSUVASTATIN CALCIUM 10 MG/1
10 TABLET, COATED ORAL DAILY
Qty: 90 TABLET | Refills: 1 | Status: SHIPPED | OUTPATIENT
Start: 2025-08-22

## 2025-08-27 ENCOUNTER — TELEPHONE (OUTPATIENT)
Facility: CLINIC | Age: 65
End: 2025-08-27
Payer: COMMERCIAL

## 2025-11-24 ENCOUNTER — APPOINTMENT (OUTPATIENT)
Facility: CLINIC | Age: 65
End: 2025-11-24
Payer: COMMERCIAL

## 2026-01-09 ENCOUNTER — APPOINTMENT (OUTPATIENT)
Dept: OBSTETRICS AND GYNECOLOGY | Facility: CLINIC | Age: 66
End: 2026-01-09
Payer: COMMERCIAL

## (undated) DEVICE — TRAY, DRY PREP, PREMIUM

## (undated) DEVICE — TUBING, IRRIGATION, HIGH FLOW, HAND PIECE SET

## (undated) DEVICE — SUTURE, VICRYL, 3-0, 27 IN, SH

## (undated) DEVICE — ADHESIVE, SKIN, DERMABOND ADVANCED, 15CM, PEN-STYLE

## (undated) DEVICE — SUTURE, STRATAFIX, 3-0, SPIRAL MONOCRYL PLUS, UNDYED 20CM  SH

## (undated) DEVICE — WOUND SYSTEM, DEBRIDEMENT & CLEANING, O.R DUOPAK

## (undated) DEVICE — BLADE, RECIPROCATING, LONG, HEAVY DUTY, FIXED POINT .14

## (undated) DEVICE — DRAPE, SHEET, U, STERI DRAPE, 47 X 51 IN, DISPOSABLE, STERILE

## (undated) DEVICE — CARE KIT, LAPAROSCOPIC, ADVANCED

## (undated) DEVICE — TROCAR SYSTEM, BALLOON, KII GELPORT, 12 X 100MM

## (undated) DEVICE — RETRIEVAL SYSTEM, MONARCH, 10MM DISP ENDOSCOPIC

## (undated) DEVICE — Device

## (undated) DEVICE — SUTURE, VICRYL, 0, 18 IN, CT-1, UNDYED

## (undated) DEVICE — SUTURE, MONOCRYL, 4-0, 27 IN, PS-2, UNDYED

## (undated) DEVICE — SOLUTION, IRRIGATION, X RX SODIUM CHL 0.9%, 1000ML BTL

## (undated) DEVICE — LIGASURE, XP MARYLAND, 34CM, SEALER/DIVIDER LATCH HANDLE

## (undated) DEVICE — TUBE SET, PNEUMOCLEAR, SMOKE EVACU, HIGH-FLOW

## (undated) DEVICE — GLOVE, SURGICAL, PROTEXIS NEOPRENE, 7.0, PF, LF

## (undated) DEVICE — GLOVE, SURGICAL, PROTEXIS PI , 7.0, PF, LF

## (undated) DEVICE — DRAPE, INSTRUMENT, W/POUCH, STERI DRAPE, 7 X 11 IN, DISPOSABLE, STERILE

## (undated) DEVICE — SPONGE, HEMOSTATIC, CELLULOSE, SURGICEL, 4 X 8 IN

## (undated) DEVICE — DRAPE, SHEET, U, W/ADHESIVE STRIP, IMPERVIOUS, 60 X 70 IN, DISPOSABLE, LF, STERILE

## (undated) DEVICE — IRRIGATOR, WOUND, HYDRO SURG PLUS, W/O TIP, DISP

## (undated) DEVICE — CLIP, ENDO APPLIER LIGAMAX 5MM

## (undated) DEVICE — STAPLER, EF POWERED PLUS, CUTTER 340MM, 45MM EFFECTOR, DISP

## (undated) DEVICE — STRIP, SKIN CLOSURE, STERI STRIP, REINFORCED, 0.5 X 4 IN

## (undated) DEVICE — SUTURE, ETHIBOND, P2, V-37, 30 IN, GREEN

## (undated) DEVICE — GOWN, SURGICAL, SIRUS, NON REINFORCED, LARGE

## (undated) DEVICE — SYRINGE, 50 CC, LUER LOCK

## (undated) DEVICE — 125IN X 83IN STERI-DRAPE ISOLATION DRAPE WITH IOBAN 2 INCISE FILM AND POUCH 6617

## (undated) DEVICE — SYRINGE, 10 CC, LUER LOCK

## (undated) DEVICE — ADHESIVE, SKIN, MASTISOL, 2/3 CC VIAL

## (undated) DEVICE — STAPLER, SKIN, PLUS, WIDE, 35

## (undated) DEVICE — SOLUTION, CHLORHEXIDINE, 4%, 4OZ

## (undated) DEVICE — APPLICATOR, CHLORAPREP, W/ORANGE TINT, 26ML

## (undated) DEVICE — SLEEVE, KII, Z-THREAD, 5X100CM

## (undated) DEVICE — DRAPE, ISOLATION, INCISE, W/POUCH, STERI DRAPE, 125 X 83 IN, DISPOSABLE, STERILE

## (undated) DEVICE — COVER, PLASTIC, MAYO STAND, 29.5IN X 55.5IN

## (undated) DEVICE — NEEDLE, HYPODERMIC, MONOJECT, 25 G X 1.5 IN, LUER LOCK HUB, RED

## (undated) DEVICE — STAPLER, ENDO ECHELON 45MM RELOAD, WHITE, REUSABLE

## (undated) DEVICE — TROCAR, KII OPTICAL BLADELESS 5MM Z THREAD 100MM LNGTH

## (undated) DEVICE — DRAPE, TIBURON W/ADHESIVE, 19 X 30